# Patient Record
Sex: FEMALE | Race: BLACK OR AFRICAN AMERICAN | Employment: OTHER | URBAN - METROPOLITAN AREA
[De-identification: names, ages, dates, MRNs, and addresses within clinical notes are randomized per-mention and may not be internally consistent; named-entity substitution may affect disease eponyms.]

---

## 2020-09-08 ENCOUNTER — OFFICE VISIT (OUTPATIENT)
Dept: FAMILY MEDICINE CLINIC | Facility: CLINIC | Age: 65
End: 2020-09-08
Payer: MEDICARE

## 2020-09-08 VITALS
HEIGHT: 63.5 IN | RESPIRATION RATE: 17 BRPM | DIASTOLIC BLOOD PRESSURE: 78 MMHG | BODY MASS INDEX: 28.53 KG/M2 | WEIGHT: 163 LBS | HEART RATE: 69 BPM | TEMPERATURE: 97 F | SYSTOLIC BLOOD PRESSURE: 150 MMHG

## 2020-09-08 DIAGNOSIS — Z13.220 LIPID SCREENING: ICD-10-CM

## 2020-09-08 DIAGNOSIS — R92.1 BREAST CALCIFICATION, LEFT: Primary | ICD-10-CM

## 2020-09-08 DIAGNOSIS — G89.29 CHRONIC RIGHT-SIDED LOW BACK PAIN WITH RIGHT-SIDED SCIATICA: ICD-10-CM

## 2020-09-08 DIAGNOSIS — M54.41 CHRONIC RIGHT-SIDED LOW BACK PAIN WITH RIGHT-SIDED SCIATICA: ICD-10-CM

## 2020-09-08 DIAGNOSIS — Z12.11 COLON CANCER SCREENING: ICD-10-CM

## 2020-09-08 DIAGNOSIS — M20.41 HAMMER TOES OF BOTH FEET: ICD-10-CM

## 2020-09-08 DIAGNOSIS — M20.42 HAMMER TOES OF BOTH FEET: ICD-10-CM

## 2020-09-08 DIAGNOSIS — Z13.29 THYROID DISORDER SCREEN: ICD-10-CM

## 2020-09-08 DIAGNOSIS — E55.9 VITAMIN D INSUFFICIENCY: ICD-10-CM

## 2020-09-08 DIAGNOSIS — Z76.89 ESTABLISHING CARE WITH NEW DOCTOR, ENCOUNTER FOR: ICD-10-CM

## 2020-09-08 DIAGNOSIS — I10 ESSENTIAL HYPERTENSION: ICD-10-CM

## 2020-09-08 LAB
ALBUMIN SERPL-MCNC: 3.8 G/DL (ref 3.4–5)
ALBUMIN/GLOB SERPL: 1 {RATIO} (ref 1–2)
ALP LIVER SERPL-CCNC: 93 U/L (ref 55–142)
ALT SERPL-CCNC: 27 U/L (ref 13–56)
ANION GAP SERPL CALC-SCNC: 4 MMOL/L (ref 0–18)
AST SERPL-CCNC: 26 U/L (ref 15–37)
BACTERIA UR QL AUTO: NEGATIVE /HPF
BASOPHILS # BLD AUTO: 0.03 X10(3) UL (ref 0–0.2)
BASOPHILS NFR BLD AUTO: 0.5 %
BILIRUB SERPL-MCNC: 0.2 MG/DL (ref 0.1–2)
BILIRUB UR QL: NEGATIVE
BUN BLD-MCNC: 7 MG/DL (ref 7–18)
BUN/CREAT SERPL: 7.6 (ref 10–20)
CALCIUM BLD-MCNC: 9 MG/DL (ref 8.5–10.1)
CHLORIDE SERPL-SCNC: 105 MMOL/L (ref 98–112)
CHOLEST SMN-MCNC: 204 MG/DL (ref ?–200)
CLARITY UR: CLEAR
CO2 SERPL-SCNC: 31 MMOL/L (ref 21–32)
COLOR UR: YELLOW
CREAT BLD-MCNC: 0.92 MG/DL (ref 0.55–1.02)
DEPRECATED RDW RBC AUTO: 49.9 FL (ref 35.1–46.3)
EOSINOPHIL # BLD AUTO: 0.04 X10(3) UL (ref 0–0.7)
EOSINOPHIL NFR BLD AUTO: 0.7 %
ERYTHROCYTE [DISTWIDTH] IN BLOOD BY AUTOMATED COUNT: 15.1 % (ref 11–15)
GLOBULIN PLAS-MCNC: 3.9 G/DL (ref 2.8–4.4)
GLUCOSE BLD-MCNC: 70 MG/DL (ref 70–99)
GLUCOSE UR-MCNC: NEGATIVE MG/DL
HCT VFR BLD AUTO: 41.3 % (ref 35–48)
HDLC SERPL-MCNC: 75 MG/DL (ref 40–59)
HGB BLD-MCNC: 13.1 G/DL (ref 12–16)
HGB UR QL STRIP.AUTO: NEGATIVE
IMM GRANULOCYTES # BLD AUTO: 0.01 X10(3) UL (ref 0–1)
IMM GRANULOCYTES NFR BLD: 0.2 %
KETONES UR-MCNC: NEGATIVE MG/DL
LDLC SERPL CALC-MCNC: 109 MG/DL (ref ?–100)
LEUKOCYTE ESTERASE UR QL STRIP.AUTO: NEGATIVE
LYMPHOCYTES # BLD AUTO: 1.57 X10(3) UL (ref 1–4)
LYMPHOCYTES NFR BLD AUTO: 25.9 %
M PROTEIN MFR SERPL ELPH: 7.7 G/DL (ref 6.4–8.2)
MCH RBC QN AUTO: 28.6 PG (ref 26–34)
MCHC RBC AUTO-ENTMCNC: 31.7 G/DL (ref 31–37)
MCV RBC AUTO: 90.2 FL (ref 80–100)
MONOCYTES # BLD AUTO: 0.48 X10(3) UL (ref 0.1–1)
MONOCYTES NFR BLD AUTO: 7.9 %
NEUTROPHILS # BLD AUTO: 3.93 X10 (3) UL (ref 1.5–7.7)
NEUTROPHILS # BLD AUTO: 3.93 X10(3) UL (ref 1.5–7.7)
NEUTROPHILS NFR BLD AUTO: 64.8 %
NITRITE UR QL STRIP.AUTO: NEGATIVE
NONHDLC SERPL-MCNC: 129 MG/DL (ref ?–130)
OSMOLALITY SERPL CALC.SUM OF ELEC: 286 MOSM/KG (ref 275–295)
PATIENT FASTING Y/N/NP: NO
PATIENT FASTING Y/N/NP: NO
PH UR: 7 [PH] (ref 5–8)
PLATELET # BLD AUTO: 156 10(3)UL (ref 150–450)
POTASSIUM SERPL-SCNC: 3.6 MMOL/L (ref 3.5–5.1)
PROT UR-MCNC: NEGATIVE MG/DL
RBC # BLD AUTO: 4.58 X10(6)UL (ref 3.8–5.3)
RBC #/AREA URNS AUTO: <1 /HPF
SODIUM SERPL-SCNC: 140 MMOL/L (ref 136–145)
SP GR UR STRIP: 1.01 (ref 1–1.03)
TRIGL SERPL-MCNC: 100 MG/DL (ref 30–149)
TSI SER-ACNC: 3.2 MIU/ML (ref 0.36–3.74)
UROBILINOGEN UR STRIP-ACNC: <2
VLDLC SERPL CALC-MCNC: 20 MG/DL (ref 0–30)
WBC # BLD AUTO: 6.1 X10(3) UL (ref 4–11)
WBC #/AREA URNS AUTO: <1 /HPF

## 2020-09-08 PROCEDURE — 99204 OFFICE O/P NEW MOD 45 MIN: CPT | Performed by: FAMILY MEDICINE

## 2020-09-08 NOTE — PATIENT INSTRUCTIONS
Patient referred to GI for updating her colonoscopy. Patient also referred to mammography for diagnostic evaluation with a history of left breast calcifications. Patient referred to podiatry for hammertoes bilaterally.   Monitor blood pressures and record

## 2020-09-08 NOTE — PROGRESS NOTES
HPI:    Patient ID: López Luna is a 77year old female.     Patient is a 38-year-old -American female here for establishing care physical and for status update on any confirmed chronic medical illnesses and follow up on any previous labs or proced OR) Take 1 tablet by mouth daily. • Coenzyme Q10 (CO Q-10 OR) Take 1 tablet by mouth daily. • Lactobacillus (PROBIATA OR) Take by mouth.        Allergies:  Lisinopril              ANAPHYLAXIS  Penicillins             ANAPHYLAXIS     09/08/20  2234 insufficiency  Status update.  - VITAMIN D, 25-HYDROXY; Future  - VITAMIN D, 25-HYDROXY    8. Lipid screening  Screened. - LIPID PANEL; Future  - LIPID PANEL    9. Thyroid disorder screen  Screened.   - TSH W REFLEX TO FREE T4; Future  - TSH W REFLEX TO FR

## 2020-09-09 LAB — 25(OH)D3 SERPL-MCNC: 34.6 NG/ML (ref 30–100)

## 2020-09-10 ENCOUNTER — OFFICE VISIT (OUTPATIENT)
Dept: GASTROENTEROLOGY | Facility: CLINIC | Age: 65
End: 2020-09-10
Payer: MEDICARE

## 2020-09-10 ENCOUNTER — TELEPHONE (OUTPATIENT)
Dept: GASTROENTEROLOGY | Facility: CLINIC | Age: 65
End: 2020-09-10

## 2020-09-10 VITALS
HEIGHT: 63.5 IN | HEART RATE: 67 BPM | WEIGHT: 163 LBS | BODY MASS INDEX: 28.53 KG/M2 | SYSTOLIC BLOOD PRESSURE: 125 MMHG | DIASTOLIC BLOOD PRESSURE: 75 MMHG

## 2020-09-10 DIAGNOSIS — Z12.11 SCREEN FOR COLON CANCER: Primary | ICD-10-CM

## 2020-09-10 DIAGNOSIS — Z01.818 ENCOUNTER FOR PREOPERATIVE GASTROINTESTINAL EXAMINATION: Primary | ICD-10-CM

## 2020-09-10 DIAGNOSIS — T88.52XD: ICD-10-CM

## 2020-09-10 PROCEDURE — 99203 OFFICE O/P NEW LOW 30 MIN: CPT | Performed by: INTERNAL MEDICINE

## 2020-09-10 RX ORDER — POLYETHYLENE GLYCOL 3350, SODIUM CHLORIDE, SODIUM BICARBONATE, POTASSIUM CHLORIDE 420; 11.2; 5.72; 1.48 G/4L; G/4L; G/4L; G/4L
POWDER, FOR SOLUTION ORAL
Qty: 1 BOTTLE | Refills: 0 | Status: ON HOLD | OUTPATIENT
Start: 2020-09-10 | End: 2020-11-11

## 2020-09-10 NOTE — H&P
[address] [facility] [address] Gastroenterology                                                                                                  Clinic History and Physical     Pa guaiFENesin (HERBAL EXPEC OR) Take by mouth. • Misc Natural Products (TUMERSAID OR) Take 1 tablet by mouth daily. • Coenzyme Q10 (CO Q-10 OR) Take 1 tablet by mouth daily. • Lactobacillus (PROBIATA OR) Take by mouth.          Allergies:    Lisin will use MAC    # Average Risk screening: patient is considered average risk for colon cancer (NO family hx of colon cancer) and it is appropriate to proceed with screening colonoscopy.  Patient is currently asymptomatic and denies diarrhea, hematochezia, t

## 2020-09-10 NOTE — PATIENT INSTRUCTIONS
1. Schedule colonoscopy with MAC [Diagnosis: screening]    2.  bowel prep from pharmacy ([employer])    3. Continue all medications for procedure    4. Read all bowel prep instructions carefully    5.  AVOID seeds, nuts, popcorn, raw fruits and ve

## 2020-09-10 NOTE — TELEPHONE ENCOUNTER
Scheduled for:  Colonoscopy 90846  Provider Name:  Evan Recio  Date:  11/11/20  Location:  [facility]  Sedation:  MAC  Time:  9am pt told to arrive at 8am  Prep:  Split dose trilyte  Meds/Allergies Reconciled?:  Physician reviewed    Diagnosis with codes:  Screen Z

## 2020-09-11 ENCOUNTER — HOSPITAL ENCOUNTER (OUTPATIENT)
Dept: GENERAL RADIOLOGY | Facility: HOSPITAL | Age: 65
Discharge: HOME | End: 2020-09-11
Attending: FAMILY MEDICINE
Payer: MEDICARE

## 2020-09-11 DIAGNOSIS — G89.29 CHRONIC RIGHT-SIDED LOW BACK PAIN WITH RIGHT-SIDED SCIATICA: ICD-10-CM

## 2020-09-11 DIAGNOSIS — M54.41 CHRONIC RIGHT-SIDED LOW BACK PAIN WITH RIGHT-SIDED SCIATICA: ICD-10-CM

## 2020-09-11 PROCEDURE — 72110 X-RAY EXAM L-2 SPINE 4/>VWS: CPT | Performed by: FAMILY MEDICINE

## 2020-09-16 ENCOUNTER — TELEPHONE (OUTPATIENT)
Dept: FAMILY MEDICINE CLINIC | Facility: CLINIC | Age: 65
End: 2020-09-16

## 2020-09-16 DIAGNOSIS — M47.896 OTHER OSTEOARTHRITIS OF SPINE, LUMBAR REGION: ICD-10-CM

## 2020-09-16 DIAGNOSIS — M51.36 DEGENERATIVE DISC DISEASE, LUMBAR: Primary | ICD-10-CM

## 2020-09-16 DIAGNOSIS — N20.0 LEFT RENAL STONE: Primary | ICD-10-CM

## 2020-09-16 NOTE — TELEPHONE ENCOUNTER
Dr. Isidoro Gottron, patient is requesting a referral to Physiatry. Referral has been pended, please advise.

## 2020-09-16 NOTE — TELEPHONE ENCOUNTER
It states if she has symptoms on the left. Her symptoms are on the right side. If she wants to proceed with confirmation no matter what side she has pain on, then I have placed the order for CT of the abdomen on the chart.

## 2020-09-16 NOTE — TELEPHONE ENCOUNTER
Patient states that she saw the recommendation for CT scan from her xray of lumbar spine due to kidney stones. Patient is requesting an order to move forward with the CT scan. Please advise.       =====  CONCLUSION:   1. Mild anterolisthesis of L4 in relation L5. No fracture. Degenerative narrowing the posterior facet joints at L4-L5 and L5-S1. Minimal anterior wedging of T11 and T12 more likely chronic. 2. Ovoid 5.6 x 2.9 mm calcification just to the left of the L4-L5 intervertebral disc space which may be within the mid left ureter. Are there are any symptoms of left renal colic? If so, then follow-up CT scanning is advised. Correlate clinically.

## 2020-09-24 ENCOUNTER — HOSPITAL ENCOUNTER (OUTPATIENT)
Dept: MAMMOGRAPHY | Facility: HOSPITAL | Age: 65
Discharge: HOME | End: 2020-09-24
Attending: FAMILY MEDICINE
Payer: MEDICARE

## 2020-09-24 ENCOUNTER — HOSPITAL ENCOUNTER (OUTPATIENT)
Dept: CT IMAGING | Facility: HOSPITAL | Age: 65
Discharge: HOME | End: 2020-09-24
Attending: FAMILY MEDICINE
Payer: MEDICARE

## 2020-09-24 DIAGNOSIS — R92.1 BREAST CALCIFICATION, LEFT: ICD-10-CM

## 2020-09-24 DIAGNOSIS — N20.0 LEFT RENAL STONE: ICD-10-CM

## 2020-09-24 PROCEDURE — 77062 BREAST TOMOSYNTHESIS BI: CPT | Performed by: FAMILY MEDICINE

## 2020-09-24 PROCEDURE — 77066 DX MAMMO INCL CAD BI: CPT | Performed by: FAMILY MEDICINE

## 2020-09-24 PROCEDURE — 74177 CT ABD & PELVIS W/CONTRAST: CPT | Performed by: FAMILY MEDICINE

## 2020-09-29 ENCOUNTER — OFFICE VISIT (OUTPATIENT)
Dept: NEUROLOGY | Facility: CLINIC | Age: 65
End: 2020-09-29
Payer: MEDICARE

## 2020-09-29 VITALS — HEIGHT: 63.5 IN | BODY MASS INDEX: 28.87 KG/M2 | WEIGHT: 165 LBS

## 2020-09-29 DIAGNOSIS — M47.816 LUMBAR FACET ARTHROPATHY: Primary | ICD-10-CM

## 2020-09-29 PROCEDURE — 99204 OFFICE O/P NEW MOD 45 MIN: CPT | Performed by: PHYSICAL MEDICINE & REHABILITATION

## 2020-09-29 NOTE — H&P
[facility]    History and Physical    Moisés Fuelling Patient Status:  No patient class for patient encounter    6/10/1955 MRN [id number]   Location [facility] Attending No att. providers found   Hosp Day # 0 PC ROS  Constitutional  Sleep Disturbance: admits  Chills: denies  Fever: denies  Weight Gain: denies  Weight Loss: denies   Cardiovascular  Chest Pain: denies  Irregular Heartbeat: denies   Respiratory  Painful Breathing: denies  Wheezing: denies   [place] noted.      Results:     Lab Results   Component Value Date    WBC 6.1 09/08/2020    HGB 13.1 09/08/2020    HCT 41.3 09/08/2020    .0 09/08/2020    CREATSERUM 0.92 09/08/2020    BUN 7 09/08/2020     09/08/2020    K 3.6 09/08/2020     09/0

## 2020-09-29 NOTE — PATIENT INSTRUCTIONS
If Doctor has ordered an injection or MRI and if you do not hear from us within 3 business days please call the office or send a message through [phone] and ask if the injection/MRI has been approved

## 2020-09-30 ENCOUNTER — OFFICE VISIT (OUTPATIENT)
Dept: PODIATRY CLINIC | Facility: CLINIC | Age: 65
End: 2020-09-30
Payer: MEDICARE

## 2020-09-30 DIAGNOSIS — M20.42 HAMMER TOES OF BOTH FEET: ICD-10-CM

## 2020-09-30 DIAGNOSIS — L60.0 INGROWN TOENAIL OF BOTH FEET: Primary | ICD-10-CM

## 2020-09-30 DIAGNOSIS — M20.41 HAMMER TOES OF BOTH FEET: ICD-10-CM

## 2020-09-30 PROCEDURE — G0463 HOSPITAL OUTPT CLINIC VISIT: HCPCS | Performed by: PODIATRIST

## 2020-09-30 PROCEDURE — 99203 OFFICE O/P NEW LOW 30 MIN: CPT | Performed by: PODIATRIST

## 2020-09-30 NOTE — PROGRESS NOTES
HPI:    Patient ID: Sophy Jarvis is a 77year old female. Pleasant 47-year-old female presents to me today as a new patient on referral from [address] [place] [address]. Patient is accompanied by her .   Her primary complaint is ingrown toenails of both great toe with hypertrophy of the nail fold and some keratosis. It is a classic ingrown toenail. I described the etiology, progression, and the way to fix this nail. I diagrammed the procedure and I reviewed the postoperative course potential concerns.   Plan here

## 2020-11-04 ENCOUNTER — TELEPHONE (OUTPATIENT)
Dept: GASTROENTEROLOGY | Facility: CLINIC | Age: 65
End: 2020-11-04

## 2020-11-04 NOTE — TELEPHONE ENCOUNTER
Patient called in wondering what medications she needs to stop prior to the upcoming procedure.  Please follow up thank you

## 2020-11-04 NOTE — TELEPHONE ENCOUNTER
Patient was provided with written procedure instructions at her appt on 9/10/20.  Reviewed the surgery scheduling instructions from that date:    Medication Orders:  Pt is aware to NOT take iron pills, herbal meds and diet supplements for 7 days before exam

## 2020-11-05 ENCOUNTER — PATIENT MESSAGE (OUTPATIENT)
Dept: GASTROENTEROLOGY | Facility: CLINIC | Age: 65
End: 2020-11-05

## 2020-11-05 NOTE — TELEPHONE ENCOUNTER
From: Zoran Reid  To: Tiago Noble MD  Sent: 11/5/2020 8:36 AM CST  Subject: Other    I am scheduled to have a colonoscopy Wednesday November 11th, the paperwork shows no herbal supplements for 7 days prior to colonoscopy.  Would someone please explai

## 2020-11-05 NOTE — TELEPHONE ENCOUNTER
Pt contacted and reviewed she may NOT take ant herbal supplements for 7 days prior to the procedure, confirmed she needs a  to and from, confirmed arrival time.  She is aware the HOSPITAL will contact her to instruct her on the mandatory COVID-19 test

## 2020-11-08 ENCOUNTER — APPOINTMENT (OUTPATIENT)
Dept: LAB | Facility: HOSPITAL | Age: 65
End: 2020-11-08
Attending: INTERNAL MEDICINE
Payer: MEDICARE

## 2020-11-08 DIAGNOSIS — Z01.818 PRE-OP TESTING: ICD-10-CM

## 2020-11-11 ENCOUNTER — ANESTHESIA EVENT (OUTPATIENT)
Dept: ENDOSCOPY | Facility: HOSPITAL | Age: 65
End: 2020-11-11
Payer: MEDICARE

## 2020-11-11 ENCOUNTER — ANESTHESIA (OUTPATIENT)
Dept: ENDOSCOPY | Facility: HOSPITAL | Age: 65
End: 2020-11-11
Payer: MEDICARE

## 2020-11-11 ENCOUNTER — HOSPITAL ENCOUNTER (OUTPATIENT)
Facility: HOSPITAL | Age: 65
Setting detail: HOSPITAL OUTPATIENT SURGERY
Discharge: HOME | End: 2020-11-11
Attending: INTERNAL MEDICINE | Admitting: INTERNAL MEDICINE
Payer: MEDICARE

## 2020-11-11 VITALS
WEIGHT: 165 LBS | TEMPERATURE: 97 F | DIASTOLIC BLOOD PRESSURE: 71 MMHG | HEIGHT: 63 IN | HEART RATE: 69 BPM | SYSTOLIC BLOOD PRESSURE: 120 MMHG | OXYGEN SATURATION: 97 % | RESPIRATION RATE: 15 BRPM | BODY MASS INDEX: 29.23 KG/M2

## 2020-11-11 DIAGNOSIS — Z01.818 PRE-OP TESTING: Primary | ICD-10-CM

## 2020-11-11 DIAGNOSIS — Z12.11 SCREEN FOR COLON CANCER: ICD-10-CM

## 2020-11-11 PROCEDURE — 45385 COLONOSCOPY W/LESION REMOVAL: CPT | Performed by: INTERNAL MEDICINE

## 2020-11-11 PROCEDURE — 0DBK8ZX EXCISION OF ASCENDING COLON, VIA NATURAL OR ARTIFICIAL OPENING ENDOSCOPIC, DIAGNOSTIC: ICD-10-PCS | Performed by: INTERNAL MEDICINE

## 2020-11-11 RX ORDER — SODIUM CHLORIDE, SODIUM LACTATE, POTASSIUM CHLORIDE, CALCIUM CHLORIDE 600; 310; 30; 20 MG/100ML; MG/100ML; MG/100ML; MG/100ML
INJECTION, SOLUTION INTRAVENOUS CONTINUOUS
Status: DISCONTINUED | OUTPATIENT
Start: 2020-11-11 | End: 2020-11-11

## 2020-11-11 RX ORDER — LIDOCAINE HYDROCHLORIDE 10 MG/ML
INJECTION, SOLUTION EPIDURAL; INFILTRATION; INTRACAUDAL; PERINEURAL AS NEEDED
Status: DISCONTINUED | OUTPATIENT
Start: 2020-11-11 | End: 2020-11-11 | Stop reason: SURG

## 2020-11-11 RX ORDER — NALOXONE HYDROCHLORIDE 0.4 MG/ML
80 INJECTION, SOLUTION INTRAMUSCULAR; INTRAVENOUS; SUBCUTANEOUS AS NEEDED
Status: DISCONTINUED | OUTPATIENT
Start: 2020-11-11 | End: 2020-11-11

## 2020-11-11 RX ADMIN — LIDOCAINE HYDROCHLORIDE 50 MG: 10 INJECTION, SOLUTION EPIDURAL; INFILTRATION; INTRACAUDAL; PERINEURAL at 09:00:00

## 2020-11-11 RX ADMIN — SODIUM CHLORIDE, SODIUM LACTATE, POTASSIUM CHLORIDE, CALCIUM CHLORIDE: 600; 310; 30; 20 INJECTION, SOLUTION INTRAVENOUS at 08:57:00

## 2020-11-11 RX ADMIN — SODIUM CHLORIDE, SODIUM LACTATE, POTASSIUM CHLORIDE, CALCIUM CHLORIDE: 600; 310; 30; 20 INJECTION, SOLUTION INTRAVENOUS at 09:16:00

## 2020-11-11 NOTE — OPERATIVE REPORT
COLONOSCOPY REPORT    Zoran Reid     6/10/1955 Age 77year old   PCP Yolis Law DO Endoscopist Brennen Mott MD     Date of procedure: 20    Procedure: Colonoscopy w/cold snare polypectomy    Pre-operative diagnosis: Screening rectum revealed small internal hemorrhoids. 5. The colonic mucosa throughout the colon showed normal vascular pattern, without evidence of angioectasias or inflammation. 6. ALIREZA: normal rectal tone, no masses palpated.      Impression:   · One small c

## 2020-11-11 NOTE — H&P
History & Physical Examination    Patient Name: Javier Caballero  MRN: [id number]  [facility]: [id number]  YOB: 1955    Diagnosis: screening for colon cancer      •  AMLODIPINE BESYLATE OR, Take 5 mg by mouth daily. , Disp: , Rfl: , 11/10/2020    •  Asc alternatives of the procedure with the patient/family. They understand and agree to proceed with plan of care. I have reviewed the History and Physical done within the last 30 days. Any changes noted above.     MD Kamini Galan [id number]

## 2020-11-11 NOTE — ANESTHESIA PREPROCEDURE EVALUATION
Anesthesia PreOp Note    HPI:     Anabel Harris is a 77year old female who presents for preoperative consultation requested by: Joanna Galarza MD    Date of Surgery: 11/11/2020    Procedure(s):  COLONOSCOPY  Indication: Screen for colon cancer    Releva Social Needs      Financial resource strain: Not on file      Food insecurity        Worry: Not on file        Inability: Not on file      Transportation needs        Medical: Not on file        Non-medical: Not on file    Tobacco Use      Smoking status: 09/08/2020    .0 09/08/2020     Lab Results   Component Value Date     09/08/2020    K 3.6 09/08/2020     09/08/2020    CO2 31.0 09/08/2020    BUN 7 09/08/2020    CREATSERUM 0.92 09/08/2020    GLU 70 09/08/2020    CA 9.0 09/08/2020

## 2020-11-11 NOTE — ANESTHESIA POSTPROCEDURE EVALUATION
Patient: Pérez Clinton    Procedure Summary     Date: 11/11/20 Room / Location: [address] [place] [address] ENDOSCOPY 01 / [address] [place] [address] ENDOSCOPY    Anesthesia Start: 6932 Anesthesia Stop: 3036    Procedure: COLONOSCOPY (N/A ) Diagnosis:       Screen for colon cancer      (Polyp, hemorrho

## 2020-11-17 ENCOUNTER — OFFICE VISIT (OUTPATIENT)
Dept: FAMILY MEDICINE CLINIC | Facility: CLINIC | Age: 65
End: 2020-11-17
Payer: MEDICARE

## 2020-11-17 VITALS
TEMPERATURE: 98 F | HEART RATE: 61 BPM | DIASTOLIC BLOOD PRESSURE: 78 MMHG | SYSTOLIC BLOOD PRESSURE: 140 MMHG | WEIGHT: 163 LBS | RESPIRATION RATE: 17 BRPM | BODY MASS INDEX: 28.88 KG/M2 | HEIGHT: 63 IN

## 2020-11-17 DIAGNOSIS — M20.41 HAMMER TOES OF BOTH FEET: ICD-10-CM

## 2020-11-17 DIAGNOSIS — M20.42 HAMMER TOES OF BOTH FEET: ICD-10-CM

## 2020-11-17 DIAGNOSIS — Z00.00 MEDICARE ANNUAL WELLNESS VISIT, INITIAL: Primary | ICD-10-CM

## 2020-11-17 DIAGNOSIS — I10 ESSENTIAL HYPERTENSION: ICD-10-CM

## 2020-11-17 PROCEDURE — G0438 PPPS, INITIAL VISIT: HCPCS | Performed by: FAMILY MEDICINE

## 2020-11-17 NOTE — PATIENT INSTRUCTIONS
All adult screening ordered and done appropriate for patient's age and gender and risk factors and complaints. Medication reviewed and renewed where needed and appropriate. Comply with medications.   Encouraged physical fitness and daily physical activity

## 2020-11-17 NOTE — PROGRESS NOTES
HPI:    Patient ID: Javier Caballero is a 77year old female.     This patient is a  77year old AA female here for medicare annual visit and for status update on any confirmed chronic medical illnesses and follow up on any previous labs or procedures that wer Diabetes Screening      HbgA1C   Annually No results found for: A1C No flowsheet data found. Fasting Blood Sugar (FSB)Annually Glucose (mg/dL)   Date Value   09/08/2020 70    No flowsheet data found.    Cardiovascular Disease Screening     LDL Annually Procedure External Lab or Procedure   Annual Monitoring of Persistent     Medications (ACE/ARB, digoxin, diuretics)    Potassium  Annually Potassium (mmol/L)   Date Value   09/08/2020 3.6    No flowsheet data found.     Creatinine  Annually Creatinine (mg/d Functional Status     Hearing Problems?: (P) No    Vision Problems? : (P) No    Difficulty walking?: (P) No    Difficulty dressing or bathing?: (P) No    Problems with daily activities? : (P) No    Memory Problems?: (P) No      Fall/Risk Assessment and make inappropriate responses: No I avoid social activities because I cannot hear well and fear I will reply improperly: No   Family members and friends have told me they think I may have hearing loss: No           Visual Acuity     Right Eye Visual Acu daily. Recommend weight loss via daily exercising and consistent healthy dietary changes. Return in about 4 months (around 3/17/2021), or if symptoms worsen or fail to improve.          AZ#[id number]

## 2020-11-18 ENCOUNTER — TELEPHONE (OUTPATIENT)
Dept: FAMILY MEDICINE CLINIC | Facility: CLINIC | Age: 65
End: 2020-11-18

## 2020-11-18 ENCOUNTER — TELEPHONE (OUTPATIENT)
Dept: GASTROENTEROLOGY | Facility: CLINIC | Age: 65
End: 2020-11-18

## 2020-11-18 DIAGNOSIS — R39.15 URINARY URGENCY: Primary | ICD-10-CM

## 2020-11-18 DIAGNOSIS — R35.0 URINARY FREQUENCY: ICD-10-CM

## 2020-11-18 NOTE — TELEPHONE ENCOUNTER
Action Requested: Summary for Provider     []  Critical Lab, Recommendations Needed  [] Need Additional Advice  []   FYI    []   Need Orders  [] Need Medications Sent to Pharmacy  []  Other     SUMMARY: pt requesting testing for urinary symptoms    Pt repo

## 2020-11-18 NOTE — TELEPHONE ENCOUNTER
Spoke with patient ( verified) and relayed message below as well as scheduling # for lab appt--patient verbalizes understanding and agreement. No further questions/concerns at this time.

## 2020-11-18 NOTE — TELEPHONE ENCOUNTER
I mailed out colonoscopy results letter to pt  Updated health maintenance  Entered into 5 yr CLN recall  Recall colon in 5 years per.  Colon done 11/11/2020    GI staff: please place recall for colonoscopy in 5 years

## 2020-11-19 ENCOUNTER — APPOINTMENT (OUTPATIENT)
Dept: LAB | Facility: HOSPITAL | Age: 65
End: 2020-11-19
Attending: FAMILY MEDICINE
Payer: MEDICARE

## 2020-11-19 PROCEDURE — 81003 URINALYSIS AUTO W/O SCOPE: CPT | Performed by: FAMILY MEDICINE

## 2020-11-19 PROCEDURE — 87086 URINE CULTURE/COLONY COUNT: CPT | Performed by: FAMILY MEDICINE

## 2021-02-18 ENCOUNTER — OFFICE VISIT (OUTPATIENT)
Dept: FAMILY MEDICINE CLINIC | Facility: CLINIC | Age: 66
End: 2021-02-18
Payer: MEDICARE

## 2021-02-18 VITALS
DIASTOLIC BLOOD PRESSURE: 80 MMHG | SYSTOLIC BLOOD PRESSURE: 150 MMHG | TEMPERATURE: 97 F | HEIGHT: 63 IN | RESPIRATION RATE: 17 BRPM | HEART RATE: 64 BPM | BODY MASS INDEX: 29 KG/M2

## 2021-02-18 DIAGNOSIS — N20.0 RENAL LITHIASIS: Primary | ICD-10-CM

## 2021-02-18 DIAGNOSIS — M54.2 NECK PAIN: ICD-10-CM

## 2021-02-18 DIAGNOSIS — M43.10 ANTEROLISTHESIS: ICD-10-CM

## 2021-02-18 DIAGNOSIS — I83.93 ASYMPTOMATIC VARICOSE VEINS OF BOTH LOWER EXTREMITIES: ICD-10-CM

## 2021-02-18 DIAGNOSIS — M99.01 CERVICOTHORACIC SOMATIC DYSFUNCTION: ICD-10-CM

## 2021-02-18 PROCEDURE — 99214 OFFICE O/P EST MOD 30 MIN: CPT | Performed by: FAMILY MEDICINE

## 2021-02-18 PROCEDURE — 98925 OSTEOPATH MANJ 1-2 REGIONS: CPT | Performed by: FAMILY MEDICINE

## 2021-02-18 PROCEDURE — 3079F DIAST BP 80-89 MM HG: CPT | Performed by: FAMILY MEDICINE

## 2021-02-18 PROCEDURE — 3077F SYST BP >= 140 MM HG: CPT | Performed by: FAMILY MEDICINE

## 2021-02-18 NOTE — PATIENT INSTRUCTIONS
CT of abdomen/pelvis ordered. OMT done. Recommend weight loss via daily exercising and consistent healthy dietary changes. Encouraged physical fitness and daily physical activity daily (PLAY). Increase clear fluid hydration.   Observation regarding vari

## 2021-02-21 NOTE — PROGRESS NOTES
HPI:    Patient ID: Barbara Phillips is a 77year old female. This patient is a 64-year-old -American female established in our clinic with several concerns.   She is here to follow-up regarding her low back issues in which she has confirmed anteroli OR) Take 1 tablet by mouth daily. • Lactobacillus (PROBIATA OR) Take by mouth.        Allergies:  Lisinopril              ANAPHYLAXIS  Penicillins             ANAPHYLAXIS     02/18/21  1231   BP: 150/80   Pulse:    Resp:    Temp:        PHYSICAL EXAM: changes. Encouraged physical fitness and daily physical activity daily (PLAY). Increase clear fluid hydration. Observation regarding varicose veins (Superficial Thrombophlebitis). Patient has been encouraged to continue utilizing back brace.   Patient b

## 2021-03-04 ENCOUNTER — TELEPHONE (OUTPATIENT)
Dept: FAMILY MEDICINE CLINIC | Facility: CLINIC | Age: 66
End: 2021-03-04

## 2021-03-04 DIAGNOSIS — M99.01 CERVICOTHORACIC SOMATIC DYSFUNCTION: ICD-10-CM

## 2021-03-04 DIAGNOSIS — M54.2 NECK PAIN: Primary | ICD-10-CM

## 2021-03-09 DIAGNOSIS — Z23 NEED FOR VACCINATION: ICD-10-CM

## 2021-04-12 ENCOUNTER — TELEPHONE (OUTPATIENT)
Dept: FAMILY MEDICINE CLINIC | Facility: CLINIC | Age: 66
End: 2021-04-12

## 2021-04-20 ENCOUNTER — HOSPITAL ENCOUNTER (OUTPATIENT)
Dept: CT IMAGING | Facility: HOSPITAL | Age: 66
Discharge: HOME | End: 2021-04-20
Attending: FAMILY MEDICINE
Payer: MEDICARE

## 2021-04-20 DIAGNOSIS — N20.0 RENAL LITHIASIS: ICD-10-CM

## 2021-04-20 PROCEDURE — 82565 ASSAY OF CREATININE: CPT

## 2021-04-20 PROCEDURE — 74178 CT ABD&PLV WO CNTR FLWD CNTR: CPT | Performed by: FAMILY MEDICINE

## 2021-04-29 ENCOUNTER — HOSPITAL ENCOUNTER (OUTPATIENT)
Dept: MRI IMAGING | Facility: HOSPITAL | Age: 66
Discharge: HOME | End: 2021-04-29
Attending: FAMILY MEDICINE
Payer: MEDICARE

## 2021-04-29 DIAGNOSIS — D49.0 LIVER NEOPLASM: Primary | ICD-10-CM

## 2021-04-29 DIAGNOSIS — D49.0 LIVER NEOPLASM: ICD-10-CM

## 2021-04-29 PROCEDURE — 72197 MRI PELVIS W/O & W/DYE: CPT | Performed by: FAMILY MEDICINE

## 2021-04-29 PROCEDURE — 74183 MRI ABD W/O CNTR FLWD CNTR: CPT | Performed by: FAMILY MEDICINE

## 2021-04-29 PROCEDURE — A9581 GADOXETATE DISODIUM INJ: HCPCS | Performed by: FAMILY MEDICINE

## 2021-06-03 ENCOUNTER — OFFICE VISIT (OUTPATIENT)
Dept: FAMILY MEDICINE CLINIC | Facility: CLINIC | Age: 66
End: 2021-06-03
Payer: MEDICARE

## 2021-06-03 ENCOUNTER — LAB ENCOUNTER (OUTPATIENT)
Dept: LAB | Age: 66
End: 2021-06-03
Attending: FAMILY MEDICINE
Payer: MEDICARE

## 2021-06-03 VITALS
DIASTOLIC BLOOD PRESSURE: 78 MMHG | SYSTOLIC BLOOD PRESSURE: 138 MMHG | HEIGHT: 63 IN | BODY MASS INDEX: 29 KG/M2 | RESPIRATION RATE: 17 BRPM | TEMPERATURE: 97 F | HEART RATE: 69 BPM

## 2021-06-03 DIAGNOSIS — Z13.21 ENCOUNTER FOR VITAMIN DEFICIENCY SCREENING: Primary | ICD-10-CM

## 2021-06-03 DIAGNOSIS — E55.9 VITAMIN D INSUFFICIENCY: ICD-10-CM

## 2021-06-03 DIAGNOSIS — D64.9 ANEMIA, UNSPECIFIED TYPE: ICD-10-CM

## 2021-06-03 DIAGNOSIS — D49.0 LIVER NEOPLASM: ICD-10-CM

## 2021-06-03 PROCEDURE — 84446 ASSAY OF VITAMIN E: CPT

## 2021-06-03 PROCEDURE — 83540 ASSAY OF IRON: CPT | Performed by: FAMILY MEDICINE

## 2021-06-03 PROCEDURE — 82306 VITAMIN D 25 HYDROXY: CPT | Performed by: FAMILY MEDICINE

## 2021-06-03 PROCEDURE — 3078F DIAST BP <80 MM HG: CPT | Performed by: FAMILY MEDICINE

## 2021-06-03 PROCEDURE — 36415 COLL VENOUS BLD VENIPUNCTURE: CPT | Performed by: FAMILY MEDICINE

## 2021-06-03 PROCEDURE — 80053 COMPREHEN METABOLIC PANEL: CPT | Performed by: FAMILY MEDICINE

## 2021-06-03 PROCEDURE — 85025 COMPLETE CBC W/AUTO DIFF WBC: CPT | Performed by: FAMILY MEDICINE

## 2021-06-03 PROCEDURE — 84466 ASSAY OF TRANSFERRIN: CPT | Performed by: FAMILY MEDICINE

## 2021-06-03 PROCEDURE — 99214 OFFICE O/P EST MOD 30 MIN: CPT | Performed by: FAMILY MEDICINE

## 2021-06-03 PROCEDURE — 3075F SYST BP GE 130 - 139MM HG: CPT | Performed by: FAMILY MEDICINE

## 2021-06-03 NOTE — PROGRESS NOTES
HPI/Subjective:   Patient ID: Adrienne Erickson is a 77year old female.     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 Encounter for vitamin deficiency screening  Ordered. - VITAMIN E, SERUM    3. Anemia, unspecified type  Status update.  - CBC WITH DIFFERENTIAL WITH PLATELET  - IRON AND TIBC    4. Vitamin D insufficiency  Screened.   - VITAMIN D, 25-HYDROXY

## 2021-06-03 NOTE — PATIENT INSTRUCTIONS
Keep liver specialist appointment. Order for TIBC and iron, vitamin D, vitamin D, CMP and CBC placed on the chart to be drawn on today. Medication reviewed and renewed where needed and appropriate. Comply with medications.   Monitor blood pressures and r

## 2021-06-17 ENCOUNTER — LAB ENCOUNTER (OUTPATIENT)
Dept: LAB | Facility: HOSPITAL | Age: 66
End: 2021-06-17
Attending: FAMILY MEDICINE
Payer: MEDICARE

## 2021-06-17 ENCOUNTER — NURSE TRIAGE (OUTPATIENT)
Dept: FAMILY MEDICINE CLINIC | Facility: CLINIC | Age: 66
End: 2021-06-17

## 2021-06-17 DIAGNOSIS — R35.0 URINARY FREQUENCY: Primary | ICD-10-CM

## 2021-06-17 DIAGNOSIS — R39.9 UTI SYMPTOMS: ICD-10-CM

## 2021-06-17 PROCEDURE — 87086 URINE CULTURE/COLONY COUNT: CPT

## 2021-06-17 PROCEDURE — 81003 URINALYSIS AUTO W/O SCOPE: CPT | Performed by: FAMILY MEDICINE

## 2021-06-17 NOTE — TELEPHONE ENCOUNTER
----- Message from Lazaro Londono sent at 6/17/2021  4:00 PM CDT -----  Regarding: Non-Urgent Medical Question  Contact: [phone]  I am having frequent urination and achy feeling at bottom of stomach, maybe bladder infection, can I come in for urine te

## 2021-06-17 NOTE — TELEPHONE ENCOUNTER
Action Requested: Summary for Provider     []  Critical Lab, Recommendations Needed  [x] Need Additional Advice  []   FYI    [x]   Need Orders  [] Need Medications Sent to Pharmacy  []  Other     SUMMARY: c/o symptoms began on Monday and notice she was [place]

## 2021-06-17 NOTE — TELEPHONE ENCOUNTER
Spoke with patient, (  verified ) informed of 's   instructions below    Provided location /  hours to [place] [address] lab , patient will go tonight

## 2021-06-28 ENCOUNTER — OFFICE VISIT (OUTPATIENT)
Dept: SURGERY | Facility: CLINIC | Age: 66
End: 2021-06-28
Payer: MEDICARE

## 2021-06-28 VITALS
BODY MASS INDEX: 29.05 KG/M2 | RESPIRATION RATE: 16 BRPM | HEIGHT: 63.5 IN | OXYGEN SATURATION: 99 % | SYSTOLIC BLOOD PRESSURE: 152 MMHG | HEART RATE: 68 BPM | DIASTOLIC BLOOD PRESSURE: 78 MMHG | WEIGHT: 166 LBS

## 2021-06-28 DIAGNOSIS — K76.9 LIVER LESION: Primary | ICD-10-CM

## 2021-06-28 NOTE — PROGRESS NOTES
[facility]  [address], [address]  [address]  [phone] ([phone]) old with incidental liver lesion    - No underlying liver disease, normal liver test  - Discussed with IR and they do not clearly see lesion on prior Sept CT scan so it appears this may be new lesion  - Since this appears to be new lesion will plan on ultr

## 2021-07-05 ENCOUNTER — NURSE ONLY (OUTPATIENT)
Dept: LAB | Facility: HOSPITAL | Age: 66
End: 2021-07-05
Attending: RADIOLOGY
Payer: MEDICARE

## 2021-07-05 ENCOUNTER — LAB ENCOUNTER (OUTPATIENT)
Dept: LAB | Facility: HOSPITAL | Age: 66
End: 2021-07-05
Attending: INTERNAL MEDICINE
Payer: MEDICARE

## 2021-07-05 DIAGNOSIS — Z01.818 PREOP TESTING: ICD-10-CM

## 2021-07-05 DIAGNOSIS — K76.9 LIVER LESION: ICD-10-CM

## 2021-07-05 LAB
INR BLD: 0.99 (ref 0.9–1.2)
PROTHROMBIN TIME: 12.9 SECONDS (ref 11.8–14.5)
SARS-COV-2 RNA RESP QL NAA+PROBE: NOT DETECTED

## 2021-07-05 PROCEDURE — 85610 PROTHROMBIN TIME: CPT

## 2021-07-05 PROCEDURE — 36415 COLL VENOUS BLD VENIPUNCTURE: CPT

## 2021-07-07 ENCOUNTER — HOSPITAL ENCOUNTER (OUTPATIENT)
Dept: INTERVENTIONAL RADIOLOGY/VASCULAR | Facility: HOSPITAL | Age: 66
Discharge: HOME | End: 2021-07-07
Attending: RADIOLOGY | Admitting: RADIOLOGY
Payer: MEDICARE

## 2021-07-07 VITALS
TEMPERATURE: 99 F | WEIGHT: 167 LBS | DIASTOLIC BLOOD PRESSURE: 77 MMHG | OXYGEN SATURATION: 98 % | HEART RATE: 66 BPM | HEIGHT: 63 IN | RESPIRATION RATE: 12 BRPM | BODY MASS INDEX: 29.59 KG/M2 | SYSTOLIC BLOOD PRESSURE: 147 MMHG

## 2021-07-07 DIAGNOSIS — K76.9 LIVER LESION: ICD-10-CM

## 2021-07-07 DIAGNOSIS — Z01.818 PREOP TESTING: Primary | ICD-10-CM

## 2021-07-07 PROCEDURE — 36415 COLL VENOUS BLD VENIPUNCTURE: CPT

## 2021-07-07 PROCEDURE — 76942 ECHO GUIDE FOR BIOPSY: CPT

## 2021-07-07 PROCEDURE — BF45ZZZ ULTRASONOGRAPHY OF LIVER: ICD-10-PCS | Performed by: RADIOLOGY

## 2021-07-07 PROCEDURE — 47000 NEEDLE BIOPSY OF LIVER PERQ: CPT

## 2021-07-07 RX ORDER — MIDAZOLAM HYDROCHLORIDE 1 MG/ML
INJECTION INTRAMUSCULAR; INTRAVENOUS
Status: DISCONTINUED
Start: 2021-07-07 | End: 2021-07-07 | Stop reason: WASHOUT

## 2021-07-07 RX ORDER — SODIUM CHLORIDE 9 MG/ML
INJECTION, SOLUTION INTRAVENOUS CONTINUOUS
Status: DISCONTINUED | OUTPATIENT
Start: 2021-07-07 | End: 2021-07-07

## 2021-07-07 NOTE — PROCEDURES
[facility]  Procedure Note    Andrés Parker Patient Status:  Outpatient in a Bed    6/10/1955 MRN [id number]   Location [facility] Attending Viky Ribeiro MD   Hosp Day # 0 PCP Jonathan Pierce

## 2021-07-07 NOTE — INTERVAL H&P NOTE
The above referenced H&P was reviewed by Sofiya Espinal MD on 7/7/2021, the patient was examined and no significant changes have occurred in the patient's condition since the H&P was performed.   Risks, benefits, alternative treatments and consequences

## 2021-07-16 ENCOUNTER — TELEPHONE (OUTPATIENT)
Dept: SURGERY | Facility: CLINIC | Age: 66
End: 2021-07-16

## 2021-07-16 DIAGNOSIS — K76.9 LIVER LESION: Primary | ICD-10-CM

## 2021-07-19 ENCOUNTER — PATIENT MESSAGE (OUTPATIENT)
Dept: FAMILY MEDICINE CLINIC | Facility: CLINIC | Age: 66
End: 2021-07-19

## 2021-07-19 DIAGNOSIS — Z12.31 ENCOUNTER FOR SCREENING MAMMOGRAM FOR MALIGNANT NEOPLASM OF BREAST: Primary | ICD-10-CM

## 2021-07-20 NOTE — TELEPHONE ENCOUNTER
From: Estefani Roy  To: Juan Ramon García DO  Sent: 7/19/2021 8:23 PM CDT  Subject: Referral Request    please send a referral request for my diagnostic mammogram, I need to make an appt.  for Sept. 2021

## 2021-09-27 ENCOUNTER — PATIENT MESSAGE (OUTPATIENT)
Dept: FAMILY MEDICINE CLINIC | Facility: CLINIC | Age: 66
End: 2021-09-27

## 2021-09-27 ENCOUNTER — TELEPHONE (OUTPATIENT)
Dept: FAMILY MEDICINE CLINIC | Facility: CLINIC | Age: 66
End: 2021-09-27

## 2021-09-27 DIAGNOSIS — E55.9 VITAMIN D INSUFFICIENCY: Primary | ICD-10-CM

## 2021-09-27 NOTE — TELEPHONE ENCOUNTER
[place] from [facility] is requesting a referral for patient. Once referral is approved he would like it faxed to # [phone].  Please advise
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
## 2021-09-28 NOTE — TELEPHONE ENCOUNTER
From: Alfreda Beal  To: Lorne Rajan DO  Sent: 9/27/2021 12:54 PM CDT  Subject: I have been vaccinated     Please update my records.  I received the [place] vaccination on September 10 2021  attached is a copy of the card

## 2021-10-04 ENCOUNTER — TELEPHONE (OUTPATIENT)
Dept: CASE MANAGEMENT | Age: 66
End: 2021-10-04

## 2021-10-04 NOTE — TELEPHONE ENCOUNTER
Referral # [id number] is on OPEN status. Message routed to [facility].

## 2021-10-15 ENCOUNTER — HOSPITAL ENCOUNTER (OUTPATIENT)
Dept: MAMMOGRAPHY | Facility: HOSPITAL | Age: 66
Discharge: HOME | End: 2021-10-15
Attending: FAMILY MEDICINE
Payer: MEDICARE

## 2021-10-15 DIAGNOSIS — Z12.31 ENCOUNTER FOR SCREENING MAMMOGRAM FOR MALIGNANT NEOPLASM OF BREAST: ICD-10-CM

## 2021-10-15 PROCEDURE — 77067 SCR MAMMO BI INCL CAD: CPT | Performed by: FAMILY MEDICINE

## 2021-10-15 PROCEDURE — 77063 BREAST TOMOSYNTHESIS BI: CPT | Performed by: FAMILY MEDICINE

## 2021-10-19 ENCOUNTER — HOSPITAL ENCOUNTER (OUTPATIENT)
Dept: MRI IMAGING | Facility: HOSPITAL | Age: 66
Discharge: HOME | End: 2021-10-19
Attending: INTERNAL MEDICINE
Payer: MEDICARE

## 2021-10-19 DIAGNOSIS — K76.9 LIVER LESION: ICD-10-CM

## 2021-10-19 PROCEDURE — A9575 INJ GADOTERATE MEGLUMI 0.1ML: HCPCS | Performed by: INTERNAL MEDICINE

## 2021-10-19 PROCEDURE — 74183 MRI ABD W/O CNTR FLWD CNTR: CPT | Performed by: INTERNAL MEDICINE

## 2021-11-01 ENCOUNTER — VIRTUAL PHONE E/M (OUTPATIENT)
Dept: SURGERY | Facility: CLINIC | Age: 66
End: 2021-11-01
Payer: MEDICARE

## 2021-11-05 ENCOUNTER — OFFICE VISIT (OUTPATIENT)
Dept: FAMILY MEDICINE CLINIC | Facility: CLINIC | Age: 66
End: 2021-11-05
Payer: MEDICARE

## 2021-11-05 ENCOUNTER — LAB ENCOUNTER (OUTPATIENT)
Dept: LAB | Age: 66
End: 2021-11-05
Attending: FAMILY MEDICINE
Payer: MEDICARE

## 2021-11-05 VITALS
RESPIRATION RATE: 18 BRPM | HEART RATE: 74 BPM | WEIGHT: 163 LBS | HEIGHT: 63 IN | SYSTOLIC BLOOD PRESSURE: 120 MMHG | BODY MASS INDEX: 28.88 KG/M2 | DIASTOLIC BLOOD PRESSURE: 70 MMHG

## 2021-11-05 DIAGNOSIS — K57.90 DIVERTICULOSIS: ICD-10-CM

## 2021-11-05 DIAGNOSIS — D18.03 LIVER HEMANGIOMA: ICD-10-CM

## 2021-11-05 DIAGNOSIS — K44.9 HIATAL HERNIA: ICD-10-CM

## 2021-11-05 DIAGNOSIS — I10 ESSENTIAL HYPERTENSION: ICD-10-CM

## 2021-11-05 DIAGNOSIS — Z13.820 ENCOUNTER FOR OSTEOPOROSIS SCREENING IN ASYMPTOMATIC POSTMENOPAUSAL PATIENT: ICD-10-CM

## 2021-11-05 DIAGNOSIS — Z00.00 MEDICARE ANNUAL WELLNESS VISIT, INITIAL: Primary | ICD-10-CM

## 2021-11-05 DIAGNOSIS — E55.9 VITAMIN D INSUFFICIENCY: ICD-10-CM

## 2021-11-05 DIAGNOSIS — Z00.00 MEDICARE ANNUAL WELLNESS VISIT, INITIAL: ICD-10-CM

## 2021-11-05 DIAGNOSIS — Z78.0 ENCOUNTER FOR OSTEOPOROSIS SCREENING IN ASYMPTOMATIC POSTMENOPAUSAL PATIENT: ICD-10-CM

## 2021-11-05 PROCEDURE — 80053 COMPREHEN METABOLIC PANEL: CPT

## 2021-11-05 PROCEDURE — 84443 ASSAY THYROID STIM HORMONE: CPT

## 2021-11-05 PROCEDURE — 3008F BODY MASS INDEX DOCD: CPT | Performed by: FAMILY MEDICINE

## 2021-11-05 PROCEDURE — 36415 COLL VENOUS BLD VENIPUNCTURE: CPT

## 2021-11-05 PROCEDURE — G0439 PPPS, SUBSEQ VISIT: HCPCS | Performed by: FAMILY MEDICINE

## 2021-11-05 PROCEDURE — 80061 LIPID PANEL: CPT

## 2021-11-05 PROCEDURE — 99397 PER PM REEVAL EST PAT 65+ YR: CPT | Performed by: FAMILY MEDICINE

## 2021-11-05 PROCEDURE — 3074F SYST BP LT 130 MM HG: CPT | Performed by: FAMILY MEDICINE

## 2021-11-05 PROCEDURE — 85025 COMPLETE CBC W/AUTO DIFF WBC: CPT

## 2021-11-05 PROCEDURE — 81001 URINALYSIS AUTO W/SCOPE: CPT

## 2021-11-05 PROCEDURE — 3078F DIAST BP <80 MM HG: CPT | Performed by: FAMILY MEDICINE

## 2021-11-05 PROCEDURE — 96160 PT-FOCUSED HLTH RISK ASSMT: CPT | Performed by: FAMILY MEDICINE

## 2021-11-05 NOTE — PATIENT INSTRUCTIONS
All adult screening ordered and done appropriate for patient's age and gender and risk factors and complaints. Encouraged physical fitness and daily physical activity daily. Monitor blood pressures and record at home. Limit salt intake.   Medication revie

## 2021-11-05 NOTE — PROGRESS NOTES
Subjective:   Patient ID: Kem Michael is a 74year old female.     74year old AA female here for medicare annual visit and for status update on any confirmed chronic medical illnesses and follow up on any previous labs or procedures that were s help    Toileting: Able without help    Dressing: Able without help    Eating: Able without help    Driving: Able without help    Preparing your meals: Able without help    Managing money/bills: Able without help    Taking medications as prescribed: Able w years Colonoscopy due on 11/11/2025 Update [facility] if applicable    Flex Sigmoidoscopy Screen every 5 years No results found for this or any previous visit. No flowsheet data found.      Fecal Occult Blood Annually No results found for: FOB, OCCU results found for: A1C No flowsheet data found. Creat/alb ratio  Annually      LDL  Annually LDL Cholesterol (mg/dL)   Date Value   09/08/2020 109 (H)    No flowsheet data found. Dilated Eye exam  Annually No flowsheet data found.   No flowsheet data Encounter for osteoporosis screening in asymptomatic postmenopausal patient  Recommended and the patient has given consent. Ordered. - XR DEXA BONE DENSITOMETRY (CPT=77080); Future    No orders of the defined types were placed in this encounter.       Med

## 2022-01-17 ENCOUNTER — HOSPITAL ENCOUNTER (OUTPATIENT)
Dept: BONE DENSITY | Facility: HOSPITAL | Age: 67
Discharge: HOME | End: 2022-01-17
Attending: FAMILY MEDICINE
Payer: MEDICARE

## 2022-01-17 DIAGNOSIS — Z78.0 ENCOUNTER FOR OSTEOPOROSIS SCREENING IN ASYMPTOMATIC POSTMENOPAUSAL PATIENT: ICD-10-CM

## 2022-01-17 DIAGNOSIS — Z13.820 ENCOUNTER FOR OSTEOPOROSIS SCREENING IN ASYMPTOMATIC POSTMENOPAUSAL PATIENT: ICD-10-CM

## 2022-01-17 PROCEDURE — 77080 DXA BONE DENSITY AXIAL: CPT | Performed by: FAMILY MEDICINE

## 2022-04-21 ENCOUNTER — PATIENT MESSAGE (OUTPATIENT)
Dept: FAMILY MEDICINE CLINIC | Facility: CLINIC | Age: 67
End: 2022-04-21

## 2022-04-22 NOTE — TELEPHONE ENCOUNTER
Followed up with patient, reports ears have been bothering, some discomfort and muffled hearing, reports had in the past due to earwax buildup. Patient reports she bought Debrox drops for ear but has not been using as prescribed. Advised to use drops as directed on box to get better relief, patient has allergy symptoms, she has agreed to begin otc antihistamine. Advised will ask for ENT referral for follow up if symptoms continue, patient agreed with plan. Please advise.

## 2022-04-22 NOTE — TELEPHONE ENCOUNTER
The referral for ENT has been signed on the chart. Please call the patient and let her know. You can also place this patient in any so-called reserved slot on my schedule. If all she needs is wax removal, then we can do that.

## 2022-04-22 NOTE — TELEPHONE ENCOUNTER
From: Leobardo Lopez  To: Justyn Paul DO  Sent: 4/21/2022 4:18 PM CDT  Subject: Problem with ears    I have an annual physical scheduled for May 26. I am trying to wait until then, but my ears have been really bothering me. In the past, they would get earwax impacted, reduce my hearing and itch. I have put the ear drops as well as peroxide in them. But the ear drops just get plugged reduce my hearing, now my hears hurt and ring. Is there anything I can do to help my ears, I am trying to wait another month till my annual physical. A couple of times, in the past years, they used warm water to irrigate my ears but I would end up getting dizzy and passing out. So they sent me to a ear specialist and he removed the impacted wax.  please advise

## 2022-04-22 NOTE — TELEPHONE ENCOUNTER
Called patient, confirmed name and . She prefers to see the ENT first unless it will take a long time to get an appointment. Instructed her to schedule with ENT and then call us back if needed. Please see Dr. Brown Nito permission below that the patient can be scheduled in res24.

## 2022-04-25 ENCOUNTER — OFFICE VISIT (OUTPATIENT)
Dept: OTOLARYNGOLOGY | Facility: CLINIC | Age: 67
End: 2022-04-25
Payer: MEDICARE

## 2022-04-25 VITALS — BODY MASS INDEX: 28.7 KG/M2 | TEMPERATURE: 97 F | WEIGHT: 162 LBS | HEIGHT: 63 IN

## 2022-04-25 DIAGNOSIS — H61.23 BILATERAL IMPACTED CERUMEN: Primary | ICD-10-CM

## 2022-04-25 PROCEDURE — 99203 OFFICE O/P NEW LOW 30 MIN: CPT | Performed by: OTOLARYNGOLOGY

## 2022-04-25 PROCEDURE — 3008F BODY MASS INDEX DOCD: CPT | Performed by: OTOLARYNGOLOGY

## 2022-05-26 ENCOUNTER — LAB ENCOUNTER (OUTPATIENT)
Dept: LAB | Age: 67
End: 2022-05-26
Attending: FAMILY MEDICINE
Payer: MEDICARE

## 2022-05-26 ENCOUNTER — OFFICE VISIT (OUTPATIENT)
Dept: FAMILY MEDICINE CLINIC | Facility: CLINIC | Age: 67
End: 2022-05-26
Payer: MEDICARE

## 2022-05-26 VITALS
HEART RATE: 66 BPM | TEMPERATURE: 98 F | WEIGHT: 165.13 LBS | SYSTOLIC BLOOD PRESSURE: 130 MMHG | DIASTOLIC BLOOD PRESSURE: 70 MMHG | HEIGHT: 63 IN | BODY MASS INDEX: 29.26 KG/M2

## 2022-05-26 DIAGNOSIS — Z00.00 MEDICARE ANNUAL WELLNESS VISIT, INITIAL: Primary | ICD-10-CM

## 2022-05-26 DIAGNOSIS — I10 ESSENTIAL HYPERTENSION: ICD-10-CM

## 2022-05-26 DIAGNOSIS — M25.551 CHRONIC RIGHT HIP PAIN: ICD-10-CM

## 2022-05-26 DIAGNOSIS — M54.16 LUMBAR RADICULOPATHY: ICD-10-CM

## 2022-05-26 DIAGNOSIS — G89.29 CHRONIC RIGHT HIP PAIN: ICD-10-CM

## 2022-05-26 PROCEDURE — G0439 PPPS, SUBSEQ VISIT: HCPCS | Performed by: FAMILY MEDICINE

## 2022-05-26 PROCEDURE — 3078F DIAST BP <80 MM HG: CPT | Performed by: FAMILY MEDICINE

## 2022-05-26 PROCEDURE — 96160 PT-FOCUSED HLTH RISK ASSMT: CPT | Performed by: FAMILY MEDICINE

## 2022-05-26 PROCEDURE — 98925 OSTEOPATH MANJ 1-2 REGIONS: CPT | Performed by: FAMILY MEDICINE

## 2022-05-26 PROCEDURE — 99397 PER PM REEVAL EST PAT 65+ YR: CPT | Performed by: FAMILY MEDICINE

## 2022-05-26 PROCEDURE — 3008F BODY MASS INDEX DOCD: CPT | Performed by: FAMILY MEDICINE

## 2022-05-26 PROCEDURE — 3075F SYST BP GE 130 - 139MM HG: CPT | Performed by: FAMILY MEDICINE

## 2022-05-26 RX ORDER — AMLODIPINE BESYLATE 5 MG/1
5 TABLET ORAL DAILY
Qty: 90 TABLET | Refills: 1 | Status: SHIPPED | OUTPATIENT
Start: 2022-05-26

## 2022-05-26 NOTE — PROCEDURES
Multiple vertebral segments in the thoracic region misaligned. Manual osteopathic manipulative therapy performed and minimal to moderate relief obtained. Some spontaneous improved.

## 2022-05-27 LAB
ABSOLUTE BASOPHILS: 38 CELLS/UL (ref 0–200)
ABSOLUTE EOSINOPHILS: 49 CELLS/UL (ref 15–500)
ABSOLUTE LYMPHOCYTES: 1210 CELLS/UL (ref 850–3900)
ABSOLUTE MONOCYTES: 481 CELLS/UL (ref 200–950)
ABSOLUTE NEUTROPHILS: 3623 CELLS/UL (ref 1500–7800)
ALBUMIN/GLOBULIN RATIO: 1.4 (CALC) (ref 1–2.5)
ALBUMIN: 4.1 G/DL (ref 3.6–5.1)
ALKALINE PHOSPHATASE: 80 U/L (ref 37–153)
ALT: 12 U/L (ref 6–29)
APPEARANCE: CLEAR
AST: 18 U/L (ref 10–35)
BASOPHILS: 0.7 %
BILIRUBIN, TOTAL: 0.4 MG/DL (ref 0.2–1.2)
BILIRUBIN: NEGATIVE
BUN: 11 MG/DL (ref 7–25)
CALCIUM: 9.2 MG/DL (ref 8.6–10.4)
CARBON DIOXIDE: 30 MMOL/L (ref 20–32)
CHLORIDE: 103 MMOL/L (ref 98–110)
CHOL/HDLC RATIO: 2.7 (CALC)
CHOLESTEROL, TOTAL: 180 MG/DL
COLOR: YELLOW
CREATININE: 0.87 MG/DL (ref 0.5–0.99)
EGFR IF AFRICN AM: 79 ML/MIN/1.73M2
EGFR IF NONAFRICN AM: 68 ML/MIN/1.73M2
EOSINOPHILS: 0.9 %
GLOBULIN: 2.9 G/DL (CALC) (ref 1.9–3.7)
GLUCOSE: 65 MG/DL (ref 65–99)
GLUCOSE: NEGATIVE
HDL CHOLESTEROL: 66 MG/DL
HEMATOCRIT: 38.7 % (ref 35–45)
HEMOGLOBIN: 12.6 G/DL (ref 11.7–15.5)
KETONES: NEGATIVE
LDL-CHOLESTEROL: 100 MG/DL (CALC)
LYMPHOCYTES: 22.4 %
MCH: 28.3 PG (ref 27–33)
MCHC: 32.6 G/DL (ref 32–36)
MCV: 87 FL (ref 80–100)
MONOCYTES: 8.9 %
MPV: 12.1 FL (ref 7.5–12.5)
NEUTROPHILS: 67.1 %
NITRITE: NEGATIVE
NON-HDL CHOLESTEROL: 114 MG/DL (CALC)
OCCULT BLOOD: NEGATIVE
PH: 6.5 (ref 5–8)
PLATELET COUNT: 156 THOUSAND/UL (ref 140–400)
POTASSIUM: 3.9 MMOL/L (ref 3.5–5.3)
PROTEIN, TOTAL: 7 G/DL (ref 6.1–8.1)
PROTEIN: NEGATIVE
RDW: 13.8 % (ref 11–15)
RED BLOOD CELL COUNT: 4.45 MILLION/UL (ref 3.8–5.1)
SODIUM: 142 MMOL/L (ref 135–146)
SPECIFIC GRAVITY: 1.01 (ref 1–1.03)
TRIGLYCERIDES: 62 MG/DL
TSH W/REFLEX TO FT4: 2.55 MIU/L (ref 0.4–4.5)
WHITE BLOOD CELL COUNT: 5.4 THOUSAND/UL (ref 3.8–10.8)

## 2022-06-01 ENCOUNTER — HOSPITAL ENCOUNTER (OUTPATIENT)
Dept: GENERAL RADIOLOGY | Facility: HOSPITAL | Age: 67
Discharge: HOME | End: 2022-06-01
Attending: FAMILY MEDICINE
Payer: MEDICARE

## 2022-06-01 DIAGNOSIS — G89.29 CHRONIC RIGHT HIP PAIN: ICD-10-CM

## 2022-06-01 DIAGNOSIS — M25.551 CHRONIC RIGHT HIP PAIN: ICD-10-CM

## 2022-06-01 DIAGNOSIS — M54.16 LUMBAR RADICULOPATHY: ICD-10-CM

## 2022-06-01 PROCEDURE — 72110 X-RAY EXAM L-2 SPINE 4/>VWS: CPT | Performed by: FAMILY MEDICINE

## 2022-06-01 PROCEDURE — 73502 X-RAY EXAM HIP UNI 2-3 VIEWS: CPT | Performed by: FAMILY MEDICINE

## 2022-06-15 ENCOUNTER — PATIENT MESSAGE (OUTPATIENT)
Dept: FAMILY MEDICINE CLINIC | Facility: CLINIC | Age: 67
End: 2022-06-15

## 2022-06-16 ENCOUNTER — NURSE TRIAGE (OUTPATIENT)
Dept: FAMILY MEDICINE CLINIC | Facility: CLINIC | Age: 67
End: 2022-06-16

## 2022-06-16 DIAGNOSIS — R30.0 DYSURIA: Primary | ICD-10-CM

## 2022-06-16 NOTE — TELEPHONE ENCOUNTER
From: Bia Vale  To: Wil Mcdonald DO  Sent: 6/15/2022 1:42 PM CDT  Subject: Lower back/hip pain    I am still having pain in lower back and hip on the right side. I have been having trouble sleeping for the last week because of pain. What is the status of my medical cannabis application. I have not heard anything yet? I would like to be tested for bladder infection, strong smell urine and slight discomfort.

## 2022-06-16 NOTE — TELEPHONE ENCOUNTER
Response sent to patient to call office to discuss urine symptoms with RN team.    Dr. Carter Wilson and clinical staff, to please advise on medical cannabis form status?

## 2022-06-16 NOTE — TELEPHONE ENCOUNTER
Clinical staff - please follow up with patient regarding medical cannabis form status. Creating TE regarding possible UTI.

## 2022-06-17 ENCOUNTER — LAB ENCOUNTER (OUTPATIENT)
Dept: LAB | Facility: HOSPITAL | Age: 67
End: 2022-06-17
Attending: FAMILY MEDICINE
Payer: MEDICARE

## 2022-06-17 DIAGNOSIS — R35.0 URINARY FREQUENCY: Primary | ICD-10-CM

## 2022-06-17 DIAGNOSIS — R39.9 UTI SYMPTOMS: ICD-10-CM

## 2022-06-17 LAB
BILIRUB UR QL: NEGATIVE
CLARITY UR: CLEAR
COLOR UR: YELLOW
GLUCOSE UR-MCNC: NEGATIVE MG/DL
HGB UR QL STRIP.AUTO: NEGATIVE
KETONES UR-MCNC: NEGATIVE MG/DL
NITRITE UR QL STRIP.AUTO: NEGATIVE
PH UR: 7 [PH] (ref 5–8)
PROT UR-MCNC: NEGATIVE MG/DL
SP GR UR STRIP: 1.02 (ref 1–1.03)
UROBILINOGEN UR STRIP-ACNC: 0.2

## 2022-06-17 PROCEDURE — 87086 URINE CULTURE/COLONY COUNT: CPT

## 2022-06-17 PROCEDURE — 81001 URINALYSIS AUTO W/SCOPE: CPT | Performed by: FAMILY MEDICINE

## 2022-06-17 NOTE — TELEPHONE ENCOUNTER
Called patient, confirmed name and . Instructed on below. Patient verbalized understanding and agrees.

## 2022-06-18 NOTE — TELEPHONE ENCOUNTER
This is already been addressed. Urine study orders were already placed on the chart. The urinalysis is questionable, but not definitive and the culture is not available yet. You can do a status check on this patient's symptoms. But the patient also exaggerate her clear water intake.

## 2022-06-27 ENCOUNTER — TELEPHONE (OUTPATIENT)
Dept: FAMILY MEDICINE CLINIC | Facility: CLINIC | Age: 67
End: 2022-06-27

## 2022-06-27 RX ORDER — AMLODIPINE BESYLATE 5 MG/1
5 TABLET ORAL DAILY
Qty: 90 TABLET | Refills: 1 | Status: SHIPPED | OUTPATIENT
Start: 2022-06-27

## 2022-06-27 NOTE — TELEPHONE ENCOUNTER
[facility] [address], [address] AT [address] [facility], [phone], [phone]     Associated Reports   View Encounter   Priority and Order Details      Disp Refills Start End    amLODIPine 5 MG Oral Tab 90 tablet 1 5/26/2022     Sig - Route:  Take 1 tablet (5 mg total) by mouth daily. - Oral    Sent to pharmacy as: amLODIPine Besylate 5 MG Oral Tablet (Norvasc)    E-Prescribing Status: Receipt confirmed by pharmacy (5/26/2022 12:19 PM CDT)      Remaining refills were sent to [facility]

## 2022-07-15 ENCOUNTER — TELEPHONE (OUTPATIENT)
Dept: INTERNAL MEDICINE CLINIC | Facility: CLINIC | Age: 67
End: 2022-07-15

## 2022-07-15 NOTE — TELEPHONE ENCOUNTER
Patient dropped off Parking Placard form for completion, would like to be notified when done so she can  at office.

## 2022-07-18 ENCOUNTER — PATIENT MESSAGE (OUTPATIENT)
Dept: FAMILY MEDICINE CLINIC | Facility: CLINIC | Age: 67
End: 2022-07-18

## 2022-09-20 ENCOUNTER — PATIENT MESSAGE (OUTPATIENT)
Dept: FAMILY MEDICINE CLINIC | Facility: CLINIC | Age: 67
End: 2022-09-20

## 2022-09-21 NOTE — TELEPHONE ENCOUNTER
I spoke with the patient this morning and everything has gone through as needed. Nothing further needed here.

## 2022-09-23 ENCOUNTER — TELEPHONE (OUTPATIENT)
Dept: FAMILY MEDICINE CLINIC | Facility: CLINIC | Age: 67
End: 2022-09-23

## 2022-09-23 DIAGNOSIS — Z12.31 BREAST CANCER SCREENING BY MAMMOGRAM: Primary | ICD-10-CM

## 2022-09-25 ENCOUNTER — PATIENT MESSAGE (OUTPATIENT)
Dept: FAMILY MEDICINE CLINIC | Facility: CLINIC | Age: 67
End: 2022-09-25

## 2022-09-25 ENCOUNTER — HOSPITAL ENCOUNTER (EMERGENCY)
Facility: HOSPITAL | Age: 67
Discharge: HOME | End: 2022-09-25
Attending: EMERGENCY MEDICINE

## 2022-09-25 VITALS
TEMPERATURE: 98 F | HEART RATE: 73 BPM | DIASTOLIC BLOOD PRESSURE: 88 MMHG | SYSTOLIC BLOOD PRESSURE: 170 MMHG | BODY MASS INDEX: 30 KG/M2 | OXYGEN SATURATION: 98 % | RESPIRATION RATE: 16 BRPM | WEIGHT: 168 LBS

## 2022-09-25 DIAGNOSIS — H60.331 ACUTE SWIMMER'S EAR OF RIGHT SIDE: Primary | ICD-10-CM

## 2022-09-25 PROCEDURE — 99283 EMERGENCY DEPT VISIT LOW MDM: CPT

## 2022-09-25 RX ORDER — CIPROFLOXACIN AND DEXAMETHASONE 3; 1 MG/ML; MG/ML
4 SUSPENSION/ DROPS AURICULAR (OTIC) 2 TIMES DAILY
Qty: 7.5 ML | Refills: 0 | Status: SHIPPED | OUTPATIENT
Start: 2022-09-25 | End: 2022-10-02

## 2022-09-25 RX ORDER — NAPROXEN 500 MG/1
500 TABLET ORAL 2 TIMES DAILY PRN
Qty: 20 TABLET | Refills: 0 | Status: SHIPPED | OUTPATIENT
Start: 2022-09-25 | End: 2022-10-02

## 2022-09-25 RX ORDER — CIPROFLOXACIN AND DEXAMETHASONE 3; 1 MG/ML; MG/ML
4 SUSPENSION/ DROPS AURICULAR (OTIC) 2 TIMES DAILY
Qty: 7.5 ML | Refills: 0 | Status: SHIPPED | OUTPATIENT
Start: 2022-09-25 | End: 2022-09-25

## 2022-09-25 RX ORDER — NAPROXEN 500 MG/1
500 TABLET ORAL 2 TIMES DAILY PRN
Qty: 20 TABLET | Refills: 0 | Status: SHIPPED | OUTPATIENT
Start: 2022-09-25 | End: 2022-09-25

## 2022-09-26 ENCOUNTER — TELEPHONE (OUTPATIENT)
Dept: FAMILY MEDICINE CLINIC | Facility: CLINIC | Age: 67
End: 2022-09-26

## 2022-09-26 NOTE — TELEPHONE ENCOUNTER
Patient called back stated went to the ER    Will call back if she needs ER follow up appointment    Explained the difference between UC and ER and she asked should she have call the on-call physicians    Advised her she definitely could have but if the pain was bad  ER appropriate

## 2022-09-26 NOTE — ED INITIAL ASSESSMENT (HPI)
Patient presents to the ED c/o right ear pain since last night. Denies any cough, congestion, or fevers.

## 2022-09-26 NOTE — TELEPHONE ENCOUNTER
From: Tyrone Dubon  To: Mary Rosa DO  Sent: 9/25/2022 6:48 PM CDT  Subject: I need to make an appointment    I have shooting pains that goes from back right side of my head up scalp to middle of the back of my head. it's sporatic comes and goes maybe 10-15 minutes apart. Started yesterday evening and last night, it is easing up a little but still comes and go. Feels like an electric shock running up my head.  When can I make an appointment Juan Marin [phone]

## 2022-09-26 NOTE — TELEPHONE ENCOUNTER
----- Message from Manasa Wolfe RN sent at 9/26/2022 11:09 AM CDT -----  Regarding: FW: I need to make an appointment      ----- Message -----  From: Dimas Coto  Sent: 9/25/2022   6:48 PM CDT  To: Em Rn Triage  Subject: I need to make an appointment                    I have shooting pains that goes from  back right side of my head up scalp to middle of the back of my head. it's sporatic comes and goes maybe 10-15 minutes apart. Started yesterday evening and last night, it is easing up a little but still comes and go. Feels like an electric shock running up my head.   When can I make an appointment   Nomi Cooper [phone]

## 2022-10-12 ENCOUNTER — OFFICE VISIT (OUTPATIENT)
Dept: FAMILY MEDICINE CLINIC | Facility: CLINIC | Age: 67
End: 2022-10-12
Payer: MEDICARE

## 2022-10-12 VITALS
BODY MASS INDEX: 30.48 KG/M2 | HEART RATE: 76 BPM | TEMPERATURE: 98 F | DIASTOLIC BLOOD PRESSURE: 70 MMHG | WEIGHT: 172 LBS | OXYGEN SATURATION: 95 % | HEIGHT: 63 IN | SYSTOLIC BLOOD PRESSURE: 130 MMHG

## 2022-10-12 DIAGNOSIS — H93.8X1 EAR FULLNESS, RIGHT: Primary | ICD-10-CM

## 2022-10-12 DIAGNOSIS — L72.0 EPIDERMOID CYST OF SKIN OF BACK: ICD-10-CM

## 2022-10-12 PROCEDURE — 3008F BODY MASS INDEX DOCD: CPT

## 2022-10-12 PROCEDURE — 3078F DIAST BP <80 MM HG: CPT

## 2022-10-12 PROCEDURE — 99213 OFFICE O/P EST LOW 20 MIN: CPT

## 2022-10-12 PROCEDURE — 1126F AMNT PAIN NOTED NONE PRSNT: CPT

## 2022-10-12 PROCEDURE — 10060 I&D ABSCESS SIMPLE/SINGLE: CPT

## 2022-10-12 PROCEDURE — 3075F SYST BP GE 130 - 139MM HG: CPT

## 2022-10-18 ENCOUNTER — HOSPITAL ENCOUNTER (OUTPATIENT)
Dept: MAMMOGRAPHY | Facility: HOSPITAL | Age: 67
Discharge: HOME | End: 2022-10-18
Attending: FAMILY MEDICINE
Payer: MEDICARE

## 2022-10-18 DIAGNOSIS — Z12.31 BREAST CANCER SCREENING BY MAMMOGRAM: ICD-10-CM

## 2022-10-18 PROCEDURE — 77067 SCR MAMMO BI INCL CAD: CPT | Performed by: FAMILY MEDICINE

## 2022-10-18 PROCEDURE — 77063 BREAST TOMOSYNTHESIS BI: CPT | Performed by: FAMILY MEDICINE

## 2022-11-09 RX ORDER — AMLODIPINE BESYLATE 5 MG/1
5 TABLET ORAL DAILY
Qty: 90 TABLET | Refills: 1 | Status: SHIPPED | OUTPATIENT
Start: 2022-11-09

## 2022-11-09 NOTE — TELEPHONE ENCOUNTER
Refill passed per [facility] protocol. Requested Prescriptions   Pending Prescriptions Disp Refills    AMLODIPINE 5 MG Oral Tab [Pharmacy Med Name: AMLODIPINE BESYLATE 5 MG Tablet] 90 tablet 1     Sig: TAKE 1 TABLET EVERY DAY       Hypertensive Medications Protocol Passed - 11/9/2022  3:02 PM        Passed - In person appointment in the past 12 or next 3 months     Recent Outpatient Visits              4 weeks ago Ear fullness, right    [facility], [facility] [address], [address]    Office Visit    5 months ago Alfred Mathur annual wellness visit, initial    [facility], [facility], [place], Lisa Hernandez, DO    Office Visit    6 months ago Bilateral impacted cerumen    [facility] for Abdiaziz Menjivar MD    Office Visit    1 year ago Alfred Mathur annual wellness visit, initial    [facility], [facility], [place], Lisa Hernandez, DO    Office Visit    1 year ago     [address] [place] [address] Raymond Valles MD    Virtual Phone E/M                      Passed - Last BP reading less than 140/90     BP Readings from Last 1 Encounters:  10/12/22 : 130/70              Passed - CMP or BMP in past 6 months     Recent Results (from the past 4392 hour(s))   COMP METABOLIC PANEL (14)    Collection Time: 05/26/22 12:01 PM   Result Value Ref Range    GLUCOSE 65 65 - 99 mg/dL     Comment:               Fasting reference interval         UREA NITROGEN (BUN) 11 7 - 25 mg/dL    CREATININE 0.87 0.50 - 0.99 mg/dL     Comment: For patients >52years of age, the reference limit  for Creatinine is approximately 13% higher for people  identified as -American. eGFR NON-AFR.  AMERICAN 68 > OR = 60 mL/min/1.73m2    eGFR AFRICAN AMERICAN 79 > OR = 60 mL/min/1.73m2    BUN/CREATININE RATIO NOT APPLICABLE 6 - 22 (calc)    SODIUM 142 135 - 146 mmol/L    POTASSIUM 3.9 3.5 - 5.3 mmol/L    CHLORIDE 103 98 - 110 mmol/L    CARBON DIOXIDE 30 20 - 32 mmol/L CALCIUM 9.2 8.6 - 10.4 mg/dL    PROTEIN, TOTAL 7.0 6.1 - 8.1 g/dL    ALBUMIN 4.1 3.6 - 5.1 g/dL    GLOBULIN 2.9 1.9 - 3.7 g/dL (calc)    ALBUMIN/GLOBULIN RATIO 1.4 1.0 - 2.5 (calc)    BILIRUBIN, TOTAL 0.4 0.2 - 1.2 mg/dL    ALKALINE PHOSPHATASE 80 37 - 153 U/L    AST 18 10 - 35 U/L    ALT 12 6 - 29 U/L     *Note: Due to a large number of results and/or encounters for the requested time period, some results have not been displayed. A complete set of results can be found in Results Review.                Passed - In person appointment or virtual visit in the past 6 months     Recent Outpatient Visits              4 weeks ago Ear fullness, right    [facility] [address]    Office Visit    5 months ago [facility]    [facility], [facility], [address], [facility] Henrry Wharton DO    Office Visit    6 months ago Bilateral impacted cerumen    [facility] for Giandenice Talavera MD    Office Visit    1 year ago [employer] annual wellness visit, initial    [facility], [facility], [address], McintoshHenrry DO    Office Visit    1 year ago     [address] Col[address]res MD    Virtual Phone E/M                      Passed - EGFRCR or GFRAA > 50     GFR Evaluation  GFRAA: 79 , resulted on 5/26/2022             Recent Outpatient Visits              4 weeks ago Ear fullness, right    [facility], [facility], [address], [address], [employer] Visit    5 months ago [employer] annual wellness visit, initial    [facility], [facility], [address], McintoshHenrry DO    Office Visit    6 months ago Bilateral impacted cerumen    [facility], [address] [place] [address],[address], Tejas Cruz MD    Office Visit    1 year ago [employer] annual wellness visit, initial    [facility], [facility], [address], [facility] Henrry Wharton, [place]    Office Visit    1 year ago     [facility] Jakob Montague MD    Virtual Phone E/M

## 2022-12-27 ENCOUNTER — NURSE TRIAGE (OUTPATIENT)
Dept: FAMILY MEDICINE CLINIC | Facility: CLINIC | Age: 67
End: 2022-12-27

## 2022-12-28 ENCOUNTER — OFFICE VISIT (OUTPATIENT)
Dept: FAMILY MEDICINE CLINIC | Facility: CLINIC | Age: 67
End: 2022-12-28
Payer: MEDICARE

## 2022-12-28 VITALS
HEART RATE: 78 BPM | OXYGEN SATURATION: 98 % | BODY MASS INDEX: 30.83 KG/M2 | RESPIRATION RATE: 19 BRPM | HEIGHT: 63 IN | DIASTOLIC BLOOD PRESSURE: 78 MMHG | SYSTOLIC BLOOD PRESSURE: 120 MMHG | WEIGHT: 174 LBS

## 2022-12-28 DIAGNOSIS — I10 ESSENTIAL HYPERTENSION: ICD-10-CM

## 2022-12-28 DIAGNOSIS — K92.1 BLOOD IN STOOL: Primary | ICD-10-CM

## 2022-12-28 DIAGNOSIS — L29.9 ITCHING OF EAR: ICD-10-CM

## 2022-12-28 PROCEDURE — 99214 OFFICE O/P EST MOD 30 MIN: CPT | Performed by: FAMILY MEDICINE

## 2022-12-28 PROCEDURE — 3074F SYST BP LT 130 MM HG: CPT | Performed by: FAMILY MEDICINE

## 2022-12-28 PROCEDURE — 3078F DIAST BP <80 MM HG: CPT | Performed by: FAMILY MEDICINE

## 2022-12-28 PROCEDURE — 3008F BODY MASS INDEX DOCD: CPT | Performed by: FAMILY MEDICINE

## 2023-01-13 ENCOUNTER — TELEPHONE (OUTPATIENT)
Dept: CASE MANAGEMENT | Age: 68
End: 2023-01-13

## 2023-01-13 DIAGNOSIS — M54.30 SCIATICA, UNSPECIFIED LATERALITY: Primary | ICD-10-CM

## 2023-01-13 DIAGNOSIS — M25.511 RIGHT SHOULDER PAIN, UNSPECIFIED CHRONICITY: ICD-10-CM

## 2023-01-13 NOTE — TELEPHONE ENCOUNTER
Dr. Isidoro Gottron,     Patient requesting a referral for acupunture to Dr. Jhonathan Levy,  for sciatica and right shoulder pain. Pended referral please review diagnosis and sign off if you agree. Thank you.   Nimesh Miles

## 2023-01-14 NOTE — TELEPHONE ENCOUNTER
Referral has been signed. Please let all partners know. Hopefully this referral is within the network.

## 2023-01-24 ENCOUNTER — NURSE TRIAGE (OUTPATIENT)
Dept: FAMILY MEDICINE CLINIC | Facility: CLINIC | Age: 68
End: 2023-01-24

## 2023-01-25 ENCOUNTER — OFFICE VISIT (OUTPATIENT)
Dept: FAMILY MEDICINE CLINIC | Facility: CLINIC | Age: 68
End: 2023-01-25

## 2023-01-25 VITALS
HEIGHT: 63 IN | OXYGEN SATURATION: 98 % | WEIGHT: 176 LBS | RESPIRATION RATE: 19 BRPM | SYSTOLIC BLOOD PRESSURE: 142 MMHG | DIASTOLIC BLOOD PRESSURE: 77 MMHG | HEART RATE: 70 BPM | BODY MASS INDEX: 31.18 KG/M2

## 2023-01-25 DIAGNOSIS — M79.652 LEFT THIGH PAIN: Primary | ICD-10-CM

## 2023-01-25 DIAGNOSIS — I10 ESSENTIAL HYPERTENSION: ICD-10-CM

## 2023-01-25 PROCEDURE — 3008F BODY MASS INDEX DOCD: CPT | Performed by: FAMILY MEDICINE

## 2023-01-25 PROCEDURE — 3077F SYST BP >= 140 MM HG: CPT | Performed by: FAMILY MEDICINE

## 2023-01-25 PROCEDURE — 3078F DIAST BP <80 MM HG: CPT | Performed by: FAMILY MEDICINE

## 2023-01-25 PROCEDURE — 1125F AMNT PAIN NOTED PAIN PRSNT: CPT | Performed by: FAMILY MEDICINE

## 2023-01-25 PROCEDURE — 99214 OFFICE O/P EST MOD 30 MIN: CPT | Performed by: FAMILY MEDICINE

## 2023-02-03 ENCOUNTER — TELEPHONE (OUTPATIENT)
Dept: PHYSICAL THERAPY | Facility: HOSPITAL | Age: 68
End: 2023-02-03

## 2023-02-06 ENCOUNTER — PATIENT MESSAGE (OUTPATIENT)
Dept: FAMILY MEDICINE CLINIC | Facility: CLINIC | Age: 68
End: 2023-02-06

## 2023-02-07 ENCOUNTER — OFFICE VISIT (OUTPATIENT)
Dept: PHYSICAL THERAPY | Facility: HOSPITAL | Age: 68
End: 2023-02-07
Attending: FAMILY MEDICINE
Payer: MEDICARE

## 2023-02-07 ENCOUNTER — TELEPHONE (OUTPATIENT)
Dept: CASE MANAGEMENT | Age: 68
End: 2023-02-07

## 2023-02-07 DIAGNOSIS — M54.9 BACK PAIN, UNSPECIFIED BACK LOCATION, UNSPECIFIED BACK PAIN LATERALITY, UNSPECIFIED CHRONICITY: Primary | ICD-10-CM

## 2023-02-07 DIAGNOSIS — M25.519 SHOULDER PAIN, UNSPECIFIED CHRONICITY, UNSPECIFIED LATERALITY: ICD-10-CM

## 2023-02-07 DIAGNOSIS — M79.652 LEFT THIGH PAIN: ICD-10-CM

## 2023-02-07 PROCEDURE — 97110 THERAPEUTIC EXERCISES: CPT

## 2023-02-07 PROCEDURE — 97161 PT EVAL LOW COMPLEX 20 MIN: CPT

## 2023-02-07 NOTE — TELEPHONE ENCOUNTER
Dr. Carter Wilson,     Patient is requesting a referral for a chiropractor for back and shoulder pain. Pended referral please review diagnosis and sign off if you agree. Thank you.   Nimesh Miles

## 2023-02-07 NOTE — TELEPHONE ENCOUNTER
From: Hali Leija  To:  Carmella Roach DO  Sent: 2/6/2023 2:33 PM CST  Subject: referral for Chiropractor    would you please give me a referral for chiropractor for my lower back, neck and shoulder, it keeps getting stuff and painful. thanks

## 2023-02-09 ENCOUNTER — APPOINTMENT (OUTPATIENT)
Dept: PHYSICAL THERAPY | Facility: HOSPITAL | Age: 68
End: 2023-02-09
Payer: MEDICARE

## 2023-02-09 ENCOUNTER — TELEPHONE (OUTPATIENT)
Dept: CASE MANAGEMENT | Age: 68
End: 2023-02-09

## 2023-02-09 DIAGNOSIS — M54.9 BACK PAIN, UNSPECIFIED BACK LOCATION, UNSPECIFIED BACK PAIN LATERALITY, UNSPECIFIED CHRONICITY: Primary | ICD-10-CM

## 2023-02-09 DIAGNOSIS — M25.519 SHOULDER PAIN, UNSPECIFIED CHRONICITY, UNSPECIFIED LATERALITY: ICD-10-CM

## 2023-02-09 NOTE — TELEPHONE ENCOUNTER
Detail Level: Generalized
I also called office and requested OV notes, will await.
Per patient she needs more referral for her Chiropractor Dr Namrata Thakur Tel# [phone] for 6 more visit. Patient next appointment is on 03/09/2021.
Referral # [id number] is on OPEN status.
Referral has been pended please approve if appropriate    Managed care please assist with referral patient has an appointment on 3/9/21
Referral has been signed. Please call the patient and let her know.
Spoke to patient, informed of message.      .  Type Date User Summary Attachment    03/11/2021  8:59 AM Yanci Shin - -   Note    Managed care gets initial visit and Dr. Radha Galvan gets additional visits
Yady Figueroa,    Dr. Esther Samuel office will send the patient's last OV notes requesting additional visits. I have not received anything from Dr. Esther Samuel office yet. I will complete the referral as soon as I receive his notes.     Thank you  Gayle Cheatham
Detail Level: Detailed

## 2023-02-09 NOTE — TELEPHONE ENCOUNTER
Dr. Danuta Dubois,     Patient is requesting a referral to chiropractor, Dr. Brittani Campuzano, the previous referral for a chiropractor was for a chiropractor that is not available to see the patient. Pended referral please review diagnosis and sign off if you agree. Thank you.   Nimesh Miles

## 2023-02-10 NOTE — TELEPHONE ENCOUNTER
The referral has been signed. Please call the patient and let her know. Hopefully, this chiropractor is in her network. Thank you.

## 2023-02-16 ENCOUNTER — OFFICE VISIT (OUTPATIENT)
Dept: PHYSICAL THERAPY | Facility: HOSPITAL | Age: 68
End: 2023-02-16
Attending: FAMILY MEDICINE
Payer: MEDICARE

## 2023-02-16 PROCEDURE — 97140 MANUAL THERAPY 1/> REGIONS: CPT

## 2023-02-16 PROCEDURE — 97110 THERAPEUTIC EXERCISES: CPT

## 2023-02-20 ENCOUNTER — OFFICE VISIT (OUTPATIENT)
Dept: FAMILY MEDICINE CLINIC | Facility: CLINIC | Age: 68
End: 2023-02-20

## 2023-02-20 ENCOUNTER — TELEPHONE (OUTPATIENT)
Dept: PHYSICAL THERAPY | Facility: HOSPITAL | Age: 68
End: 2023-02-20

## 2023-02-20 ENCOUNTER — OFFICE VISIT (OUTPATIENT)
Dept: OTOLARYNGOLOGY | Facility: CLINIC | Age: 68
End: 2023-02-20

## 2023-02-20 VITALS
WEIGHT: 175 LBS | RESPIRATION RATE: 17 BRPM | HEART RATE: 69 BPM | TEMPERATURE: 98 F | DIASTOLIC BLOOD PRESSURE: 81 MMHG | SYSTOLIC BLOOD PRESSURE: 145 MMHG | OXYGEN SATURATION: 98 % | BODY MASS INDEX: 31.01 KG/M2 | HEIGHT: 63 IN

## 2023-02-20 DIAGNOSIS — H92.01 RIGHT EAR PAIN: ICD-10-CM

## 2023-02-20 DIAGNOSIS — H61.23 BILATERAL IMPACTED CERUMEN: ICD-10-CM

## 2023-02-20 DIAGNOSIS — Z28.21 VACCINATION REFUSED BY PATIENT: Primary | ICD-10-CM

## 2023-02-20 DIAGNOSIS — H92.01 RIGHT EAR PAIN: Primary | ICD-10-CM

## 2023-02-20 DIAGNOSIS — L84 CALLUS OF FOOT: ICD-10-CM

## 2023-02-20 DIAGNOSIS — L29.9 EAR ITCHING: ICD-10-CM

## 2023-02-20 PROCEDURE — 69209 REMOVE IMPACTED EAR WAX UNI: CPT | Performed by: FAMILY MEDICINE

## 2023-02-20 PROCEDURE — 99213 OFFICE O/P EST LOW 20 MIN: CPT | Performed by: OTOLARYNGOLOGY

## 2023-02-20 PROCEDURE — 99214 OFFICE O/P EST MOD 30 MIN: CPT | Performed by: FAMILY MEDICINE

## 2023-02-20 PROCEDURE — 3079F DIAST BP 80-89 MM HG: CPT | Performed by: FAMILY MEDICINE

## 2023-02-20 PROCEDURE — 3008F BODY MASS INDEX DOCD: CPT | Performed by: FAMILY MEDICINE

## 2023-02-20 PROCEDURE — 3077F SYST BP >= 140 MM HG: CPT | Performed by: FAMILY MEDICINE

## 2023-02-20 PROCEDURE — 1125F AMNT PAIN NOTED PAIN PRSNT: CPT | Performed by: FAMILY MEDICINE

## 2023-02-20 RX ORDER — CYCLOBENZAPRINE HCL 5 MG
5 TABLET ORAL NIGHTLY
Qty: 30 TABLET | Refills: 1 | Status: SHIPPED | OUTPATIENT
Start: 2023-02-20

## 2023-02-20 RX ORDER — CELECOXIB 200 MG/1
200 CAPSULE ORAL DAILY PRN
Qty: 30 CAPSULE | Refills: 0 | Status: SHIPPED | OUTPATIENT
Start: 2023-02-20 | End: 2023-03-22

## 2023-02-20 RX ORDER — LEVOCETIRIZINE DIHYDROCHLORIDE 5 MG/1
5 TABLET, FILM COATED ORAL EVERY EVENING
Qty: 30 TABLET | Refills: 1 | Status: SHIPPED | OUTPATIENT
Start: 2023-02-20

## 2023-02-20 NOTE — PATIENT INSTRUCTIONS
Xyzal tablets have been prescribed for itching. Bilateral ear irrigation also done on today. Patient might benefit from natural aloe on a Q-tip for dry ear canal skin. Patient given a recommendation to use callus pad on the right foot heel region. Patient encouraged to add carrots to her juice on a daily basis. Patient referred to ENT as there may be enough evidence for the diagnosis of cholesteatoma.

## 2023-02-23 ENCOUNTER — APPOINTMENT (OUTPATIENT)
Dept: PHYSICAL THERAPY | Facility: HOSPITAL | Age: 68
End: 2023-02-23
Attending: FAMILY MEDICINE
Payer: MEDICARE

## 2023-02-28 ENCOUNTER — TELEPHONE (OUTPATIENT)
Dept: FAMILY MEDICINE CLINIC | Facility: CLINIC | Age: 68
End: 2023-02-28

## 2023-02-28 DIAGNOSIS — M79.671 FOOT PAIN, RIGHT: ICD-10-CM

## 2023-02-28 DIAGNOSIS — L84 CALLUS OF FOOT: Primary | ICD-10-CM

## 2023-02-28 NOTE — TELEPHONE ENCOUNTER
Condition update:  Patient called (identified name and ),   Seen in office 2023. Has callus on right foot. Tried to shave, wear the pads suggested by Dr Gertrudis Gomes but she is waking up at night with pain in right foot. Discussed plan for referral to podiatry, she is agreeable. Referral pended for Dr Victorino Bergeron review. Visit note   4. Callus of foot  Would benefit from shaving of callus. Patient also may need podiatric consultation as well.

## 2023-03-02 ENCOUNTER — APPOINTMENT (OUTPATIENT)
Dept: PHYSICAL THERAPY | Facility: HOSPITAL | Age: 68
End: 2023-03-02
Attending: FAMILY MEDICINE
Payer: MEDICARE

## 2023-03-07 ENCOUNTER — APPOINTMENT (OUTPATIENT)
Dept: PHYSICAL THERAPY | Facility: HOSPITAL | Age: 68
End: 2023-03-07
Attending: FAMILY MEDICINE
Payer: MEDICARE

## 2023-03-14 ENCOUNTER — TELEPHONE (OUTPATIENT)
Dept: CASE MANAGEMENT | Age: 68
End: 2023-03-14

## 2023-03-14 ENCOUNTER — APPOINTMENT (OUTPATIENT)
Dept: PHYSICAL THERAPY | Facility: HOSPITAL | Age: 68
End: 2023-03-14
Attending: FAMILY MEDICINE
Payer: MEDICARE

## 2023-03-14 DIAGNOSIS — M54.30 SCIATICA, UNSPECIFIED LATERALITY: Primary | ICD-10-CM

## 2023-03-14 DIAGNOSIS — M25.511 RIGHT SHOULDER PAIN, UNSPECIFIED CHRONICITY: ICD-10-CM

## 2023-03-14 NOTE — TELEPHONE ENCOUNTER
Dr. Rachel Lau,     Patient is requesting a referral for acupunture to Dr. Lakshmi Carr for sciatica and right shoulder pain. Patient said this specialist is listed as acupuncture under her plan and chiropractor. Pended referral please review diagnosis and sign off if you agree. Thank you.   Nimesh Miles

## 2023-03-16 ENCOUNTER — OFFICE VISIT (OUTPATIENT)
Dept: PODIATRY CLINIC | Facility: CLINIC | Age: 68
End: 2023-03-16

## 2023-03-16 DIAGNOSIS — M76.71 PERONEUS BREVIS TENDINITIS, RIGHT: Primary | ICD-10-CM

## 2023-03-16 DIAGNOSIS — M76.61 ACHILLES BURSITIS OF RIGHT LOWER EXTREMITY: ICD-10-CM

## 2023-03-16 PROCEDURE — 99213 OFFICE O/P EST LOW 20 MIN: CPT | Performed by: STUDENT IN AN ORGANIZED HEALTH CARE EDUCATION/TRAINING PROGRAM

## 2023-03-16 PROCEDURE — 1126F AMNT PAIN NOTED NONE PRSNT: CPT | Performed by: STUDENT IN AN ORGANIZED HEALTH CARE EDUCATION/TRAINING PROGRAM

## 2023-03-16 RX ORDER — MELOXICAM 15 MG/1
15 TABLET ORAL
Qty: 30 TABLET | Refills: 1 | Status: SHIPPED | OUTPATIENT
Start: 2023-03-16

## 2023-03-24 RX ORDER — CELECOXIB 200 MG/1
CAPSULE ORAL
Qty: 30 CAPSULE | Refills: 0 | Status: SHIPPED | OUTPATIENT
Start: 2023-03-24

## 2023-03-31 ENCOUNTER — TELEPHONE (OUTPATIENT)
Dept: CASE MANAGEMENT | Age: 68
End: 2023-03-31

## 2023-03-31 DIAGNOSIS — M54.30 SCIATICA, UNSPECIFIED LATERALITY: ICD-10-CM

## 2023-03-31 DIAGNOSIS — M25.511 RIGHT SHOULDER PAIN, UNSPECIFIED CHRONICITY: Primary | ICD-10-CM

## 2023-03-31 NOTE — TELEPHONE ENCOUNTER
Dr. Tawanda Kenny,     Patient is requesting acupuncture from the chiropractor. This referral is for acupunture. Pended referral please review diagnosis and sign off if you agree. Thank you.   Nimesh Miles

## 2023-04-03 ENCOUNTER — PATIENT MESSAGE (OUTPATIENT)
Dept: OTOLARYNGOLOGY | Facility: CLINIC | Age: 68
End: 2023-04-03

## 2023-04-07 RX ORDER — MELOXICAM 15 MG/1
TABLET ORAL
Qty: 90 TABLET | Refills: 0 | Status: SHIPPED | OUTPATIENT
Start: 2023-04-07

## 2023-04-07 NOTE — TELEPHONE ENCOUNTER
Pt seen on 3/16/23  You rx'd mobic #30 w/ 1 refill. Pharmacy requesting 90 day supply. Do you approve?

## 2023-04-27 ENCOUNTER — OFFICE VISIT (OUTPATIENT)
Dept: OTOLARYNGOLOGY | Facility: CLINIC | Age: 68
End: 2023-04-27

## 2023-04-27 VITALS — HEIGHT: 64 IN | WEIGHT: 175 LBS | TEMPERATURE: 97 F | BODY MASS INDEX: 29.88 KG/M2

## 2023-04-27 DIAGNOSIS — H92.01 RIGHT EAR PAIN: Primary | ICD-10-CM

## 2023-04-27 PROCEDURE — 99213 OFFICE O/P EST LOW 20 MIN: CPT | Performed by: OTOLARYNGOLOGY

## 2023-04-27 PROCEDURE — 3008F BODY MASS INDEX DOCD: CPT | Performed by: OTOLARYNGOLOGY

## 2023-05-17 ENCOUNTER — PATIENT MESSAGE (OUTPATIENT)
Dept: FAMILY MEDICINE CLINIC | Facility: CLINIC | Age: 68
End: 2023-05-17

## 2023-05-17 DIAGNOSIS — G89.29 CHRONIC RIGHT SHOULDER PAIN: Primary | ICD-10-CM

## 2023-05-17 DIAGNOSIS — M54.30 SCIATICA, UNSPECIFIED LATERALITY: ICD-10-CM

## 2023-05-17 DIAGNOSIS — M25.511 CHRONIC RIGHT SHOULDER PAIN: Primary | ICD-10-CM

## 2023-05-19 NOTE — TELEPHONE ENCOUNTER
From: Ninfa Castillo  Sent: 5/18/2023 11:10 AM CDT  To: Em Triage Support  Subject: Acupuncture referral follow up     I want to get an acupuncture referral again for Dr. Jose R Solorzano, it was initially approved back in March, I had asked to transfer to Dr. Nathanael Lagos, but he is not able to do acupuncture, so back in April I requested to have acupuncture transferred back to Dr. Jose R Solorzano of [facility].

## 2023-05-30 ENCOUNTER — OFFICE VISIT (OUTPATIENT)
Dept: FAMILY MEDICINE CLINIC | Facility: CLINIC | Age: 68
End: 2023-05-30

## 2023-05-30 ENCOUNTER — LAB ENCOUNTER (OUTPATIENT)
Dept: LAB | Age: 68
End: 2023-05-30
Attending: FAMILY MEDICINE
Payer: MEDICARE

## 2023-05-30 VITALS
DIASTOLIC BLOOD PRESSURE: 74 MMHG | HEIGHT: 64 IN | HEART RATE: 80 BPM | BODY MASS INDEX: 30.39 KG/M2 | WEIGHT: 178 LBS | TEMPERATURE: 98 F | SYSTOLIC BLOOD PRESSURE: 120 MMHG

## 2023-05-30 DIAGNOSIS — M54.16 LUMBAR RADICULOPATHY: ICD-10-CM

## 2023-05-30 DIAGNOSIS — Z00.00 MEDICARE ANNUAL WELLNESS VISIT, INITIAL: Primary | ICD-10-CM

## 2023-05-30 DIAGNOSIS — Z28.21 PNEUMOCOCCAL VACCINATION DECLINED BY PATIENT: ICD-10-CM

## 2023-05-30 DIAGNOSIS — I10 ESSENTIAL HYPERTENSION: ICD-10-CM

## 2023-05-30 NOTE — PATIENT INSTRUCTIONS
All adult screening ordered and done appropriate for patient's age and gender and risk factors and complaints. Encouraged physical fitness and daily physical activity daily. Recommend weight loss via daily exercising and consistent healthy dietary changes. Continue with chiropractic sessions.

## 2023-05-31 LAB
ABSOLUTE BASOPHILS: 41 CELLS/UL (ref 0–200)
ABSOLUTE EOSINOPHILS: 89 CELLS/UL (ref 15–500)
ABSOLUTE LYMPHOCYTES: 1398 CELLS/UL (ref 850–3900)
ABSOLUTE MONOCYTES: 490 CELLS/UL (ref 200–950)
ABSOLUTE NEUTROPHILS: 3882 CELLS/UL (ref 1500–7800)
ALBUMIN/GLOBULIN RATIO: 1.3 (CALC) (ref 1–2.5)
ALBUMIN: 4 G/DL (ref 3.6–5.1)
ALKALINE PHOSPHATASE: 91 U/L (ref 37–153)
ALT: 21 U/L (ref 6–29)
AST: 22 U/L (ref 10–35)
BASOPHILS: 0.7 %
BILIRUBIN, TOTAL: 0.5 MG/DL (ref 0.2–1.2)
BUN: 11 MG/DL (ref 7–25)
CALCIUM: 9.1 MG/DL (ref 8.6–10.4)
CARBON DIOXIDE: 27 MMOL/L (ref 20–32)
CHLORIDE: 104 MMOL/L (ref 98–110)
CHOL/HDLC RATIO: 2.9 (CALC)
CHOLESTEROL, TOTAL: 193 MG/DL
CREATININE: 0.79 MG/DL (ref 0.5–1.05)
EGFR: 81 ML/MIN/1.73M2
EOSINOPHILS: 1.5 %
GLOBULIN: 3 G/DL (CALC) (ref 1.9–3.7)
GLUCOSE: 73 MG/DL (ref 65–99)
HDL CHOLESTEROL: 66 MG/DL
HEMATOCRIT: 40.8 % (ref 35–45)
HEMOGLOBIN: 13.2 G/DL (ref 11.7–15.5)
LDL-CHOLESTEROL: 105 MG/DL (CALC)
LYMPHOCYTES: 23.7 %
MCH: 28.3 PG (ref 27–33)
MCHC: 32.4 G/DL (ref 32–36)
MCV: 87.4 FL (ref 80–100)
MONOCYTES: 8.3 %
MPV: 11.5 FL (ref 7.5–12.5)
NEUTROPHILS: 65.8 %
NON-HDL CHOLESTEROL: 127 MG/DL (CALC)
PLATELET COUNT: 172 THOUSAND/UL (ref 140–400)
POTASSIUM: 3.7 MMOL/L (ref 3.5–5.3)
PROTEIN, TOTAL: 7 G/DL (ref 6.1–8.1)
RDW: 14.4 % (ref 11–15)
RED BLOOD CELL COUNT: 4.67 MILLION/UL (ref 3.8–5.1)
SODIUM: 141 MMOL/L (ref 135–146)
TRIGLYCERIDES: 119 MG/DL
TSH W/REFLEX TO FT4: 2.85 MIU/L (ref 0.4–4.5)
WHITE BLOOD CELL COUNT: 5.9 THOUSAND/UL (ref 3.8–10.8)

## 2023-07-20 ENCOUNTER — OFFICE VISIT (OUTPATIENT)
Dept: PODIATRY CLINIC | Facility: CLINIC | Age: 68
End: 2023-07-20

## 2023-07-20 DIAGNOSIS — M76.71 PERONEUS BREVIS TENDINITIS, RIGHT: Primary | ICD-10-CM

## 2023-07-20 DIAGNOSIS — M76.61 ACHILLES BURSITIS OF RIGHT LOWER EXTREMITY: ICD-10-CM

## 2023-07-20 PROCEDURE — 99213 OFFICE O/P EST LOW 20 MIN: CPT | Performed by: STUDENT IN AN ORGANIZED HEALTH CARE EDUCATION/TRAINING PROGRAM

## 2023-07-20 PROCEDURE — 1125F AMNT PAIN NOTED PAIN PRSNT: CPT | Performed by: STUDENT IN AN ORGANIZED HEALTH CARE EDUCATION/TRAINING PROGRAM

## 2023-07-20 PROCEDURE — 1159F MED LIST DOCD IN RCRD: CPT | Performed by: STUDENT IN AN ORGANIZED HEALTH CARE EDUCATION/TRAINING PROGRAM

## 2023-07-20 NOTE — PROGRESS NOTES
[facility], [facility]  Progress Note            HPI:       Love Love is a 77year old female. Patient presents with: Foot Pain: F/u Right foot and ankle pain 7/10 on and off, no numbness or tingling sensation. Using supportive shoes (SAS). Pt admits that she found relief with the new shoes and uses CBD oil and Coconut butter. Pt did not take meloxicam         Allergies: Lisinopril and Penicillins    Current Outpatient Medications   Medication Sig Dispense Refill    amLODIPine 5 MG Oral Tab Take 1 tablet (5 mg total) by mouth daily. 90 tablet 1    GARLIC OR Take 1 tablet by mouth daily. Misc Natural Products (GLUCOSAMINE CHONDROITIN ADV OR) Take 1 tablet by mouth daily. Coenzyme Q10 (CO Q-10 OR) Take 1 tablet by mouth daily. Lactobacillus (PROBIATA OR) Take by mouth. Past Medical History:   Diagnosis Date    Chronic back pain     Essential hypertension     High blood pressure     Tubular adenoma of colon 2020    repeat CLN in 2025      Past Surgical History:   Procedure Laterality Date    COLONOSCOPY N/A 11/11/2020    Procedure: COLONOSCOPY;  Surgeon: Abhay Hopkins MD;  Location: [address] [place] [address] ENDOSCOPY    HYSTERECTOMY        History reviewed. No pertinent family history. Social History    Socioeconomic History      Marital status:     Tobacco Use      Smoking status: Never      Smokeless tobacco: Never    Vaping Use      Vaping Use: Never used    Substance and Sexual Activity      Alcohol use: Never      Drug use: Never    Other Topics      Concerns:        Caffeine Concern: No        Exercise: Yes          REVIEW OF SYSTEMS:     Denies nause, fever, chills  No calf pain  Denies chest pain or SOB      EXAM:   There were no vitals taken for this visit. GENERAL: well developed, well nourished, in no apparent distress  EXTREMITIES:   1. Integument: Normal skin temperature and turgor  2.  Vascular: Dorsalis pedis two out of four bilateral and posterior tibial pulses two out of four bilateral, capillary refill normal.   3. Musculoskeletal: All muscle groups are graded 5 out of 5 in the foot and ankle. No pain with palpation to RLE   4. Neurological: Normal sharp dull sensation; reflexes normal.             ASSESSMENT AND PLAN:   Diagnoses and all orders for this visit:    Peroneus brevis tendinitis, right    Achilles bursitis of right lower extremity        Plan:     Continue with supportive shoes  Don't walk barefoot  Continue treatment with CBD oil/coconut oil       The patient indicates understanding of these issues and agrees to the plan.         Jazz Louise DPM

## 2023-08-24 ENCOUNTER — OFFICE VISIT (OUTPATIENT)
Dept: FAMILY MEDICINE CLINIC | Facility: CLINIC | Age: 68
End: 2023-08-24

## 2023-08-24 VITALS
DIASTOLIC BLOOD PRESSURE: 76 MMHG | WEIGHT: 177.19 LBS | OXYGEN SATURATION: 96 % | BODY MASS INDEX: 31.39 KG/M2 | TEMPERATURE: 98 F | SYSTOLIC BLOOD PRESSURE: 124 MMHG | HEART RATE: 77 BPM | HEIGHT: 63 IN

## 2023-08-24 DIAGNOSIS — M65.311 TRIGGER FINGER OF RIGHT THUMB: Primary | ICD-10-CM

## 2023-08-24 PROCEDURE — 3078F DIAST BP <80 MM HG: CPT | Performed by: FAMILY MEDICINE

## 2023-08-24 PROCEDURE — 1159F MED LIST DOCD IN RCRD: CPT | Performed by: FAMILY MEDICINE

## 2023-08-24 PROCEDURE — 99213 OFFICE O/P EST LOW 20 MIN: CPT | Performed by: FAMILY MEDICINE

## 2023-08-24 PROCEDURE — 3074F SYST BP LT 130 MM HG: CPT | Performed by: FAMILY MEDICINE

## 2023-08-24 PROCEDURE — 3008F BODY MASS INDEX DOCD: CPT | Performed by: FAMILY MEDICINE

## 2023-08-31 ENCOUNTER — OFFICE VISIT (OUTPATIENT)
Dept: SURGERY | Facility: CLINIC | Age: 68
End: 2023-08-31

## 2023-08-31 VITALS — DIASTOLIC BLOOD PRESSURE: 85 MMHG | RESPIRATION RATE: 16 BRPM | SYSTOLIC BLOOD PRESSURE: 137 MMHG | HEART RATE: 81 BPM

## 2023-08-31 DIAGNOSIS — M65.311 TRIGGER THUMB OF RIGHT HAND: Primary | ICD-10-CM

## 2023-08-31 RX ORDER — BETAMETHASONE SODIUM PHOSPHATE AND BETAMETHASONE ACETATE 3; 3 MG/ML; MG/ML
6 INJECTION, SUSPENSION INTRA-ARTICULAR; INTRALESIONAL; INTRAMUSCULAR; SOFT TISSUE ONCE
Status: COMPLETED | OUTPATIENT
Start: 2023-08-31 | End: 2023-08-31

## 2023-08-31 RX ADMIN — BETAMETHASONE SODIUM PHOSPHATE AND BETAMETHASONE ACETATE 6 MG: 3; 3 INJECTION, SUSPENSION INTRA-ARTICULAR; INTRALESIONAL; INTRAMUSCULAR; SOFT TISSUE at 15:29:00

## 2023-09-19 RX ORDER — AMLODIPINE BESYLATE 5 MG/1
5 TABLET ORAL DAILY
Qty: 90 TABLET | Refills: 2 | Status: SHIPPED | OUTPATIENT
Start: 2023-09-19

## 2023-09-19 NOTE — TELEPHONE ENCOUNTER
Refill passed per [facility] protocol. Requested Prescriptions   Pending Prescriptions Disp Refills    AMLODIPINE 5 MG Oral Tab [Pharmacy Med Name: AMLODIPINE BESYLATE 5 MG Tablet] 90 tablet 1     Sig: TAKE 1 TABLET EVERY DAY       Hypertensive Medications Protocol Passed - 9/19/2023 10:34 AM        Passed - In person appointment in the past 12 or next 3 months     Recent Outpatient Visits              2 weeks ago Trigger thumb of right hand    [facility], [address], [place]    Office Visit    3 weeks ago Trigger finger of right thumb    [facility], [address], [address] [facility], [place], [place]    Office Visit    2 months ago Peroneus brevis tendinitis, right    [facility], [place], [place]    Office Visit    3 months ago Alfred Mathur annual wellness visit, initial    [facility]aniya Wadsworth, [address], [facility], [place]    Office Visit    4 months ago Right ear pain    Taniya Wadsworth, [address] [facility] [address], Zain Deluca MD    Office Visit          Future Appointments         Provider Department Appt Notes    In 1 month [address] [facility]                     Passed - Last BP reading less than 140/90     BP Readings from Last 1 Encounters:  08/31/23 : 137/85              Passed - CMP or BMP in past 6 months     Recent Results (from the past 4392 hour(s))   CMP    Collection Time: 05/30/23 12:22 PM   Result Value Ref Range    GLUCOSE 73 65 - 99 mg/dL     Comment:               Fasting reference interval         UREA NITROGEN (BUN) 11 7 - 25 mg/dL    CREATININE 0.79 0.50 - 1.05 mg/dL    EGFR 81 > OR = 60 mL/min/1.73m2     Comment: The eGFR is based on the CKD-EPI 2021 equation.  To calculate   the new eGFR from a previous Creatinine or Cystatin C  result, go to [email or web address]  [email or web address]      BUN/CREATININE RATIO NOT APPLICABLE 6 - 22 (calc)    SODIUM 141 135 - 146 mmol/L    POTASSIUM 3.7 3.5 - 5.3 mmol/L    CHLORIDE 104 98 - 110 mmol/L    CARBON DIOXIDE 27 20 - 32 mmol/L    CALCIUM 9.1 8.6 - 10.4 mg/dL    PROTEIN, TOTAL 7.0 6.1 - 8.1 g/dL    ALBUMIN 4.0 3.6 - 5.1 g/dL    GLOBULIN 3.0 1.9 - 3.7 g/dL (calc)    ALBUMIN/GLOBULIN RATIO 1.3 1.0 - 2.5 (calc)    BILIRUBIN, TOTAL 0.5 0.2 - 1.2 mg/dL    ALKALINE PHOSPHATASE 91 37 - 153 U/L    AST 22 10 - 35 U/L    ALT 21 6 - 29 U/L     *Note: Due to a large number of results and/or encounters for the requested time period, some results have not been displayed. A complete set of results can be found in Results Review.                Passed - In person appointment or virtual visit in the past 6 months     Recent Outpatient Visits              2 weeks ago Trigger thumb of right hand    [address], [address], Joni Zaldivar MD    Office Visit    3 weeks ago Trigger finger of right thumb    [facility], [address] [place] [address]    Office Visit    2 months ago Peroneus brevis tendinitis, right    [place]    Office Visit    3 months ago Alfred Mathur annual wellness visit, initial    [facility] [place], [place]    Office Visit    4 months ago Right ear pain    [address], [address], Jose Deluca MD    Office Visit          Future Appointments         Provider Department Appt Notes    In 1 month [facility] [address] for [address] or GFRAA > 50     GFR Evaluation  EGFRCR: 81 , resulted on 5/30/2023             Future Appointments         Provider Department Appt Notes    In 1 month [facility] [address] [facility]           Recent Outpatient Visits              2 weeks ago Trigger thumb of right hand    [address] [facility], Andres Anderson MD    Office Visit    3 weeks ago Trigger finger of right thumb    [facility], [address] [facility], [address] [place], [place]    Office Visit    2 months ago Peroneus brevis tendinitis, right    [address] [facility], [place], [place]    Office Visit    3 months ago [place] annual wellness visit, initial    [address] [facility], [place], Lorna Fletcher DO    Office Visit    4 months ago Right ear pain    Katherin Gonzalez, [address] [facility], Joseph Deluca MD    Office Visit

## 2023-10-25 ENCOUNTER — HOSPITAL ENCOUNTER (OUTPATIENT)
Dept: MAMMOGRAPHY | Facility: HOSPITAL | Age: 68
Discharge: HOME | End: 2023-10-25
Attending: FAMILY MEDICINE

## 2023-10-25 DIAGNOSIS — Z12.31 ENCOUNTER FOR SCREENING MAMMOGRAM FOR MALIGNANT NEOPLASM OF BREAST: ICD-10-CM

## 2023-10-25 PROCEDURE — 77063 BREAST TOMOSYNTHESIS BI: CPT | Performed by: FAMILY MEDICINE

## 2023-10-25 PROCEDURE — 77067 SCR MAMMO BI INCL CAD: CPT | Performed by: FAMILY MEDICINE

## 2023-12-15 ENCOUNTER — TELEPHONE (OUTPATIENT)
Dept: FAMILY MEDICINE CLINIC | Facility: CLINIC | Age: 68
End: 2023-12-15

## 2023-12-15 ENCOUNTER — TELEPHONE (OUTPATIENT)
Dept: CASE MANAGEMENT | Age: 68
End: 2023-12-15

## 2023-12-15 DIAGNOSIS — M65.311 TRIGGER FINGER OF RIGHT THUMB: Primary | ICD-10-CM

## 2023-12-15 NOTE — TELEPHONE ENCOUNTER
Dr Abbi Shelton,     Patient needs referral to Dr. Sam Gonzalez for follow up appointments. Pended referral please review diagnosis and sign off if you agree. Thank you.   Nimesh Miles

## 2023-12-15 NOTE — TELEPHONE ENCOUNTER
Patient is requesting referral.     Name of specialist and specialty department : Morgan Barlow (Plastic & Reconstructive Surgery; Hand Surgery)    Reason for visit with the specialist: Continued issues with trigger finger, 2nd injection?    Address of the specialist office: [facility] [address] [address]    Appointment date: N/A         CSS informed patient the turnaround time for referral is 5-7 business days.  Patient was informed to check their MyChart account for referral status.

## 2024-01-15 ENCOUNTER — PATIENT MESSAGE (OUTPATIENT)
Dept: FAMILY MEDICINE CLINIC | Facility: CLINIC | Age: 69
End: 2024-01-15

## 2024-01-15 DIAGNOSIS — M79.671 RIGHT FOOT PAIN: Primary | ICD-10-CM

## 2024-01-16 NOTE — TELEPHONE ENCOUNTER
From: Lela Cannon  To: Evelio Patterson  Sent: 1/15/2024 8:55 PM CST  Subject: Need referral for foot doctor Radha Ball….    I need referral for small lump on right foot causing pain thank you

## 2024-01-18 ENCOUNTER — OFFICE VISIT (OUTPATIENT)
Dept: SURGERY | Facility: CLINIC | Age: 69
End: 2024-01-18
Payer: MEDICARE

## 2024-01-18 DIAGNOSIS — M65.311 TRIGGER THUMB OF RIGHT HAND: Primary | ICD-10-CM

## 2024-01-18 PROCEDURE — 1159F MED LIST DOCD IN RCRD: CPT | Performed by: PLASTIC SURGERY

## 2024-01-18 PROCEDURE — 99214 OFFICE O/P EST MOD 30 MIN: CPT | Performed by: PLASTIC SURGERY

## 2024-01-18 PROCEDURE — 1125F AMNT PAIN NOTED PAIN PRSNT: CPT | Performed by: PLASTIC SURGERY

## 2024-01-18 PROCEDURE — 1160F RVW MEDS BY RX/DR IN RCRD: CPT | Performed by: PLASTIC SURGERY

## 2024-01-18 RX ORDER — OMEGA-3S/DHA/EPA/FISH OIL/D3 1150-1000
LIQUID (ML) ORAL DAILY
COMMUNITY

## 2024-01-18 RX ORDER — CALCIUM CARBONATE/VITAMIN D3 500-10/5ML
LIQUID (ML) ORAL
COMMUNITY

## 2024-01-18 RX ORDER — MULTIVIT-MIN/IRON/FOLIC ACID/K 18-600-40
CAPSULE ORAL
COMMUNITY

## 2024-01-18 RX ORDER — MULTIVITAMIN WITH IRON
50 TABLET ORAL DAILY
COMMUNITY

## 2024-01-18 RX ORDER — HYDROCODONE BITARTRATE AND ACETAMINOPHEN 7.5; 325 MG/1; MG/1
1 TABLET ORAL
Qty: 10 TABLET | Refills: 0 | Status: SHIPPED | OUTPATIENT
Start: 2024-01-18

## 2024-01-18 RX ORDER — MAGNESIUM 200 MG
TABLET ORAL
COMMUNITY

## 2024-01-18 RX ORDER — ASCORBIC ACID 500 MG
500 TABLET ORAL DAILY
COMMUNITY

## 2024-01-18 NOTE — H&P (VIEW-ONLY)
Pacemaker: No  Latex Allergy: no  Coumadin: No  Plavix: No  Other anticoagulants: No  Cardiac stents: No    HAND DOMINANCE:  Right  Profession: Retired    RECONSTRUCTIVE HISTORY    SUN EXPOSURE   Current no   Past no   Sunburns no   Tanning salons current no   Tanning salons past no   Radiation treatments No     SKIN CANCER    Personal history of skin cancer: none    HAND     Previous hand injury (personal)    No    CURRENT:     68-year-old female right-hand-dominant with right thumb triggering    6 months duration    Injection 9/23 with relief for 2 weeks but recurrence    Homemade splint recently which immobilizes the thumb and helps the pain, but she is unable to use the hand    Since that time she has had RMF PIP pain    UPDATED [facility]       MEDICAL  Past Medical History:   Diagnosis Date    Chronic back pain     Essential hypertension     High blood pressure     Tubular adenoma of colon 2020    repeat CLN in 2025        SURGICAL  Past Surgical History:   Procedure Laterality Date    COLONOSCOPY N/A 11/11/2020    Procedure: COLONOSCOPY;  Surgeon: LUZ ELENA Reyes MD;  Location: [facility] ENDOSCOPY    HYSTERECTOMY          ALLERIGIES  Allergies   Allergen Reactions    Lisinopril ANAPHYLAXIS    Penicillins ANAPHYLAXIS        MEDICATIONS  Current Outpatient Medications   Medication Sig Dispense Refill    vitamin B-12 50 MCG Oral Tab Take 1 tablet (50 mcg total) by mouth daily.      Cholecalciferol (VITAMIN D) 50 MCG (2000 UT) Oral Cap Take by mouth.      vitamin E 100 UNITS Oral Cap Take 1 capsule (100 Units total) by mouth daily.      Vitamin C 500 MG Oral Tab Take 1 tablet (500 mg total) by mouth daily.      omega-3 Oral Liquid Take by mouth daily.      Zinc 30 MG Oral Cap Take by mouth.      Magnesium 200 MG Oral Tab Take by mouth.      amLODIPine 5 MG Oral Tab Take 1 tablet (5 mg total) by mouth daily. 90 tablet 2    GARLIC OR Take 1 tablet by mouth daily.      Misc Natural Products (GLUCOSAMINE CHONDROITIN ADV OR)  Take 1 tablet by mouth daily.      Coenzyme Q10 (CO Q-10 OR) Take 1 tablet by mouth daily.      Lactobacillus (PROBIATA OR) Take by mouth.          SOCIAL HISTORY  Social History     Socioeconomic History    Marital status:    Tobacco Use    Smoking status: Never    Smokeless tobacco: Never   Vaping Use    Vaping Use: Never used   Substance and Sexual Activity    Alcohol use: Never    Drug use: Never   Other Topics Concern    Caffeine Concern No    Exercise Yes   Social History Narrative    The patient does not use an assistive device..      The patient does live in a home with stairs.        FAMILY HISTORY  History reviewed. No pertinent family history.       PHYSICAL EXAM:     CONSTITUTIONAL: Overall appearance - Normal  HEENT: Normocephalic  EYES: Conjunctiva - Right: Normal, Left: Normal; EOMI  EARS: Inspection - Right: Normal, Left: Normal  NECK/THYROID: Inspection - Normal, Palpation - Normal, Thyroid gland - Normal, No adenopathy  RESPIRATORY: Inspection - Normal, Effort - Normal  CARDIOVASCULAR: Regular rhythm, No murmurs  ABDOMEN: Inspection - Normal, No abdominal tenderness  NEURO: Memory intact  PSYCH: Oriented to person, place, time, and situation, Appropriate mood and affect    Right thumb MP tenderness with triggering and painful locking    RMF full range of motion both PIP pain  Slight PIP edema  Volar and dorsal PIP tenderness  Collateral ligaments and volar plate intact  FDS, FDP, central slip, terminal slip intact  PIP/DIP   RCL/UCL intact      ASSESSMENT/PLAN:     Encounter Diagnosis   Name Primary?    Trigger thumb of right hand Yes       TRIGGER DIGIT right thumb      We discussed what a TRIGGER DIGIT is, including treatment options.  Questions were answered and the patient wishes to proceed with treatment.     This needs surgery.   I explained the procedure at length, including  post-operative course and risks as indicated on the Surgical Request Form.  Therapy may be necessary  post-operatively.    RISKS:     Bleeding  Infection  Scar  Pain  Stiffness  Weakness  Loss of function  Anesthesia risks             Arthritis  thumb CMC right    We discussed what arthritis is, including treatment options.  Arthritis is not curable.  Treatment is designed to try to improve symptoms and function, but this is not always possible.  Arthritis may require multiple treatments over a long period of time, and symptoms may not go away completely. Surgery is sometimes necessary.  Questions were answered and the patient wishes to proceed with treatment.    Splint - I would recommend a fadia splint.  This may not relieve the symptoms.     If symptoms are not relieved, I would recommend the patient see physiatry.          1/18/2024  Morgan Marx MD      [facility] Data Reviewed.             +++++++++++++++++++++++++++++++++++++++++++++++++    MEDICAL DECISION MAKING    PROBLEMS      MODERATE    (number / complexity)          Undiagnosed new problem with uncertain prognosis    DATA         STRAIGHTFORWARD    (amount / complexity)           MANAGEMENT RISK  HIGH    (complications/ morbidity)       Major surgery with risk factors                  MDM LEVEL    MODERATE

## 2024-01-18 NOTE — PROGRESS NOTES
Pt here for FU, R TH trigger.  LOV 8/31/23, had steroid injection, was helpful for a few weeks. Pt also given Neoprene splint, wearing at night, somewhat helpful.  Pt still has pain at base of R TH, is locking/clicking.   Pt has been wearing a homemade R TH splint for the past 2 weeks that has been helpful.    Pt has new pain RMF PIP that began 2 weeks ago.   RMF PIP pain is constant, aching.   Pt denies RMF locking.

## 2024-01-18 NOTE — PROGRESS NOTES
Patient request for surgery signed by patient and witnessed and signed by RN.  Prescription for Norco 7.5 mg electronically sent to pharmacy per Dr. Marx's order and patient instructed to  prescription before surgery.   Pre-Surgical Instruction Handout, Hand Elevation Handout, and Post-Operative Instruction Handout given to and reviewed w/patient.  All questions and concerns answered; pt verbalized an understanding of all pre-operative teaching.  Patient instructed to call the office with any further questions and/or concerns.  Patient escorted to surgery scheduling to schedule surgery and post-operative appointments.

## 2024-01-18 NOTE — H&P
Pacemaker: No  Latex Allergy: no  Coumadin: No  Plavix: No  Other anticoagulants: No  Cardiac stents: No    HAND DOMINANCE:  Right  Profession: Retired    RECONSTRUCTIVE HISTORY    SUN EXPOSURE   Current no   Past no   Sunburns no   Tanning salons current no   Tanning salons past no   Radiation treatments No     SKIN CANCER    Personal history of skin cancer: none    HAND     Previous hand injury (personal)    No    CURRENT:     68-year-old female right-hand-dominant with right thumb triggering    6 months duration    Injection 9/23 with relief for 2 weeks but recurrence    Homemade splint recently which immobilizes the thumb and helps the pain, but she is unable to use the hand    Since that time she has had RMF PIP pain    UPDATED [facility]       MEDICAL  Past Medical History:   Diagnosis Date    Chronic back pain     Essential hypertension     High blood pressure     Tubular adenoma of colon 2020    repeat CLN in 2025        SURGICAL  Past Surgical History:   Procedure Laterality Date    COLONOSCOPY N/A 11/11/2020    Procedure: COLONOSCOPY;  Surgeon: LUZ ELENA Reyes MD;  Location: [facility] ENDOSCOPY    HYSTERECTOMY          ALLERIGIES  Allergies   Allergen Reactions    Lisinopril ANAPHYLAXIS    Penicillins ANAPHYLAXIS        MEDICATIONS  Current Outpatient Medications   Medication Sig Dispense Refill    vitamin B-12 50 MCG Oral Tab Take 1 tablet (50 mcg total) by mouth daily.      Cholecalciferol (VITAMIN D) 50 MCG (2000 UT) Oral Cap Take by mouth.      vitamin E 100 UNITS Oral Cap Take 1 capsule (100 Units total) by mouth daily.      Vitamin C 500 MG Oral Tab Take 1 tablet (500 mg total) by mouth daily.      omega-3 Oral Liquid Take by mouth daily.      Zinc 30 MG Oral Cap Take by mouth.      Magnesium 200 MG Oral Tab Take by mouth.      amLODIPine 5 MG Oral Tab Take 1 tablet (5 mg total) by mouth daily. 90 tablet 2    GARLIC OR Take 1 tablet by mouth daily.      Misc Natural Products (GLUCOSAMINE CHONDROITIN ADV OR)  Take 1 tablet by mouth daily.      Coenzyme Q10 (CO Q-10 OR) Take 1 tablet by mouth daily.      Lactobacillus (PROBIATA OR) Take by mouth.          SOCIAL HISTORY  Social History     Socioeconomic History    Marital status:    Tobacco Use    Smoking status: Never    Smokeless tobacco: Never   Vaping Use    Vaping Use: Never used   Substance and Sexual Activity    Alcohol use: Never    Drug use: Never   Other Topics Concern    Caffeine Concern No    Exercise Yes   Social History Narrative    The patient does not use an assistive device..      The patient does live in a home with stairs.        FAMILY HISTORY  History reviewed. No pertinent family history.       PHYSICAL EXAM:     CONSTITUTIONAL: Overall appearance - Normal  HEENT: Normocephalic  EYES: Conjunctiva - Right: Normal, Left: Normal; EOMI  EARS: Inspection - Right: Normal, Left: Normal  NECK/THYROID: Inspection - Normal, Palpation - Normal, Thyroid gland - Normal, No adenopathy  RESPIRATORY: Inspection - Normal, Effort - Normal  CARDIOVASCULAR: Regular rhythm, No murmurs  ABDOMEN: Inspection - Normal, No abdominal tenderness  NEURO: Memory intact  PSYCH: Oriented to person, place, time, and situation, Appropriate mood and affect    Right thumb MP tenderness with triggering and painful locking    RMF full range of motion both PIP pain  Slight PIP edema  Volar and dorsal PIP tenderness  Collateral ligaments and volar plate intact  FDS, FDP, central slip, terminal slip intact  PIP/DIP   RCL/UCL intact      ASSESSMENT/PLAN:     Encounter Diagnosis   Name Primary?    Trigger thumb of right hand Yes       TRIGGER DIGIT right thumb      We discussed what a TRIGGER DIGIT is, including treatment options.  Questions were answered and the patient wishes to proceed with treatment.     This needs surgery.   I explained the procedure at length, including  post-operative course and risks as indicated on the Surgical Request Form.  Therapy may be necessary  post-operatively.    RISKS:     Bleeding  Infection  Scar  Pain  Stiffness  Weakness  Loss of function  Anesthesia risks             Arthritis  thumb CMC right    We discussed what arthritis is, including treatment options.  Arthritis is not curable.  Treatment is designed to try to improve symptoms and function, but this is not always possible.  Arthritis may require multiple treatments over a long period of time, and symptoms may not go away completely. Surgery is sometimes necessary.  Questions were answered and the patient wishes to proceed with treatment.    Splint - I would recommend a fadia splint.  This may not relieve the symptoms.     If symptoms are not relieved, I would recommend the patient see physiatry.          1/18/2024  Morgan Marx MD      [facility] Data Reviewed.             +++++++++++++++++++++++++++++++++++++++++++++++++    MEDICAL DECISION MAKING    PROBLEMS      MODERATE    (number / complexity)          Undiagnosed new problem with uncertain prognosis    DATA         STRAIGHTFORWARD    (amount / complexity)           MANAGEMENT RISK  HIGH    (complications/ morbidity)       Major surgery with risk factors                  MDM LEVEL    MODERATE

## 2024-01-24 NOTE — DISCHARGE INSTRUCTIONS
HOME INSTRUCTIONS    Dr Marx Discharge Instructions      Morgan Marx M.D.   [phone]  Plastic and Reconstructive Surgery, Hand Surgery  [address], [address]  [place] [address]     GENERAL INSTRUCTIONS:  Do not remove dressing for any reason.  Keep dressing clean and dry.  Some drainage (blood and fluid) through the dressing is expected.  Some swelling is normal.  Replace paper tapes only if they come off themselves.  You may wash the area today.  Take medications as directed.  Do not drive.    HANDS:  Keep elevated (above heart-level) at all times.  Do not try to move fingers or hand within the dressing.  Check fingertips for circulation.  Gentle fist - 10 repetitions 4 times a day.             YOU HAVE AN APPOINTMENT AT THE OFFICE ON _________________    PLEASE CALL THE OFFICE _____________________________________     AND MAKE AN APPOINTMENT FOR ______________________________            [facility] HOME CARE INSTRUCTIONS: POST-OP ANESTHESIA  The medication that you received for sedation or general anesthesia can last up to 24 hours. Your judgment and reflexes may be altered, even if you feel like your normal self.      We Recommend:   Do not drive any motor vehicle or bicycle   Avoid mowing the lawn, playing sports, or working with power tools/applicances (power saws, electric knives or mixers)   That you have someone stay with you on your first night home   Do not drink alcohol or take sleeping pills or tranquilizers   Do not sign legal documents within 24 hours of your procedure   If you had a nerve block for your surgery, take extra care not to put any pressure on your arm or hand for 24 hours    It is normal:  For you to have a sore throat if you had a breathing tube during surgery (while you were asleep!). The sore throat should get better within 48 hours. You can gargle with warm salt water (1/2 tsp in 4 oz warm water) or use a throat lozenge for comfort  To feel  muscle aches or soreness especially in the abdomen, chest or neck. The achy feeling should go away in the next 24 hours  To feel weak, sleepy or \"wiped out\". Your should start feeling better in the next 24 hours.   To experience mild discomforts such as sore lip or tongue, headache, cramps, gas pains or a bloated feeling in your abdomen.   To experience mild back pain or soreness for a day or two if you had spinal or epidural anesthesia.   If you had laparoscopic surgery, to feel shoulder pain or discomfort on the day of surgery.   For some patients to have nausea after surgery/anesthesia    If you feel nausea or experience vomiting:   Try to move around less.   Eat less than usual or drink only liquids until the next morning   Nausea should resolve in about 24 hours    If you have a problem when you are at home:    Call your surgeons office   Discharge Instructions: After Your Surgery  You’ve just had surgery. During surgery, you were given medicine called anesthesia to keep you relaxed and free of pain. After surgery, you may have some pain or nausea. This is common. Here are some tips for feeling better and getting well after surgery.   Going home  Your healthcare provider will show you how to take care of yourself when you go home. They'll also answer your questions. Have an adult family member or friend drive you home. For the first 24 hours after your surgery:   Don't drive or use heavy equipment.  Don't make important decisions or sign legal papers.  Take medicines as directed.  Don't drink alcohol.  Have someone stay with you, if needed. They can watch for problems and help keep you safe.  Be sure to go to all follow-up visits with your healthcare provider. And rest after your surgery for as long as your provider tells you to.   Coping with pain  If you have pain after surgery, pain medicine will help you feel better. Take it as directed, before pain becomes severe. Also, ask your healthcare provider or  pharmacist about other ways to control pain. This might be with heat, ice, or relaxation. And follow any other instructions your surgeon or nurse gives you.      Stay on schedule with your medicine.     Tips for taking pain medicine  To get the best relief possible, remember these points:   Pain medicines can upset your stomach. Taking them with a little food may help.  Most pain relievers taken by mouth need at least 20 to 30 minutes to start to work.  Don't wait till your pain becomes severe before you take your medicine. Try to time your medicine so that you can take it before starting an activity. This might be before you get dressed, go for a walk, or sit down for dinner.  Constipation is a common side effect of some pain medicines. Call your healthcare provider before taking any medicines such as laxatives or stool softeners to help ease constipation. Also ask if you should skip any foods. Drinking lots of fluids and eating foods such as fruits and vegetables that are high in fiber can also help. Remember, don't take laxatives unless your surgeon has prescribed them.  Drinking alcohol and taking pain medicine can cause dizziness and slow your breathing. It can even be deadly. Don't drink alcohol while taking pain medicine.  Pain medicine can make you react more slowly to things. Don't drive or run machinery while taking pain medicine.  Your healthcare provider may tell you to take acetaminophen to help ease your pain. Ask them how much you're supposed to take each day. Acetaminophen or other pain relievers may interact with your prescription medicines or other over-the-counter (OTC) medicines. Some prescription medicines have acetaminophen and other ingredients in them. Using both prescription and OTC acetaminophen for pain can cause you to accidentally overdose. Read the labels on your OTC medicines with care. This will help you to clearly know the list of ingredients, how much to take, and any warnings. It  may also help you not take too much acetaminophen. If you have questions or don't understand the information, ask your pharmacist or healthcare provider to explain it to you before you take the OTC medicine.   Managing nausea  Some people have an upset stomach (nausea) after surgery. This is often because of anesthesia, pain, or pain medicine, less movement of food in the stomach, or the stress of surgery. These tips will help you handle nausea and eat healthy foods as you get better. If you were on a special food plan before surgery, ask your healthcare provider if you should follow it while you get better. Check with your provider on how your eating should progress. It may depend on the surgery you had. These general tips may help:   Don't push yourself to eat. Your body will tell you when to eat and how much.  Start off with clear liquids and soup. They're easier to digest.  Next try semi-solid foods as you feel ready. These include mashed potatoes, applesauce, and gelatin.  Slowly move to solid foods. Don’t eat fatty, rich, or spicy foods at first.  Don't force yourself to have 3 large meals a day. Instead eat smaller amounts more often.  Take pain medicines with a small amount of solid food, such as crackers or toast. This helps prevent nausea.  When to call your healthcare provider  Call your healthcare provider right away if you have any of these:   You still have too much pain, or the pain gets worse, after taking the medicine. The medicine may not be strong enough. Or there may be a complication from the surgery.  You feel too sleepy, dizzy, or groggy. The medicine may be too strong.  Side effects such as nausea or vomiting. Your healthcare provider may advise taking other medicines to .  Skin changes such as rash, itching, or hives. This may mean you have an allergic reaction. Your provider may advise taking other medicines.  The incision looks different (for instance, part of it opens up).  Bleeding or  fluid leaking from the incision site, and weren't told to expect that.  Fever of 100.4°F (38°C) or higher, or as directed by your provider.  Call 911  Call 911 right away if you have:   Trouble breathing  Facial swelling    If you have obstructive sleep apnea   You were given anesthesia medicine during surgery to keep you comfortable and free of pain. After surgery, you may have more apnea spells because of this medicine and other medicines you were given. The spells may last longer than normal.    At home:  Keep using the continuous positive airway pressure (CPAP) device when you sleep. Unless your healthcare provider tells you not to, use it when you sleep, day or night. CPAP is a common device used to treat obstructive sleep apnea.  Talk with your provider before taking any pain medicine, muscle relaxants, or sedatives. Your provider will tell you about the possible dangers of taking these medicines.  Contact your provider if your sleeping changes a lot even when taking medicines as directed.  Celia last reviewed this educational content on 10/1/2021  © 6085-5707 [employer]. All rights reserved. This information is not intended as a substitute for professional medical care. Always follow your healthcare professional's instructions.

## 2024-01-26 ENCOUNTER — HOSPITAL ENCOUNTER (OUTPATIENT)
Facility: HOSPITAL | Age: 69
Setting detail: HOSPITAL OUTPATIENT SURGERY
Discharge: HOME | End: 2024-01-26
Attending: PLASTIC SURGERY | Admitting: PLASTIC SURGERY
Payer: MEDICARE

## 2024-01-26 ENCOUNTER — ANESTHESIA EVENT (OUTPATIENT)
Dept: SURGERY | Facility: HOSPITAL | Age: 69
End: 2024-01-26
Payer: MEDICARE

## 2024-01-26 ENCOUNTER — ANESTHESIA (OUTPATIENT)
Dept: SURGERY | Facility: HOSPITAL | Age: 69
End: 2024-01-26
Payer: MEDICARE

## 2024-01-26 ENCOUNTER — HOSPITAL DOCUMENTATION (OUTPATIENT)
Dept: SURGERY | Facility: CLINIC | Age: 69
End: 2024-01-26

## 2024-01-26 VITALS
RESPIRATION RATE: 18 BRPM | BODY MASS INDEX: 29.95 KG/M2 | OXYGEN SATURATION: 99 % | HEART RATE: 56 BPM | DIASTOLIC BLOOD PRESSURE: 70 MMHG | SYSTOLIC BLOOD PRESSURE: 136 MMHG | TEMPERATURE: 98 F | WEIGHT: 169 LBS | HEIGHT: 63 IN

## 2024-01-26 DIAGNOSIS — M65.311 TRIGGER THUMB OF RIGHT HAND: Primary | ICD-10-CM

## 2024-01-26 PROBLEM — Z04.9 OBSERVATION FOR SUSPECTED CONDITION: Status: ACTIVE | Noted: 2024-01-26

## 2024-01-26 PROCEDURE — 0LN70ZZ RELEASE RIGHT HAND TENDON, OPEN APPROACH: ICD-10-PCS | Performed by: PLASTIC SURGERY

## 2024-01-26 PROCEDURE — 26055 INCISE FINGER TENDON SHEATH: CPT | Performed by: PLASTIC SURGERY

## 2024-01-26 RX ORDER — ONDANSETRON 2 MG/ML
4 INJECTION INTRAMUSCULAR; INTRAVENOUS EVERY 6 HOURS PRN
OUTPATIENT
Start: 2024-01-26

## 2024-01-26 RX ORDER — HYDROMORPHONE HYDROCHLORIDE 1 MG/ML
0.6 INJECTION, SOLUTION INTRAMUSCULAR; INTRAVENOUS; SUBCUTANEOUS EVERY 5 MIN PRN
OUTPATIENT
Start: 2024-01-26

## 2024-01-26 RX ORDER — NALOXONE HYDROCHLORIDE 0.4 MG/ML
0.08 INJECTION, SOLUTION INTRAMUSCULAR; INTRAVENOUS; SUBCUTANEOUS AS NEEDED
OUTPATIENT
Start: 2024-01-26 | End: 2024-01-26

## 2024-01-26 RX ORDER — MORPHINE SULFATE 4 MG/ML
4 INJECTION, SOLUTION INTRAMUSCULAR; INTRAVENOUS EVERY 10 MIN PRN
OUTPATIENT
Start: 2024-01-26

## 2024-01-26 RX ORDER — HYDROMORPHONE HYDROCHLORIDE 1 MG/ML
0.4 INJECTION, SOLUTION INTRAMUSCULAR; INTRAVENOUS; SUBCUTANEOUS EVERY 5 MIN PRN
OUTPATIENT
Start: 2024-01-26

## 2024-01-26 RX ORDER — DEXAMETHASONE SODIUM PHOSPHATE 4 MG/ML
VIAL (ML) INJECTION AS NEEDED
Status: DISCONTINUED | OUTPATIENT
Start: 2024-01-26 | End: 2024-01-26 | Stop reason: SURG

## 2024-01-26 RX ORDER — MORPHINE SULFATE 10 MG/ML
6 INJECTION, SOLUTION INTRAMUSCULAR; INTRAVENOUS EVERY 10 MIN PRN
OUTPATIENT
Start: 2024-01-26

## 2024-01-26 RX ORDER — LIDOCAINE HYDROCHLORIDE 10 MG/ML
INJECTION, SOLUTION EPIDURAL; INFILTRATION; INTRACAUDAL; PERINEURAL AS NEEDED
Status: DISCONTINUED | OUTPATIENT
Start: 2024-01-26 | End: 2024-01-26 | Stop reason: SURG

## 2024-01-26 RX ORDER — ACETAMINOPHEN 500 MG
1000 TABLET ORAL ONCE
Status: COMPLETED | OUTPATIENT
Start: 2024-01-26 | End: 2024-01-26

## 2024-01-26 RX ORDER — KETOROLAC TROMETHAMINE 30 MG/ML
INJECTION, SOLUTION INTRAMUSCULAR; INTRAVENOUS AS NEEDED
Status: DISCONTINUED | OUTPATIENT
Start: 2024-01-26 | End: 2024-01-26 | Stop reason: SURG

## 2024-01-26 RX ORDER — SODIUM CHLORIDE, SODIUM LACTATE, POTASSIUM CHLORIDE, CALCIUM CHLORIDE 600; 310; 30; 20 MG/100ML; MG/100ML; MG/100ML; MG/100ML
INJECTION, SOLUTION INTRAVENOUS CONTINUOUS
OUTPATIENT
Start: 2024-01-26

## 2024-01-26 RX ORDER — HYDROMORPHONE HYDROCHLORIDE 1 MG/ML
0.2 INJECTION, SOLUTION INTRAMUSCULAR; INTRAVENOUS; SUBCUTANEOUS EVERY 5 MIN PRN
OUTPATIENT
Start: 2024-01-26

## 2024-01-26 RX ORDER — MORPHINE SULFATE 2 MG/ML
2 INJECTION, SOLUTION INTRAMUSCULAR; INTRAVENOUS EVERY 10 MIN PRN
OUTPATIENT
Start: 2024-01-26

## 2024-01-26 RX ORDER — LABETALOL HYDROCHLORIDE 5 MG/ML
5 INJECTION, SOLUTION INTRAVENOUS EVERY 5 MIN PRN
OUTPATIENT
Start: 2024-01-26 | End: 2024-01-26

## 2024-01-26 RX ORDER — HYDROCODONE BITARTRATE AND ACETAMINOPHEN 7.5; 325 MG/1; MG/1
1 TABLET ORAL EVERY 4 HOURS PRN
Status: DISCONTINUED | OUTPATIENT
Start: 2024-01-26 | End: 2024-01-26

## 2024-01-26 RX ORDER — METOCLOPRAMIDE HYDROCHLORIDE 5 MG/ML
10 INJECTION INTRAMUSCULAR; INTRAVENOUS EVERY 8 HOURS PRN
OUTPATIENT
Start: 2024-01-26

## 2024-01-26 RX ORDER — SODIUM CHLORIDE, SODIUM LACTATE, POTASSIUM CHLORIDE, CALCIUM CHLORIDE 600; 310; 30; 20 MG/100ML; MG/100ML; MG/100ML; MG/100ML
INJECTION, SOLUTION INTRAVENOUS CONTINUOUS
Status: DISCONTINUED | OUTPATIENT
Start: 2024-01-26 | End: 2024-01-26

## 2024-01-26 RX ORDER — ONDANSETRON 2 MG/ML
INJECTION INTRAMUSCULAR; INTRAVENOUS AS NEEDED
Status: DISCONTINUED | OUTPATIENT
Start: 2024-01-26 | End: 2024-01-26 | Stop reason: SURG

## 2024-01-26 RX ORDER — LIDOCAINE HYDROCHLORIDE 10 MG/ML
INJECTION, SOLUTION EPIDURAL; INFILTRATION; INTRACAUDAL; PERINEURAL AS NEEDED
Status: DISCONTINUED | OUTPATIENT
Start: 2024-01-26 | End: 2024-01-26 | Stop reason: HOSPADM

## 2024-01-26 RX ADMIN — DEXAMETHASONE SODIUM PHOSPHATE 4 MG: 4 MG/ML VIAL (ML) INJECTION at 08:59:00

## 2024-01-26 RX ADMIN — SODIUM CHLORIDE, SODIUM LACTATE, POTASSIUM CHLORIDE, CALCIUM CHLORIDE: 600; 310; 30; 20 INJECTION, SOLUTION INTRAVENOUS at 08:17:00

## 2024-01-26 RX ADMIN — KETOROLAC TROMETHAMINE 15 MG: 30 INJECTION, SOLUTION INTRAMUSCULAR; INTRAVENOUS at 09:05:00

## 2024-01-26 RX ADMIN — SODIUM CHLORIDE, SODIUM LACTATE, POTASSIUM CHLORIDE, CALCIUM CHLORIDE: 600; 310; 30; 20 INJECTION, SOLUTION INTRAVENOUS at 09:41:00

## 2024-01-26 RX ADMIN — LIDOCAINE HYDROCHLORIDE 30 MG: 10 INJECTION, SOLUTION EPIDURAL; INFILTRATION; INTRACAUDAL; PERINEURAL at 08:52:00

## 2024-01-26 RX ADMIN — ONDANSETRON 4 MG: 2 INJECTION INTRAMUSCULAR; INTRAVENOUS at 09:26:00

## 2024-01-26 NOTE — ANESTHESIA POSTPROCEDURE EVALUATION
Patient: Lela Cannon    Procedure Summary       Date: 01/26/24 Room / Location: [facility] MAIN OR 01 / [facility] MAIN OR    Anesthesia Start: 0847 Anesthesia Stop: 0941    Procedure: Trigger release, right thumb (Right) Diagnosis:       Trigger thumb of right hand      (Trigger thumb of right hand [M65.311])    Surgeons: Morgan Maldonado MD Anesthesiologist: Tony Rodriges MD    Anesthesia Type: MAC ASA Status: 2            Anesthesia Type: MAC    Vitals Value Taken Time   /78 01/26/24 0951   Temp 97.3 01/26/24 0956   Pulse 60 01/26/24 0955   Resp 13 01/26/24 0955   SpO2 96 % 01/26/24 0955   Vitals shown include unfiled device data.    [facility] AN Post Evaluation:   Patient Evaluated in PACU  Patient Participation: complete - patient participated  Level of Consciousness: awake and alert  Pain Score: 0  Pain Management: adequate  Airway Patency:patent  Dental exam unchanged from preop  Yes    Cardiovascular Status: acceptable  Respiratory Status: acceptable  Postoperative Hydration acceptable      Tony Rodriges MD  1/26/2024 9:56 AM

## 2024-01-26 NOTE — BRIEF OP NOTE
Pre-Operative Diagnosis: Trigger thumb of right hand [M65.311]     Post-Operative Diagnosis: Trigger thumb of right hand [M65.311]      Procedure Performed:   Trigger release, right thumb    Surgeon(s) and Role:     * Morgan Maldonado MD - Primary    Assistant(s):        Surgical Findings: Trigger     Specimen:      Estimated Blood Loss: 0 ml    Dictation Number:      Morgan Marx MD  1/26/2024  9:44 AM

## 2024-01-26 NOTE — INTERVAL H&P NOTE
Pre-op Diagnosis: Trigger thumb of right hand [M65.311]    The above referenced H&P was reviewed by Morgan Marx MD on 1/26/2024, the patient was examined and no significant changes have occurred in the patient's condition since the H&P was performed.  I discussed with the patient and/or legal representative the potential benefits, risks and side effects of this procedure; the likelihood of the patient achieving goals; and potential problems that might occur during recuperation.  I discussed reasonable alternatives to the procedure, including risks, benefits and side effects related to the alternatives and risks related to not receiving this procedure.  We will proceed with procedure as planned.

## 2024-01-26 NOTE — OPERATIVE REPORT
[facility]    PATIENT'S NAME: JULIANNE POPE   ATTENDING PHYSICIAN: Morgan Marx MD   OPERATING PHYSICIAN: Morgan Marx MD   PATIENT ACCOUNT#:   [id number]    LOCATION:  [facility] [address] [facility] [address]  MEDICAL RECORD #:   [id number]       YOB: 1955  ADMISSION DATE:       01/26/2024      OPERATION DATE:  01/26/2024    OPERATIVE REPORT      PREOPERATIVE DIAGNOSIS:  Right thumb trigger.  POSTOPERATIVE DIAGNOSIS:  Right thumb trigger.  PROCEDURE:  Right thumb trigger release.    INDICATIONS:  A 68-year-old female, right-hand dominant, with a 6-month history of right thumb triggering.  She was injected in September 2023 with some relief.  She now has painful triggering and inability to flex the interphalangeal joint.  She is admitted to the operating amphitheater for trigger release.   I explained the procedure at length to her, including risks of among others, bleeding, infection, scar, pain, stiffness, weakness, loss of hand function,  need for further surgery.  I answered her questions and she wishes to proceed.      FINDINGS:  The IP is 0 to 20.  The MP is tender.    OPERATIVE TECHNIQUE:  After appropriate sedation and local anesthesia was effected with 1% plain lidocaine, the area was prepped and draped in the usual sterile fashion.  A pneumatic tourniquet was inflated to 250 mmHg.    A zigzag incision was made over the first annular ligament.  We dissected the first annular ligament from surrounding structures, carefully preserving the ulnar and radial neurovascular bundles.  The first annular ligament was incised longitudinally through its length.  We took the flexor pollicis longus through full excursion.  There was no crepitation, triggering, or locking.  The skin edges were reapproximated with 4-0 nylon.  A soft dressing was placed.    The tourniquet time was released.  Total tourniquet time 18 minutes.     The patient tolerated the procedure well and left the  operating suite in satisfactory condition.     Dictated By Morgan Marx MD  d: 01/26/2024 09:46:52  t: 01/26/2024 13:03:47  [facility] [id number]  [facility]/    cc: MD Evelio Gonzalez DO

## 2024-01-26 NOTE — ANESTHESIA PREPROCEDURE EVALUATION
Anesthesia PreOp Note    HPI:     Lela Cannon is a 68 year old female who presents for preoperative consultation requested by: Morgan Maldonado MD    Date of Surgery: 1/26/2024    Procedure(s):  Trigger release, right thumb  Indication: Trigger thumb of right hand [M65.311]    Relevant Problems   No relevant active problems       NPO:  Last Liquid Consumption Date: 01/25/24  Last Liquid Consumption Time: 1900  Last Solid Consumption Date: 01/25/24  Last Solid Consumption Time: 1900  Last Liquid Consumption Date: 01/25/24          History Review:  Patient Active Problem List    Diagnosis Date Noted    Polyp of ascending colon        Past Medical History:   Diagnosis Date    Back problem     Chronic back pain     Essential hypertension     High blood pressure     Tubular adenoma of colon 2020    repeat CLN in 2025    Visual impairment        Past Surgical History:   Procedure Laterality Date    COLONOSCOPY N/A 11/11/2020    Procedure: COLONOSCOPY;  Surgeon: LUZ ELENA Reyes MD;  Location: [facility] ENDOSCOPY    HYSTERECTOMY         Medications Prior to Admission   Medication Sig Dispense Refill Last Dose    vitamin B-12 50 MCG Oral Tab Take 1 tablet (50 mcg total) by mouth daily.   1/22/2024    Cholecalciferol (VITAMIN D) 50 MCG (2000 UT) Oral Cap Take by mouth.   1/22/2024    vitamin E 100 UNITS Oral Cap Take 1 capsule (100 Units total) by mouth daily.   1/22/2024    Vitamin C 500 MG Oral Tab Take 1 tablet (500 mg total) by mouth daily.   1/22/2024    omega-3 Oral Liquid Take by mouth daily.   1/22/2024    Zinc 30 MG Oral Cap Take by mouth.   1/22/2024    Magnesium 200 MG Oral Tab Take by mouth.   1/22/2024    HYDROcodone-acetaminophen 7.5-325 MG Oral Tab Take 1 tablet by mouth every 4 to 6 hours as needed for Pain. 10 tablet 0     amLODIPine 5 MG Oral Tab Take 1 tablet (5 mg total) by mouth daily. 90 tablet 2 1/26/2024 at 0600    GARLIC OR Take 1 tablet by mouth daily.   1/22/2024    Misc Natural  Products (GLUCOSAMINE CHONDROITIN ADV OR) Take 1 tablet by mouth daily.   1/22/2024    Coenzyme Q10 (CO Q-10 OR) Take 1 tablet by mouth daily.   1/22/2024    Lactobacillus (PROBIATA OR) Take by mouth.   1/22/2024     Current Facility-Administered Medications Ordered in Epic   Medication Dose Route Frequency Provider Last Rate Last Admin    lactated ringers infusion   Intravenous Continuous Morgan Maldonado MD 20 mL/hr at 01/26/24 0728 New Bag at 01/26/24 0728     No current [facility]-ordered outpatient medications on file.       Allergies   Allergen Reactions    Lisinopril ANAPHYLAXIS    Penicillins ANAPHYLAXIS       History reviewed. No pertinent family history.  Social History     Socioeconomic History    Marital status:    Tobacco Use    Smoking status: Never    Smokeless tobacco: Never   Vaping Use    Vaping Use: Never used   Substance and Sexual Activity    Alcohol use: Never    Drug use: Never   Other Topics Concern    Caffeine Concern No    Exercise Yes       Available pre-op labs reviewed.             Vital Signs:  Body mass index is 29.94 kg/m².   height is 1.6 m (5' 3\") and weight is 76.7 kg (169 lb).   Vitals:    01/23/24 0921 01/26/24 0651   Weight: 76.2 kg (168 lb) 76.7 kg (169 lb)   Height: 1.6 m (5' 3\")         Anesthesia Evaluation     Patient summary reviewed and Nursing notes reviewed    Airway   Mallampati: II  TM distance: <3 FB  Neck ROM: full  Dental      Pulmonary     breath sounds clear to auscultation  Cardiovascular   (+) hypertension    Rhythm: regular  Rate: normal    Neuro/Psych    (+)  neuromuscular disease (Chronic LBP),        GI/Hepatic/Renal      Endo/Other - negative ROS   Abdominal                  Anesthesia Plan:   ASA:  2  Plan:   MAC  Informed Consent Plan and Risks Discussed With:  Patient and spouse  Discussed plan with:  Attending      I have informed Lela Cannon and/or legal guardian or family member of the nature of the anesthetic plan, benefits, risks  including possible dental damage if relevant, major complications, and any alternative forms of anesthetic management.   All of the patient's questions were answered to the best of my ability. The patient desires the anesthetic management as planned.  Tony Rodriges MD  1/26/2024 7:51 AM  Present on Admission:  **None**

## 2024-01-27 ENCOUNTER — TELEPHONE (OUTPATIENT)
Dept: SURGERY | Facility: CLINIC | Age: 69
End: 2024-01-27

## 2024-01-27 NOTE — TELEPHONE ENCOUNTER
fanny message with MD recommendation sent to pt.     Future Appointments   Date Time Provider Department Center   1/29/2024 12:00 PM Robert Cabrera OTRL [facility]   2/8/2024 12:00 PM Robert Cabrera OTRL [facility]   2/22/2024 11:50 AM [facility]

## 2024-01-27 NOTE — TELEPHONE ENCOUNTER
Left message for post-operative patient to please call the office with any questions and/or concerns. Reminded patient of next OT appt on 1/29/24 at 12:00 PM.  Dr. Marx notified.

## 2024-01-29 ENCOUNTER — OFFICE VISIT (OUTPATIENT)
Dept: SURGERY | Facility: CLINIC | Age: 69
End: 2024-01-29
Payer: MEDICARE

## 2024-01-29 DIAGNOSIS — M62.81 DISTAL MUSCLE WEAKNESS: Primary | ICD-10-CM

## 2024-01-29 DIAGNOSIS — M25.641 JOINT STIFFNESS OF HAND, RIGHT: ICD-10-CM

## 2024-01-29 PROCEDURE — 1159F MED LIST DOCD IN RCRD: CPT | Performed by: OCCUPATIONAL THERAPIST

## 2024-01-29 NOTE — PROGRESS NOTES
Right Thumb Trigger Release Post - Op Note:    Subjective: Everything went well on Friday.      Objective:  Occupational Therapy completed the following educational areas status post elective Right Trigger Thumb Release procedure:    1)  Dressing was removed and handwashing technique was reviewed using anti bacterial soap.    2)  Dressing was changed using x 1 cloth band aids and polysporin.    3)  Home exercise program reviewed and written handout provided for further reference:     A)   Tendon gliding exercises, x 10 reps per set, x 8 sets per day.     B)   AROM:   Full fisting exercises, x 20 reps per set, x 5 sets per day.     C)   Wrist flexion and extension exercises, x 20 reps per set, x 5 sets per day.    Note:  Instructed patient that exercises should not be painful.      Assessment: Patient verbalized and demonstrated independence with prescribed home exercise program and dressing change technique.    Plan: Patient will return to the clinic for suture removal as indicated by protocol.      Robert Cabrera  OTR/L

## 2024-02-08 ENCOUNTER — OFFICE VISIT (OUTPATIENT)
Dept: SURGERY | Facility: CLINIC | Age: 69
End: 2024-02-08
Payer: MEDICARE

## 2024-02-08 DIAGNOSIS — M62.81 DISTAL MUSCLE WEAKNESS: Primary | ICD-10-CM

## 2024-02-08 DIAGNOSIS — M25.641 JOINT STIFFNESS OF HAND, RIGHT: ICD-10-CM

## 2024-02-08 PROCEDURE — 97166 OT EVAL MOD COMPLEX 45 MIN: CPT | Performed by: OCCUPATIONAL THERAPIST

## 2024-02-08 PROCEDURE — 1159F MED LIST DOCD IN RCRD: CPT | Performed by: OCCUPATIONAL THERAPIST

## 2024-02-08 NOTE — PROGRESS NOTES
OCCUPATIONAL THERAPY EVALUATION:   Lela Acostaley   [id number]       SUBJECTIVE:    HX of Injury: Right thumb triggering  Chief Complaint:  Right thumb tightness..    Precautions: None  Premorbid Functional Status: Independent w/ ADL's  Current Level of Function: As noted above  Employment: Retired  Hand Dominance: right  Living Situation: w/ spouse  Barriers to Learning: None  Patient Goals: Full use of the right hand.    Imaging/Tests: N/A        OBJECTIVE DATA:   PAIN:   Rating (1/10): 1/10 at rest, 2/10 with activity  Location:     OBSERVATION:   Guarding movements    ORTHOTICS:    N/A    SCAR/INCISION: Flat, Non-adhered, and Flexible    SENSORY:  Intact      AROM/PROM:  (Degrees)  RIGHT HAND:    Thumb IF MF RF SF   MP 40       PIP 40       DIP        CORONA 80 degrees of CORONA                 STRENGTH: (lbs) Right Average Left Average   : NT NT   2 pt Pinch:     3 pt Pinch:     Lateral Pinch:         ASSESSMENT & PLAN OF CARE:    Treatment Provided: Patient was seen for an initial evaluation, hand washing: Sutures were removed per order: Reviewed cold cream scar massage technique  HEP:  AROM, Tendon glides, x 20 reps per set, x 5 sets daily. Reviewed hand elevation importance. Written handout was provided to reinforce today's treatment and educational session.       Rehabilitation Potential: Good    CLINICAL ASSESSMENT:    Patient/Caregiver Education Provided: Yes    Treatment Plan:  Therapeutic Exercise  Therapeutic Activities  Scar Management  Patient/Family Education    GOALS:  Short term goals to be reached in x 1 session:    1) Independent with HEP..    Long term goals to be reached in x 1 week.    1) Full functional use of the involved extremity for self-care, leisure and work related tasks:.        Patient was seen 1 x /week for 2 weeks or a total of 2 visits.   Pt. was advised regarding the findings of this evaluation and agrees to the plan of care.     FLORENTINO Sarmiento     I have reviewed the  treatment plan and concur.   Morgan Marx MD

## 2024-03-07 ENCOUNTER — OFFICE VISIT (OUTPATIENT)
Dept: PODIATRY CLINIC | Facility: CLINIC | Age: 69
End: 2024-03-07
Payer: MEDICARE

## 2024-03-07 DIAGNOSIS — L85.3 XEROSIS OF SKIN: Primary | ICD-10-CM

## 2024-03-07 PROCEDURE — 1159F MED LIST DOCD IN RCRD: CPT | Performed by: STUDENT IN AN ORGANIZED HEALTH CARE EDUCATION/TRAINING PROGRAM

## 2024-03-07 PROCEDURE — 99213 OFFICE O/P EST LOW 20 MIN: CPT | Performed by: STUDENT IN AN ORGANIZED HEALTH CARE EDUCATION/TRAINING PROGRAM

## 2024-03-07 PROCEDURE — 1125F AMNT PAIN NOTED PAIN PRSNT: CPT | Performed by: STUDENT IN AN ORGANIZED HEALTH CARE EDUCATION/TRAINING PROGRAM

## 2024-03-07 RX ORDER — AMMONIUM LACTATE 12 G/100G
1 LOTION TOPICAL AS NEEDED
Qty: 225 G | Refills: 3 | Status: SHIPPED | OUTPATIENT
Start: 2024-03-07

## 2024-03-07 NOTE — PROGRESS NOTES
[facility] Podiatry  Progress Note      Lela Cannon is a 68 year old female.   Chief Complaint   Patient presents with    Foot Pain     Right f/u - has a little lump on the medial aspect of her foot for the past months - she has some pain rated 1-5/10 on and off              HPI:     Pt is a pleasant 68 year old female presents to clinic today for evaluation the medial aspect right first metatarsal.  She admits that the site sometimes swells and becomes painful depending on the shoe type.  She admits that the tendinitis of the right peroneus brevis has improved.    Allergies: Lisinopril and Penicillins    Current Outpatient Medications   Medication Sig Dispense Refill    vitamin B-12 50 MCG Oral Tab Take 1 tablet (50 mcg total) by mouth daily.      Cholecalciferol (VITAMIN D) 50 MCG (2000 UT) Oral Cap Take by mouth.      vitamin E 100 UNITS Oral Cap Take 1 capsule (100 Units total) by mouth daily.      Vitamin C 500 MG Oral Tab Take 1 tablet (500 mg total) by mouth daily.      omega-3 Oral Liquid Take by mouth daily.      Zinc 30 MG Oral Cap Take by mouth.      Magnesium 200 MG Oral Tab Take by mouth.      HYDROcodone-acetaminophen 7.5-325 MG Oral Tab Take 1 tablet by mouth every 4 to 6 hours as needed for Pain. 10 tablet 0    amLODIPine 5 MG Oral Tab Take 1 tablet (5 mg total) by mouth daily. 90 tablet 2    GARLIC OR Take 1 tablet by mouth daily.      Misc Natural Products (GLUCOSAMINE CHONDROITIN ADV OR) Take 1 tablet by mouth daily.      Coenzyme Q10 (CO Q-10 OR) Take 1 tablet by mouth daily.      Lactobacillus (PROBIATA OR) Take by mouth.        Past Medical History:   Diagnosis Date    Back problem     Chronic back pain     Essential hypertension     High blood pressure     Trigger thumb, right thumb 01/18/2024    Tubular adenoma of colon 2020    repeat CLN in 2025    Visual impairment       Past Surgical History:   Procedure Laterality Date    COLONOSCOPY N/A 11/11/2020    Procedure:  COLONOSCOPY;  Surgeon: LUZ ELENA Reyes MD;  Location: [facility] ENDOSCOPY    HYSTERECTOMY      INCISE FINGER TENDON SHEATH Right 01/26/2024    trigger release right thumb      History reviewed. No pertinent family history.   Social History     Socioeconomic History    Marital status:    Tobacco Use    Smoking status: Never    Smokeless tobacco: Never   Vaping Use    Vaping Use: Never used   Substance and Sexual Activity    Alcohol use: Never    Drug use: Never   Other Topics Concern    Caffeine Concern No    Exercise Yes           REVIEW OF SYSTEMS:     Denies nause, fever, chills  No calf pain  Denies chest pain or SOB      EXAM:   There were no vitals taken for this visit.  GENERAL: well developed, well nourished, in no apparent distress  EXTREMITIES:   1. Integument: Normal skin temperature and turgor.  Hyperkeratotic tissue on the plantar aspect of the right heel  2. Vascular: Dorsalis pedis two out of four bilateral and posterior tibial pulses two out of   four bilateral, capillary refill normal.   3. Musculoskeletal: All muscle groups are graded 5 out of 5 in the foot and ankle.  Mild first metatarsal medial prominence   4. Neurological: Normal sharp dull sensation; reflexes normal.             ASSESSMENT AND PLAN:   There are no diagnoses linked to this encounter.    Plan:     Patient seen and examined and findings discussed with patient.    Discussed etiology of condition along with treatment options  Pared  Hyperkeratotic lesion on right foot with sterile #15 blade to healthy skin  Advised patient to moisturize her feet daily  Informed patient to wear shoes with a wider forefoot to avoid any blisters at the right foot bunion site  Return to clinic as needed    The patient indicates understanding of these issues and agrees to the plan.        Radha Baxter DPM

## 2024-03-09 ENCOUNTER — TELEPHONE (OUTPATIENT)
Dept: FAMILY MEDICINE CLINIC | Facility: CLINIC | Age: 69
End: 2024-03-09

## 2024-04-08 ENCOUNTER — TELEPHONE (OUTPATIENT)
Dept: FAMILY MEDICINE CLINIC | Facility: CLINIC | Age: 69
End: 2024-04-08

## 2024-04-08 RX ORDER — AMLODIPINE BESYLATE 5 MG/1
5 TABLET ORAL DAILY
Qty: 15 TABLET | Refills: 0 | OUTPATIENT
Start: 2024-04-08

## 2024-04-08 NOTE — TELEPHONE ENCOUNTER
Pt would like to know if the doctor can give her a partial refill on her amlodipine medication. Pt states that she will be receiving a shipment from the Mail order Pharmacy, but, it will take 7 days for her to receive it. Pt would like to know if the doctor can give her a refill for 7 days and sent to the local pharmacy. Pharmacy: [place], [place] (listed) pt is out of medication.     Current Outpatient Medications   Medication Sig Dispense Refill    amLODIPine 5 MG Oral Tab Take 1 tablet (5 mg total) by mouth daily. 90 tablet 2

## 2024-04-08 NOTE — TELEPHONE ENCOUNTER
Dr. Patterson - please see note below, Rx pended for temporary supply until she gets mail-order Rx.

## 2024-06-21 ENCOUNTER — OFFICE VISIT (OUTPATIENT)
Dept: FAMILY MEDICINE CLINIC | Facility: CLINIC | Age: 69
End: 2024-06-21

## 2024-06-21 VITALS
OXYGEN SATURATION: 97 % | BODY MASS INDEX: 30 KG/M2 | DIASTOLIC BLOOD PRESSURE: 82 MMHG | TEMPERATURE: 98 F | HEART RATE: 86 BPM | RESPIRATION RATE: 18 BRPM | SYSTOLIC BLOOD PRESSURE: 140 MMHG | WEIGHT: 169 LBS

## 2024-06-21 DIAGNOSIS — I10 ESSENTIAL HYPERTENSION: ICD-10-CM

## 2024-06-21 DIAGNOSIS — L29.9 ITCHING OF EAR: ICD-10-CM

## 2024-06-21 DIAGNOSIS — E55.9 VITAMIN D INSUFFICIENCY: ICD-10-CM

## 2024-06-21 DIAGNOSIS — H61.23 BILATERAL IMPACTED CERUMEN: ICD-10-CM

## 2024-06-21 DIAGNOSIS — Z12.11 COLON CANCER SCREENING: ICD-10-CM

## 2024-06-21 DIAGNOSIS — R09.81 NASAL SINUS CONGESTION: ICD-10-CM

## 2024-06-21 DIAGNOSIS — Z00.00 MEDICARE ANNUAL WELLNESS VISIT, SUBSEQUENT: Primary | ICD-10-CM

## 2024-06-21 DIAGNOSIS — E53.8 VITAMIN B12 DEFICIENCY: ICD-10-CM

## 2024-06-21 LAB
BILIRUB UR QL: NEGATIVE
CLARITY UR: CLEAR
COLOR UR: COLORLESS
GLUCOSE UR-MCNC: NORMAL MG/DL
HGB UR QL STRIP.AUTO: NEGATIVE
KETONES UR-MCNC: NEGATIVE MG/DL
LEUKOCYTE ESTERASE UR QL STRIP.AUTO: NEGATIVE
NITRITE UR QL STRIP.AUTO: NEGATIVE
PH UR: 5.5 [PH] (ref 5–8)
PROT UR-MCNC: NEGATIVE MG/DL
SP GR UR STRIP: <1.005 (ref 1–1.03)
UROBILINOGEN UR STRIP-ACNC: NORMAL
VIT B12 SERPL-MCNC: >2000 PG/ML (ref 211–911)
VIT D+METAB SERPL-MCNC: 83.2 NG/ML (ref 30–100)

## 2024-06-21 PROCEDURE — 84630 ASSAY OF ZINC: CPT | Performed by: FAMILY MEDICINE

## 2024-06-21 PROCEDURE — 81003 URINALYSIS AUTO W/O SCOPE: CPT | Performed by: FAMILY MEDICINE

## 2024-06-21 PROCEDURE — 82607 VITAMIN B-12: CPT | Performed by: FAMILY MEDICINE

## 2024-06-21 PROCEDURE — 82306 VITAMIN D 25 HYDROXY: CPT | Performed by: FAMILY MEDICINE

## 2024-06-21 NOTE — PATIENT INSTRUCTIONS
All adult screening ordered and done appropriate for patient's age and gender and risk factors and complaints.  Recommend weight loss via daily exercising and consistent healthy dietary changes.  Medication reviewed and renewed where needed and appropriate.  Comply with medications.  Monitor blood pressures and record at home. Limit salt intake.  To GI.

## 2024-06-22 NOTE — PROGRESS NOTES
Subjective:     Patient ID: Lela Cannon is a 69 year old female.    This patient is an established 69-year-old hypertensive -American female here for complete preventive care physical and for status update on any confirmed chronic medical illnesses and follow up on any previous labs or procedures that were suggestive or in need of further work up. Colonoscopy is current. Bowel and bladder functions are intact.     Patient denies headaches, chest pain, dizziness, shortness of breath, visual changes, and/or exertional fatigue.    Patient is requesting in addition to preventive care labs to get levels on her vitamin D3 and B12 and zinc and mercury.    Patient wants her ears to be checked as she feels as though they have clogged the.            History/Other:   Review of Systems  Current Outpatient Medications   Medication Sig Dispense Refill    amLODIPine 5 MG Oral Tab Take 1 tablet (5 mg total) by mouth daily. 15 tablet 0    vitamin B-12 50 MCG Oral Tab Take 1 tablet (50 mcg total) by mouth daily.      Cholecalciferol (VITAMIN D) 50 MCG (2000 UT) Oral Cap Take by mouth.      vitamin E 100 UNITS Oral Cap Take 1 capsule (100 Units total) by mouth daily.      Vitamin C 500 MG Oral Tab Take 1 tablet (500 mg total) by mouth daily.      omega-3 Oral Liquid Take by mouth daily.      Zinc 30 MG Oral Cap Take by mouth.      Magnesium 200 MG Oral Tab Take by mouth.      GARLIC OR Take 1 tablet by mouth daily.      Misc Natural Products (GLUCOSAMINE CHONDROITIN ADV OR) Take 1 tablet by mouth daily.      Coenzyme Q10 (CO Q-10 OR) Take 1 tablet by mouth daily.      Lactobacillus (PROBIATA OR) Take by mouth.      ammonium lactate (AMLACTIN DAILY) 12 % External Lotion Apply 1 Application topically as needed for Dry Skin. 225 g 3    HYDROcodone-acetaminophen 7.5-325 MG Oral Tab Take 1 tablet by mouth every 4 to 6 hours as needed for Pain. 10 tablet 0     Allergies:  Allergies   Allergen Reactions    Lisinopril  ANAPHYLAXIS    Penicillins ANAPHYLAXIS       Past Medical History:    Back problem    Chronic back pain    Essential hypertension    High blood pressure    Trigger thumb, right thumb    Tubular adenoma of colon    repeat CLN in 2025    Visual impairment      Past Surgical History:   Procedure Laterality Date    Colonoscopy N/A 11/11/2020    Procedure: COLONOSCOPY;  Surgeon: LUZ ELENA Reyes MD;  Location: [facility] ENDOSCOPY    Hysterectomy      Incise finger tendon sheath Right 01/26/2024    trigger release right thumb      No family history on file.   Social History:   Social History     Socioeconomic History    Marital status:    Tobacco Use    Smoking status: Never    Smokeless tobacco: Never   Vaping Use    Vaping status: Never Used   Substance and Sexual Activity    Alcohol use: Never    Drug use: Never   Other Topics Concern    Caffeine Concern No    Exercise Yes   Social History Narrative    The patient does not use an assistive device..      The patient does live in a home with stairs.     Social Determinants of Health      [facility], [facility]    Social Connections    Received from [facility], [facility]    Housing Stability        Lela Cannon's SCREENING SCHEDULE   Tests on this list are recommended by your physician but may not be covered, or covered at this frequency, by your insurer. Please check with your insurance carrier before scheduling to verify coverage.   PREVENTATIVE SERVICES  INDICATIONS AND SCHEDULE Internal Lab or Procedure External Lab or Procedure   Diabetes Screening      HbgA1C   Annually No results found for: \"A1C\"      No data to display                Fasting Blood Sugar (FSB)Annually GLUCOSE (mg/dL)   Date Value   05/30/2023 73         No data to display               Cardiovascular Disease Screening     LDL Annually LDL-CHOLESTEROL (mg/dL (calc))   Date Value   05/30/2023  105 (H)        EKG One Time      Colorectal Cancer Screening      Colonoscopy Screen every 10 years Health Maintenance   Topic Date Due    Colorectal Cancer Screening  11/11/2025    Update Health Maintenance if applicable    Flex Sigmoidoscopy Screen every 5 years No results found for this or any previous visit.      No data to display                 Fecal Occult Blood Annually No results found for: \"FOB\", \"OCCULTSTOOL\"      No data to display                Glaucoma Screening      Ophthalmology Visit Annually       Bone Density Screening      Dexascan Every two years Last Dexa Scan:    XR DEXA BONE DENSITOMETRY (CPT=77080) 01/18/2022        No data to display               Pap and Pelvic      Pap: Every 3 yrs age 21-65 or Pap+HPV every 5 yrs age 30-65, age 65 and older at high risk   No recommendations at this time Update Health Maintenance if applicable    Chlamydia  Annually if high risk No results found for: \"CHLAMYDIA\"      No data to display                Screening Mammogram      Mammogram  Annually Health Maintenance   Topic Date Due    Mammogram  10/25/2024    Update Health Maintenance if applicable   Immunizations      Influenza No orders found for this or any previous visit. Update Immunization Activity if applicable    Pneumococcal No orders found for this or any previous visit. Update Immunization Activity if applicable    Hepatitis B No orders found for this or any previous visit. Update Immunization Activity if applicable    Tetanus No orders found for this or any previous visit. Update Immunization Activity if applicable    Zoster (Not covered by Medicare Part B) No orders found for this or any previous visit. Update Immunization Activity if applicable     SPECIFIC DISEASE MONITORING Internal Lab or Procedure External Lab or Procedure   Annual Monitoring of Persistent     Medications (ACE/ARB, digoxin, diuretics)    Potassium  Annually POTASSIUM (mmol/L)   Date Value   05/30/2023 3.7         No data  to display                Creatinine  Annually CREATININE (mg/dL)   Date Value   05/30/2023 0.79         No data to display                Digoxin Serum Conc  Annually No results found for: \"DIGOXIN\"      No data to display                Diabetes      HgbA1C  Annually No results found for: \"A1C\"      No data to display                Creat/alb ratio  Annually      LDL  Annually LDL-CHOLESTEROL (mg/dL (calc))   Date Value   05/30/2023 105 (H)         No data to display                 Dilated Eye exam  Annually      No data to display                   No data to display                COPD      Spirometry Testing Annually No results found for this or any previous visit.      No data to display                      General Health     In the past six months, have you lost more than 10 pounds without trying?: 2 - No    Has your appetite been poor?: No    Type of Diet: Vegetarian    How does the patient maintain a good energy level?: Daily Walks    How would you describe your daily physical activity?: Moderate    How would you describe your current health state?: Good    How do you maintain positive mental well-being?: Social Interaction;Games;Visiting Friends;Visiting Family         Have you had any immunizations at another office such as Influenza, Hepatitis B, Tetanus, or Pneumococcal?: No     Functional Ability     Bathing or Showering: Able without help    Toileting: Able without help    Dressing: Able without help    Eating: Able without help    Driving: Able without help    Preparing your meals: Able without help    Managing money/bills: Able without help    Taking medications as prescribed: Able without help    Are you able to afford your medications?: Yes    Hearing Problems?: No     Functional Status     Hearing Problems?: No    Vision Problems? : Yes    Difficulty walking?: No    Difficulty dressing or bathing?: No    Problems with daily activities? : No    Memory Problems?: No      Fall/Risk Assessment                                                               Depression Screening (PHQ-2/PHQ-9): Over the LAST 2 WEEKS                      Advance Directives     Do you have a healthcare power of ?: Yes    Do you have a living will?: Yes     Hearing Assessment (Required for AWV/SWV)      Hearing Screening    Screening Method: Questionnaire  I have a problem hearing over the telephone: No I have trouble following the conversations when two or more people are talking at the same time: No   I have trouble understanding things on the TV: No I have to strain to understand conversations: No   I have to worry about missing the telephone ring or doorbell: No I have trouble hearing conversations in a noisy background such as a crowded room or restaurant: No   I get confused about where sounds come from: No I misunderstand some words in a sentence and need to ask people to repeat themselves: No   I especially have trouble understanding the speech of women and children: No I have trouble understanding the speaker in a large room such as at a meeting or place of Sikh: No   Many people I talk to seem to mumble (or don't speak clearly): No People get annoyed because I misunderstand what they say: No   I misunderstand what others are saying and make inappropriate responses: No I avoid social activities because I cannot hear well and fear I will reply improperly: No   Family members and friends have told me they think I may have hearing loss: No             Visual Acuity     Right Eye Visual Acuity: Corrected Left Eye Visual Acuity: Corrected   Right Eye Chart Acuity: 20/30 Left Eye Chart Acuity: 20/40     Cognitive Assessment                    Recall \"Ball\": Correct    Recall \"Flag\": Correct    Recall \"Tree\": Correct          Objective:   Vitals:    06/21/24 1203   BP: 140/82   Pulse:    Resp:    Temp:        Physical Exam  Constitutional:       Appearance: Normal appearance.   HENT:      Head: Normocephalic and  atraumatic.      Right Ear: Tympanic membrane normal. There is impacted cerumen.      Left Ear: Tympanic membrane normal. There is impacted cerumen.      Nose: Congestion present.      Mouth/Throat:      Mouth: Mucous membranes are moist.   Cardiovascular:      Rate and Rhythm: Normal rate and regular rhythm.      Heart sounds:      No gallop.   Pulmonary:      Effort: Pulmonary effort is normal.      Breath sounds: Normal breath sounds.   Abdominal:      General: Bowel sounds are normal.      Palpations: Abdomen is soft. There is no mass.   Neurological:      General: No focal deficit present.      Mental Status: She is alert and oriented to person, place, and time.   Psychiatric:         Mood and Affect: Mood normal.         Assessment & Plan:   1. Medicare annual wellness visit, subsequent  Survey completed and the following labs have been ordered.  - CBC W Differential W Platelet [E]  - TSH [E]  - Lipid Panel [E]  - Comp Metabolic Panel (14) [E]  - Urinalysis, Routine [E]    2. Essential hypertension  Ordered.  - Zinc, Plasma Or Serum [E]; Future  - Zinc, Plasma Or Serum [E]    3. Colon cancer screening  Referred.  - Gastro Referral - Joni ([place])    4. Nasal sinus congestion  Currently asymptomatic.    5. Itching of ear  Secondary to #6.    6. Bilateral impacted cerumen  Bilateral EAC irrigated to clear.  - Removal Impacted Cerumen Using Irrigation/Lavage, Bilateral    7. Vitamin D insufficiency  Status update.  - Vitamin D [E]; Future  - Vitamin D [E]    8. Vitamin B12 deficiency  Status update.  - Vitamin B12 [E]; Future  - Vitamin B12 [E]      Orders Placed This Encounter   Procedures    CBC W Differential W Platelet [E]    TSH [E]    Lipid Panel [E]    Comp Metabolic Panel (14) [E]    Zinc, Plasma Or Serum [E]    Vitamin D [E]    Vitamin B12 [E]    Vitamin D, 25-Hydroxy    Urinalysis, Routine [E]    Removal Impacted Cerumen Using Irrigation/Lavage, Bilateral       Meds This Visit:  Requested  Prescriptions      No prescriptions requested or ordered in this encounter       Imaging & Referrals:  GASTRO - INTERNAL     Patient Instructions   All adult screening ordered and done appropriate for patient's age and gender and risk factors and complaints.  Recommend weight loss via daily exercising and consistent healthy dietary changes.  Medication reviewed and renewed where needed and appropriate.  Comply with medications.  Monitor blood pressures and record at home. Limit salt intake.  To GI.      Return in about 1 year (around 6/21/2025), or if symptoms worsen or fail to improve.

## 2024-06-25 LAB — ZINC: 61 UG/DL

## 2024-07-01 ENCOUNTER — NURSE TRIAGE (OUTPATIENT)
Dept: FAMILY MEDICINE CLINIC | Facility: CLINIC | Age: 69
End: 2024-07-01

## 2024-07-01 ENCOUNTER — TELEPHONE (OUTPATIENT)
Dept: FAMILY MEDICINE CLINIC | Facility: CLINIC | Age: 69
End: 2024-07-01

## 2024-07-01 NOTE — TELEPHONE ENCOUNTER
Patient was notified of lab results and recommendations.  States not all labs were drawn, will come in to the office for [facility] lab hours provided.  Patient verbalized understanding.

## 2024-07-01 NOTE — TELEPHONE ENCOUNTER
Called patient, no answer.     Left message that this RN is not sure why the rest of the labs ordered on 6/21 were not drawn and processed and apologized for the inconvenience.  Advised that we can draw [facility] labs here. Provided walk in hours.  Advised to call back with any other questions or concerns.

## 2024-07-01 NOTE — TELEPHONE ENCOUNTER
Patient is calling to clarify why the labs were not completed at office visit with PCP on 6/21/24.   Patient was not advised to schedule an appointment with [facility] for remaining labs.  Patient is requesting clarification.   Please advise.

## 2024-07-01 NOTE — TELEPHONE ENCOUNTER
Action Requested: Summary for Provider     []  Critical Lab, Recommendations Needed  [] Need Additional Advice  []   FYI    []   Need Orders  [] Need Medications Sent to Pharmacy  []  Other     SUMMARY: Per Protocol disposition advised to be seen for skin issue on her back. Patient states that she had the skin bump removed last year and it has returned, denies pain, redness or swelling but wanted to get it removed again. Patient c/o itching. Offered several appt options and urgent care but patient states that she will schedule the appt for next week around her husbands appts.    Future Appointments   Date Time Provider Department Center   7/11/2024  4:40 PM Angelique Vee MD [facility]   8/20/2024  1:30 PM Roberta Irizarry MD [facility]   6/24/2025 11:00 AM Evelio Patterson DO [facility]     Reason for call: Skin Problem  Onset: yesterday    Reason for Disposition   Patient wants to be seen    Protocols used: Skin Lump or Localized Swelling-A-OH

## 2024-07-02 ENCOUNTER — LAB ENCOUNTER (OUTPATIENT)
Dept: LAB | Age: 69
End: 2024-07-02
Attending: FAMILY MEDICINE
Payer: MEDICARE

## 2024-07-02 ENCOUNTER — TELEPHONE (OUTPATIENT)
Dept: FAMILY MEDICINE CLINIC | Facility: CLINIC | Age: 69
End: 2024-07-02

## 2024-07-02 LAB
ALBUMIN SERPL-MCNC: 4.3 G/DL (ref 3.2–4.8)
ALBUMIN/GLOB SERPL: 1.5 {RATIO} (ref 1–2)
ALP LIVER SERPL-CCNC: 93 U/L
ALT SERPL-CCNC: 19 U/L
ANION GAP SERPL CALC-SCNC: 7 MMOL/L (ref 0–18)
AST SERPL-CCNC: 29 U/L (ref ?–34)
BASOPHILS # BLD AUTO: 0.04 X10(3) UL (ref 0–0.2)
BASOPHILS NFR BLD AUTO: 0.8 %
BILIRUB SERPL-MCNC: 0.6 MG/DL (ref 0.2–1.1)
BUN BLD-MCNC: 8 MG/DL (ref 9–23)
BUN/CREAT SERPL: 10.1 (ref 10–20)
CALCIUM BLD-MCNC: 9.2 MG/DL (ref 8.7–10.4)
CHLORIDE SERPL-SCNC: 108 MMOL/L (ref 98–112)
CHOLEST SERPL-MCNC: 213 MG/DL (ref ?–200)
CO2 SERPL-SCNC: 30 MMOL/L (ref 21–32)
CREAT BLD-MCNC: 0.79 MG/DL
DEPRECATED RDW RBC AUTO: 46.3 FL (ref 35.1–46.3)
EGFRCR SERPLBLD CKD-EPI 2021: 80 ML/MIN/1.73M2 (ref 60–?)
EOSINOPHIL # BLD AUTO: 0.07 X10(3) UL (ref 0–0.7)
EOSINOPHIL NFR BLD AUTO: 1.5 %
ERYTHROCYTE [DISTWIDTH] IN BLOOD BY AUTOMATED COUNT: 14.6 % (ref 11–15)
FASTING PATIENT LIPID ANSWER: YES
FASTING STATUS PATIENT QL REPORTED: YES
GLOBULIN PLAS-MCNC: 2.9 G/DL (ref 2–3.5)
GLUCOSE BLD-MCNC: 79 MG/DL (ref 70–99)
HCT VFR BLD AUTO: 39.6 %
HDLC SERPL-MCNC: 66 MG/DL (ref 40–59)
HGB BLD-MCNC: 13.6 G/DL
IMM GRANULOCYTES # BLD AUTO: 0.01 X10(3) UL (ref 0–1)
IMM GRANULOCYTES NFR BLD: 0.2 %
LDLC SERPL CALC-MCNC: 138 MG/DL (ref ?–100)
LYMPHOCYTES # BLD AUTO: 1.15 X10(3) UL (ref 1–4)
LYMPHOCYTES NFR BLD AUTO: 24 %
MCH RBC QN AUTO: 29.8 PG (ref 26–34)
MCHC RBC AUTO-ENTMCNC: 34.3 G/DL (ref 31–37)
MCV RBC AUTO: 86.7 FL
MONOCYTES # BLD AUTO: 0.39 X10(3) UL (ref 0.1–1)
MONOCYTES NFR BLD AUTO: 8.1 %
NEUTROPHILS # BLD AUTO: 3.13 X10 (3) UL (ref 1.5–7.7)
NEUTROPHILS # BLD AUTO: 3.13 X10(3) UL (ref 1.5–7.7)
NEUTROPHILS NFR BLD AUTO: 65.4 %
NONHDLC SERPL-MCNC: 147 MG/DL (ref ?–130)
OSMOLALITY SERPL CALC.SUM OF ELEC: 297 MOSM/KG (ref 275–295)
PLATELET # BLD AUTO: 156 10(3)UL (ref 150–450)
POTASSIUM SERPL-SCNC: 3.7 MMOL/L (ref 3.5–5.1)
PROT SERPL-MCNC: 7.2 G/DL (ref 5.7–8.2)
RBC # BLD AUTO: 4.57 X10(6)UL
SODIUM SERPL-SCNC: 145 MMOL/L (ref 136–145)
TRIGL SERPL-MCNC: 52 MG/DL (ref 30–149)
TSI SER-ACNC: 3.19 MIU/ML (ref 0.55–4.78)
VLDLC SERPL CALC-MCNC: 9 MG/DL (ref 0–30)
WBC # BLD AUTO: 4.8 X10(3) UL (ref 4–11)

## 2024-07-02 PROCEDURE — 36415 COLL VENOUS BLD VENIPUNCTURE: CPT | Performed by: FAMILY MEDICINE

## 2024-07-02 PROCEDURE — 84443 ASSAY THYROID STIM HORMONE: CPT | Performed by: FAMILY MEDICINE

## 2024-07-02 PROCEDURE — 80061 LIPID PANEL: CPT | Performed by: FAMILY MEDICINE

## 2024-07-02 PROCEDURE — 85025 COMPLETE CBC W/AUTO DIFF WBC: CPT | Performed by: FAMILY MEDICINE

## 2024-07-02 PROCEDURE — 80053 COMPREHEN METABOLIC PANEL: CPT | Performed by: FAMILY MEDICINE

## 2024-07-11 ENCOUNTER — OFFICE VISIT (OUTPATIENT)
Dept: FAMILY MEDICINE CLINIC | Facility: CLINIC | Age: 69
End: 2024-07-11
Payer: MEDICARE

## 2024-07-11 VITALS
BODY MASS INDEX: 30 KG/M2 | HEART RATE: 80 BPM | DIASTOLIC BLOOD PRESSURE: 77 MMHG | SYSTOLIC BLOOD PRESSURE: 139 MMHG | WEIGHT: 167 LBS

## 2024-07-11 DIAGNOSIS — L72.3 SEBACEOUS CYST: Primary | ICD-10-CM

## 2024-07-11 PROCEDURE — 3078F DIAST BP <80 MM HG: CPT | Performed by: FAMILY MEDICINE

## 2024-07-11 PROCEDURE — 1159F MED LIST DOCD IN RCRD: CPT | Performed by: FAMILY MEDICINE

## 2024-07-11 PROCEDURE — 3075F SYST BP GE 130 - 139MM HG: CPT | Performed by: FAMILY MEDICINE

## 2024-07-11 PROCEDURE — 99213 OFFICE O/P EST LOW 20 MIN: CPT | Performed by: FAMILY MEDICINE

## 2024-07-11 PROCEDURE — 1160F RVW MEDS BY RX/DR IN RCRD: CPT | Performed by: FAMILY MEDICINE

## 2024-07-11 NOTE — PROGRESS NOTES
Lela Cannon is a 69 year old female.  Chief Complaint   Patient presents with    Bump     Bump on back       HPI:   Patient is a 69-year-old female presents today with a sebaceous cyst on her back and 1 on her chest.    Current Outpatient Medications   Medication Sig Dispense Refill    amLODIPine 5 MG Oral Tab Take 1 tablet (5 mg total) by mouth daily. 15 tablet 0    ammonium lactate (AMLACTIN DAILY) 12 % External Lotion Apply 1 Application topically as needed for Dry Skin. 225 g 3    vitamin B-12 50 MCG Oral Tab Take 1 tablet (50 mcg total) by mouth daily.      Cholecalciferol (VITAMIN D) 50 MCG (2000 UT) Oral Cap Take by mouth.      vitamin E 100 UNITS Oral Cap Take 1 capsule (100 Units total) by mouth daily.      Vitamin C 500 MG Oral Tab Take 1 tablet (500 mg total) by mouth daily.      omega-3 Oral Liquid Take by mouth daily.      Zinc 30 MG Oral Cap Take by mouth.      Magnesium 200 MG Oral Tab Take by mouth.      HYDROcodone-acetaminophen 7.5-325 MG Oral Tab Take 1 tablet by mouth every 4 to 6 hours as needed for Pain. 10 tablet 0    GARLIC OR Take 1 tablet by mouth daily.      Misc Natural Products (GLUCOSAMINE CHONDROITIN ADV OR) Take 1 tablet by mouth daily.      Coenzyme Q10 (CO Q-10 OR) Take 1 tablet by mouth daily.      Lactobacillus (PROBIATA OR) Take by mouth.        Past Medical History:    Back problem    Chronic back pain    Essential hypertension    High blood pressure    Trigger thumb, right thumb    Tubular adenoma of colon    repeat CLN in 2025    Visual impairment      Past Surgical History:   Procedure Laterality Date    Colonoscopy N/A 11/11/2020    Procedure: COLONOSCOPY;  Surgeon: LUZ ELENA Reyes MD;  Location: [facility] ENDOSCOPY    Hysterectomy      Incise finger tendon sheath Right 01/26/2024    trigger release right thumb      Social History:  Social History     Socioeconomic History    Marital status:    Tobacco Use    Smoking status: Never    Smokeless tobacco: Never    Vaping Use    Vaping status: Never Used   Substance and Sexual Activity    Alcohol use: Never    Drug use: Never   Other Topics Concern    Caffeine Concern No    Exercise Yes   Social History Narrative    The patient does not use an assistive device..      The patient does live in a home with stairs.     Social Determinants of Health      [facility], [facility]    Social Connections    Received from [facility], [facility]    Housing Stability        REVIEW OF SYSTEMS:   GENERAL HEALTH: No fevers, chills, sweats, fatigue  VISION: No recent vision problems, blurry vision or double vision  HEENT: No decreased hearing ear pain nasal congestion or sore throat  SKIN: denies any unusual skin lesions or rashes  RESPIRATORY: denies shortness of breath, cough, wheezing  CARDIOVASCULAR: denies chest pain on exertion, palpitations, swelling in feet  GI: denies abdominal pain and denies heartburn, nausea or vomiting  : No Pain on urination, change in the color of urine, discharge, urinating frequently  MUS: No back pain, joint pain, muscle pain  NEURO: denies headaches , anxiety, depression    EXAM:   /77 (BP Location: Left arm, Patient Position: Sitting, Cuff Size: adult)   Pulse 80   Wt 167 lb (75.8 kg)   BMI 29.58 kg/m²   GENERAL: well developed, well nourished,in no apparent distress    Skin: Small sebaceous cyst mid chest small sebaceous cyst left shoulder    ASSESSMENT AND PLAN:   1. Sebaceous cyst  Patient is a 69-year-old female with a sebaceous cyst on her chest to monitor back not infected gave her options she will think about having them removed by general surgery  - SURGERY - INTERNAL       The patient indicates understanding of these issues and agrees to the plan.  No follow-ups on file.

## 2024-07-19 ENCOUNTER — TELEPHONE (OUTPATIENT)
Dept: SURGERY | Facility: CLINIC | Age: 69
End: 2024-07-19

## 2024-07-19 NOTE — TELEPHONE ENCOUNTER
7-.   5:00 on Friday 7-.  Patient had death in the family and elects to cancel.  She is going out of town and will call when she is ready to reschedule.  Patient has back cyst and cyst between the breasts.    Patient will call when she is ready to reschedule.    Renee

## 2024-07-23 ENCOUNTER — TELEPHONE (OUTPATIENT)
Dept: SURGERY | Facility: CLINIC | Age: 69
End: 2024-07-23

## 2024-07-25 NOTE — TELEPHONE ENCOUNTER
Phone call to the patient, 7-.  Contact made.  She would like to reschedule office visit possible office procedure.  I passed message to Yue to MACK bui at the patients convenience.  Patient expressed understanding.    Renee

## 2024-08-01 ENCOUNTER — NURSE TRIAGE (OUTPATIENT)
Dept: FAMILY MEDICINE CLINIC | Facility: CLINIC | Age: 69
End: 2024-08-01

## 2024-08-01 ENCOUNTER — PATIENT MESSAGE (OUTPATIENT)
Dept: FAMILY MEDICINE CLINIC | Facility: CLINIC | Age: 69
End: 2024-08-01

## 2024-08-01 DIAGNOSIS — H92.01 ACUTE EAR PAIN, RIGHT: Primary | ICD-10-CM

## 2024-08-01 DIAGNOSIS — M79.671 RIGHT FOOT PAIN: Primary | ICD-10-CM

## 2024-08-01 NOTE — TELEPHONE ENCOUNTER
From: Lela Cannon  To: Evelio Patterson  Sent: 8/1/2024 12:26 PM CDT  Subject: Need Referral for foot doctor     Having pain in right foot from callous on heel and plantar fasciitis on right foot, please send referral for me to visit foot doctor  Radha Baxter  On September 19, 2024.

## 2024-08-01 NOTE — TELEPHONE ENCOUNTER
Action Requested: Summary for Provider     []  Critical Lab, Recommendations Needed  [] Need Additional Advice  []   FYI    []   Need Orders  [] Need Medications Sent to Pharmacy  []  Other     SUMMARY: Patient has ENT appointment for 8/6/24 and will keep that appointment, patient needed a referral for Dr. Walden. Referral placed and is authorized.     RN recommended monitoring for fever, proceed to immediate care or emergency if fever develops to have ear examined right away.     Patient verbalized understanding and agreement with this plan.     Reason for call: Referral (Acute right ear pain)  Onset: Jul 29, 2024    Reason for Disposition   Mild earache and ear congestion (fullness) occurring during air travel    Answer Assessment - Initial Assessment Questions  1. LOCATION: \"Which ear is involved?\"        Right ear   2. SENSATION: \"Describe how the ear feels.\" (e.g. stuffy, full, plugged).\"       When patient blows her nose she feels a sensation like there is fluid   3. ONSET:  \"When did the ear symptoms start?\"        Monday 7/29 when patient got off a plane  4. PAIN: \"Do you also have an earache?\" If Yes, ask: \"How bad is it?\" (Scale 1-10; or mild, moderate, severe)      Moderate to severe, today \"8/10\" has had some times where it was \"10/10\"  5. CAUSE: \"What do you think is causing the ear congestion?\"      This happens to patient's right ear every time she flies on an airplane  6. URI: \"Do you have a runny nose or cough?\"       No  7. NASAL ALLERGIES: \"Are there symptoms of hay fever, such as sneezing or a clear nasal discharge?\"      No  8. PREGNANCY: \"Is there any chance you are pregnant?\" \"When was your last menstrual period?\"      N/A patient age is 69 years    Answer Assessment - Initial Assessment Questions  1. LOCATION: \"Which ear is involved?\"      Right   2. ONSET: \"When did the ear start hurting\"       Monday 7/29/24  3. SEVERITY: \"How bad is the pain?\"  (Scale 1-10; mild, moderate or severe)    -  MILD (1-3): doesn't interfere with normal activities     - MODERATE (4-7): interferes with normal activities or awakens from sleep     - SEVERE (8-10): excruciating pain, unable to do any normal activities       Severe today pain is \"8/10\" has had some times when it was \"10/10\"   4. URI SYMPTOMS: \"Do you have a runny nose or cough?\"      No   5. FEVER: \"Do you have a fever?\" If Yes, ask: \"What is your temperature, how was it measured, and when did it start?\"      Patient denies   6. CAUSE: \"Have you been swimming recently?\", \"How often do you use Q-TIPS?\", \"Have you had any recent air travel or scuba diving?\"      Air travel, returned Monday - this always happens to the patient's right ear when she flies   7. OTHER SYMPTOMS: \"Do you have any other symptoms?\" (e.g., headache, stiff neck, dizziness, vomiting, runny nose, decreased hearing)      Patient denies   8. PREGNANCY: \"Is there any chance you are pregnant?\" \"When was your last menstrual period?\"      N/A    Protocols used: Ear - Congestion-A-OH, Earache-A-OH    Patient stated she does not mind waiting for the ENT appointment on 8/6/24, she gets these symptoms every time she flies, always the right ear.     Patient's pain travels from right ear down the right side of her neck, she has been treating at home with pain medication, warm compresses, nasal spray.     RN educated the patient about risk for inner ear infection to develop, she will monitor temperature and if fever, condition worsens, will proceed to immediate care or emergency room.

## 2024-08-06 ENCOUNTER — TELEPHONE (OUTPATIENT)
Facility: CLINIC | Age: 69
End: 2024-08-06

## 2024-08-06 ENCOUNTER — TELEPHONE (OUTPATIENT)
Dept: FAMILY MEDICINE CLINIC | Facility: CLINIC | Age: 69
End: 2024-08-06

## 2024-08-06 ENCOUNTER — OFFICE VISIT (OUTPATIENT)
Dept: GASTROENTEROLOGY | Facility: CLINIC | Age: 69
End: 2024-08-06
Payer: MEDICARE

## 2024-08-06 ENCOUNTER — OFFICE VISIT (OUTPATIENT)
Dept: OTOLARYNGOLOGY | Facility: CLINIC | Age: 69
End: 2024-08-06
Payer: MEDICARE

## 2024-08-06 VITALS — WEIGHT: 165 LBS | BODY MASS INDEX: 28.87 KG/M2 | HEIGHT: 63.5 IN

## 2024-08-06 VITALS — BODY MASS INDEX: 28.35 KG/M2 | WEIGHT: 162 LBS | HEIGHT: 63.5 IN

## 2024-08-06 DIAGNOSIS — Z86.010 HISTORY OF ADENOMATOUS POLYP OF COLON: ICD-10-CM

## 2024-08-06 DIAGNOSIS — H92.01 RIGHT EAR PAIN: ICD-10-CM

## 2024-08-06 DIAGNOSIS — K62.5 RECTAL BLEEDING: ICD-10-CM

## 2024-08-06 DIAGNOSIS — Z86.010 HISTORY OF ADENOMATOUS POLYP OF COLON: Primary | ICD-10-CM

## 2024-08-06 DIAGNOSIS — H92.01 EAR PAIN, RIGHT: Primary | ICD-10-CM

## 2024-08-06 DIAGNOSIS — Z12.11 COLON CANCER SCREENING: Primary | ICD-10-CM

## 2024-08-06 DIAGNOSIS — Z12.11 COLON CANCER SCREENING: ICD-10-CM

## 2024-08-06 DIAGNOSIS — K59.00 CONSTIPATION, UNSPECIFIED CONSTIPATION TYPE: ICD-10-CM

## 2024-08-06 DIAGNOSIS — K64.9 HEMORRHOIDS, UNSPECIFIED HEMORRHOID TYPE: ICD-10-CM

## 2024-08-06 DIAGNOSIS — Z86.0101 HISTORY OF ADENOMATOUS POLYP OF COLON: ICD-10-CM

## 2024-08-06 PROCEDURE — 1125F AMNT PAIN NOTED PAIN PRSNT: CPT | Performed by: INTERNAL MEDICINE

## 2024-08-06 PROCEDURE — 1159F MED LIST DOCD IN RCRD: CPT | Performed by: INTERNAL MEDICINE

## 2024-08-06 PROCEDURE — 3008F BODY MASS INDEX DOCD: CPT | Performed by: OTOLARYNGOLOGY

## 2024-08-06 PROCEDURE — 99204 OFFICE O/P NEW MOD 45 MIN: CPT | Performed by: INTERNAL MEDICINE

## 2024-08-06 PROCEDURE — 1160F RVW MEDS BY RX/DR IN RCRD: CPT | Performed by: OTOLARYNGOLOGY

## 2024-08-06 PROCEDURE — 1159F MED LIST DOCD IN RCRD: CPT | Performed by: OTOLARYNGOLOGY

## 2024-08-06 PROCEDURE — 3008F BODY MASS INDEX DOCD: CPT | Performed by: INTERNAL MEDICINE

## 2024-08-06 PROCEDURE — 99213 OFFICE O/P EST LOW 20 MIN: CPT | Performed by: OTOLARYNGOLOGY

## 2024-08-06 NOTE — TELEPHONE ENCOUNTER
Patient is scheduled for a procedure on 8/15/24 with Dr. Red. Pended referral. Please review diagnosis and sign off if you agree.    Thank you,  [facility] [phone]

## 2024-08-06 NOTE — PROGRESS NOTES
Lela Cannon is a 69 year old female.    Chief Complaint   Patient presents with    Ear Problem     Patient Presents with: Right ear pain started during airplane, feels water inside ear per pt right sinus feels pressure, pain is radiating down my neck        HISTORY OF PRESENT ILLNESS    Presents with 2-day history of very sharp pain in her ear.  Had it once before several months ago and this resolved after being on eardrops given to her through an ED for presumed otitis externa.  Was seen by her primary care physician today had her ears irrigated and noted no infection was sent to me for further evaluation and treatment.  She does grind her teeth but has never worn a bite guard.  Chews only on the left side of her mouth when she chews hard foods having some neck pain behind her ear on the right as well.  No fevers no other signs or symptoms suggestive of infection.  Ear canals are very itchy all the time.     4/27/23 last visit started on cyclobenzaprine and meloxicam with resolution of her ear pain.  Using Cortaid for itchiness and states that she has to use that once a day to help with itchiness which is worse in the mornings.  Seen by her dentist and found to have TMJ issues and grinding behavior.  Now using OTC guard until she gets a more formal guard which will occur after she has some dental work and a partial denture created.     8/6/24 recently went on a flight and noted that she had extreme pain during Ascent and descent.  She was chewing a lot of gum during the flight and and also wore earplugs and earmuffs without any improvement or symptoms continue to have ear pain which is terrible as well as pain along the front of her face in front of the ear down to the jaw and into the neck bilaterally.  Does wear a bite guard every night but still awakens with pain.  No other signs, symptoms or complaints.  I have seen her in the past and suspected her to have TMJ issues.    Social History      Socioeconomic History    Marital status:    Tobacco Use    Smoking status: Never    Smokeless tobacco: Never   Vaping Use    Vaping status: Never Used   Substance and Sexual Activity    Alcohol use: Never    Drug use: Never   Other Topics Concern    Caffeine Concern No    Exercise Yes       History reviewed. No pertinent family history.    Past Medical History:    Back problem    Chronic back pain    Essential hypertension    High blood pressure    Trigger thumb, right thumb    Tubular adenoma of colon    repeat CLN in 2025    Visual impairment       Past Surgical History:   Procedure Laterality Date    Colonoscopy N/A 11/11/2020    Procedure: COLONOSCOPY;  Surgeon: LUZ ELENA Reyes MD;  Location: [facility] ENDOSCOPY    Hysterectomy      Incise finger tendon sheath Right 01/26/2024    trigger release right thumb         REVIEW OF SYSTEMS    System Neg/Pos Details   Constitutional Negative Fatigue, fever and weight loss.   ENMT Negative Drooling.   Eyes Negative Blurred vision and vision changes.   Respiratory Negative Dyspnea and wheezing.   Cardio Negative Chest pain, irregular heartbeat/palpitations and syncope.   GI Negative Abdominal pain and diarrhea.   Endocrine Negative Cold intolerance and heat intolerance.   Neuro Negative Tremors.   Psych Negative Anxiety and depression.   Integumentary Negative Frequent skin infections, pigment change and rash.   Hema/Lymph Negative Easy bleeding and easy bruising.           PHYSICAL EXAM    Ht 5' 3.5\" (1.613 m)   Wt 162 lb (73.5 kg)   BMI 28.25 kg/m²        Constitutional Normal Overall appearance - Normal.   Psychiatric Normal Orientation - Oriented to time, place, person & situation. Appropriate mood and affect.   Neck Exam Normal Inspection - Normal. Palpation - Normal. Parotid gland - Normal. Thyroid gland - Normal.   Eyes Normal Conjunctiva - Right: Normal, Left: Normal. Pupil - Right: Normal, Left: Normal. Fundus - Right: Normal, Left: Normal.   Neurological  Normal Memory - Normal. Cranial nerves - Cranial nerves II through XII grossly intact.   Head/Face Normal Facial features - Normal. Eyebrows - Normal. Skull - Normal.        Nasopharynx Normal External nose - Normal. Lips/teeth/gums - Normal. Tonsils - Normal. Oropharynx - Normal.   Ears Normal Inspection - Right: Normal, Left: Normal. Canal - Right: Normal, Left: Normal. TM - Right: Normal, Left: Normal.   Skin Normal Inspection - Normal.        Lymph Detail Normal Submental. Submandibular. Anterior cervical. Posterior cervical. Supraclavicular.   TMJ  Very tender to palpation bilaterally   Nose/Mouth/Throat Normal External nose - Normal. Lips/teeth/gums - Normal. Tonsils - Normal. Oropharynx - Normal.   Nose/Mouth/Throat Normal Nares - Right: Normal Left: Normal. Septum -Normal  Turbinates - Right: Normal, Left: Normal.       Current Outpatient Medications:     amLODIPine 5 MG Oral Tab, Take 1 tablet (5 mg total) by mouth daily., Disp: 15 tablet, Rfl: 0    vitamin B-12 50 MCG Oral Tab, Take 1 tablet (50 mcg total) by mouth daily., Disp: , Rfl:     Cholecalciferol (VITAMIN D) 50 MCG (2000 UT) Oral Cap, Take by mouth., Disp: , Rfl:     vitamin E 100 UNITS Oral Cap, Take 1 capsule (100 Units total) by mouth daily., Disp: , Rfl:     Vitamin C 500 MG Oral Tab, Take 1 tablet (500 mg total) by mouth daily., Disp: , Rfl:     omega-3 Oral Liquid, Take by mouth daily., Disp: , Rfl:     Zinc 30 MG Oral Cap, Take by mouth., Disp: , Rfl:     Magnesium 200 MG Oral Tab, Take by mouth., Disp: , Rfl:     GARLIC OR, Take 1 tablet by mouth daily., Disp: , Rfl:     Misc Natural Products (GLUCOSAMINE CHONDROITIN ADV OR), Take 1 tablet by mouth daily., Disp: , Rfl:     Coenzyme Q10 (CO Q-10 OR), Take 1 tablet by mouth daily., Disp: , Rfl:     Lactobacillus (PROBIATA OR), Take by mouth., Disp: , Rfl:     ammonium lactate (AMLACTIN DAILY) 12 % External Lotion, Apply 1 Application topically as needed for Dry Skin., Disp: 225 g, Rfl: 3     HYDROcodone-acetaminophen 7.5-325 MG Oral Tab, Take 1 tablet by mouth every 4 to 6 hours as needed for Pain., Disp: 10 tablet, Rfl: 0  ASSESSMENT AND PLAN    1. Ear pain, right  - Specialty Other Referral - External    2. Right ear pain  She described pain on ascent during her recent plane flight.  Has pain in front behind her ear and into the occipital region as well.  Also has pain in the maxillary areas bilaterally.  All sounds TMJ related.  Grinds her teeth and wears a bite guard wakes up with ear pain bilaterally in the mornings.  I did recommend that she trial some medication which she politely declined.  I did offer physical therapy as well which she is not interested in at the moment.  I did refer her to Dr. Montgomery for evaluation and management of what appears to be a chronic TMJ issue.  Return to see me as needed        This note was prepared using [employer] voice recognition dictation software. As a result errors may occur. When identified these errors have been corrected. While every attempt is made to correct errors during dictation discrepancies may still exist    Tony Walden MD    8/6/2024    11:29 AM

## 2024-08-06 NOTE — TELEPHONE ENCOUNTER
Scheduled for:  Colonoscopy 92823  Provider Name: Dr Red    Date:  Thurs 8/15/2024  Location: [facility]   Sedation: [facility]   Time:  7:45 am, (pt is aware that [place] will call the day before to confirm arrival time)  Prep:  split golytely  Meds/Allergies Reconciled?: reviewed by provider   Diagnosis with codes: colon screening Z12.11, Hx colon polyps Z86.010, rectal bleeding K62.5   Was patient informed to call insurance with codes (Y/N): Yes  Referral sent?:  Yes, [employer] [facility] or [facility] notified?: Electronic case request was sent to [facility] via [employer].    Medication Orders: Patient is aware to NOT take iron pills, herbal meds and diet supplements for 7 days before exam. Also to NOT take any form of alcohol, recreational drugs and any forms of ED meds 24-72 hours before exam.      Misc Orders:       Further instructions given by staff:  Instructions given in office and pt verbalized understanding        Instructions for the procedure  1. Schedule colonoscopy with [facility] [Diagnosis: history of colon adenoma, CRC screening]     2.  bowel prep from pharmacy (split dose Golytely)     3. Medication adjustment:        A. Hold all vitamins and supplements for 1 week     4. Read all bowel prep instructions carefully

## 2024-08-06 NOTE — H&P
[facility] - Gastroenterology                                                                                                               Reason for consult: CRC screening     Requesting physician or provider: Evelio Patterson DO    Chief Complaint   Patient presents with    Colonoscopy Screening       HPI:   Lela Cannon is a 69 year old year-old female with history of HTN, h/o surgical repair of an anal fissure and hemorrhoids 40 years ago here for the following:    Pt developed rectal bleeding last year. It has been intermittent. When it occurs, it is bright red.  She is taking several ointments to try to shrink the stools.  She has had intermittent constipation for over a year and takes 1 scoop of metamucil daily, which helps but she still sometimes notes that she needs to strain to have a BM.  She tried to take 2 scoops of the metamucil in the past, but it cause pain when trying to have a BM.      40 years ago, pt had hemorrhoids and an anal fissure that needed to eventually be addressed surgically.      Pt had a CLN in 2020 and one small adenoma was removed. 5 year recall was recommended.     Patient currently denies any GI symptoms of nausea, vomiting, dyspepsia, dysphagia, hematemesis, abdominal pain, change in bowel habits, thin stools, hematochezia, or melena.  Additionally there is no weight loss and no reported history of chest pain or shortness of breath.    Denies family h/o CRC.     Prior endoscopies:  CLN 11/2020  Impression:   One small colon polyp removed.  Small internal hemorrhoids and mild pan-colonic diverticulosis.    Final Diagnosis:      Ascending colon polyp:  Tubular adenoma.          Soc:  -denies smoking  -denies heavy Etoh  -no illicit drug use    Wt Readings from Last 6 Encounters:   08/06/24 165 lb (74.8 kg)   08/06/24 162 lb (73.5 kg)   07/11/24 167 lb (75.8 kg)   06/21/24  169 lb (76.7 kg)   01/26/24 169 lb (76.7 kg)   08/24/23 177 lb 3.2 oz (80.4 kg)        History, Medications, Allergies, ROS:      Past Medical History:    Back problem    Chronic back pain    Essential hypertension    High blood pressure    Trigger thumb, right thumb    Tubular adenoma of colon    repeat CLN in 2025    Visual impairment      Past Surgical History:   Procedure Laterality Date    Colonoscopy N/A 11/11/2020    Procedure: COLONOSCOPY;  Surgeon: LUZ ELENA Reyes MD;  Location: [facility] ENDOSCOPY    Hysterectomy      Incise finger tendon sheath Right 01/26/2024    trigger release right thumb      Family Hx: History reviewed. No pertinent family history.   Social History:   Social History     Socioeconomic History    Marital status:    Tobacco Use    Smoking status: Never    Smokeless tobacco: Never   Vaping Use    Vaping status: Never Used   Substance and Sexual Activity    Alcohol use: Never    Drug use: Never   Other Topics Concern    Caffeine Concern No    Exercise Yes   Social History Narrative    The patient does not use an assistive device..      The patient does live in a home with stairs.     Social Determinants of Health      [facility]    Social Connections    [facility], [facility]    Housing Stability        Medications (Active prior to today's visit):  Current Outpatient Medications   Medication Sig Dispense Refill    polyethylene glycol, PEG 3350-KCl-NaBcb-NaCl-NaSulf, 236 g Oral Recon Soln Take 4,000 mL by mouth once for 1 dose. As directed by GI clinic instructions. 4000 mL 0    amLODIPine 5 MG Oral Tab Take 1 tablet (5 mg total) by mouth daily. 15 tablet 0    vitamin B-12 50 MCG Oral Tab Take 1 tablet (50 mcg total) by mouth daily.      Cholecalciferol (VITAMIN D) 50 MCG (2000 UT) Oral Cap Take by mouth.      vitamin E 100 UNITS Oral Cap Take 1 capsule (100 Units  total) by mouth daily.      Vitamin C 500 MG Oral Tab Take 1 tablet (500 mg total) by mouth daily.      omega-3 Oral Liquid Take by mouth daily.      Zinc 30 MG Oral Cap Take by mouth.      Magnesium 200 MG Oral Tab Take by mouth.      GARLIC OR Take 1 tablet by mouth daily.      Misc Natural Products (GLUCOSAMINE CHONDROITIN ADV OR) Take 1 tablet by mouth daily.      Coenzyme Q10 (CO Q-10 OR) Take 1 tablet by mouth daily.      Lactobacillus (PROBIATA OR) Take by mouth.      ammonium lactate (AMLACTIN DAILY) 12 % External Lotion Apply 1 Application topically as needed for Dry Skin. 225 g 3    HYDROcodone-acetaminophen 7.5-325 MG Oral Tab Take 1 tablet by mouth every 4 to 6 hours as needed for Pain. 10 tablet 0       Allergies:  Allergies   Allergen Reactions    Lisinopril ANAPHYLAXIS    Penicillins ANAPHYLAXIS       ROS:   CONSTITUTIONAL:  negative for fevers, rigors  EYES:  negative for diplopia   RESPIRATORY:  negative for severe shortness of breath  CARDIOVASCULAR:  negative for crushing sub-sternal chest pain  GASTROINTESTINAL:  see HPI  GENITOURINARY:  negative for dysuria or gross hematuria  INTEGUMENT/BREAST:  SKIN:  negative for jaundice   ALLERGIC/IMMUNOLOGIC:  negative for hay fever  ENDOCRINE:  negative for cold intolerance and heat intolerance  MUSCULOSKELETAL:  negative for joint effusion/severe erythema  BEHAVIOR/PSYCH:  negative for psychotic behavior      PHYSICAL EXAM:   Height 5' 3.5\" (1.613 m), weight 165 lb (74.8 kg).    GEN - Patient appears comfortable and in no acute discomfort  ENT - MMM  EYES - the sclera appears anicteric  CV - no edema  RESP -  No increased work of breathing  ABDOMEN - soft, non-tender exam in all quadrants without rigidity or guarding, non-distended, no abnormal bowel sounds noted, no masses are palpated  RECTAL - external tags noted, ALRIEZA unremarkable besides mild burning noted by the patient. No fissure noted.    SKIN - No jaundice  NEURO - Alert and appropriate, and  gross movements of extremities normal  PSYCH - normal affect, non-agitated    Labs/Imaging:     Patient's pertinent labs and imaging were reviewed and discussed with patient today.      .  ASSESSMENT/PLAN:     69 F with h/o HTN and h/o surgical repair of an anal fissure and hemorrhoids 40 years ago here for the following:    Rectal bleeding - likely hemorrhoidal.    Chronic constipation  H/o surgical repair of an anal fissure and hemorrhoids 40 years ago  Ongoing intermittently for the past year. Constipation has improved with metamucil but still occurs occasionally.  She tried to take 2 scoops of the metamucil in the past, but it cause pain when trying to have a BM.  ALIREZA today with mild burning on exam but no fissure or obviously prolapsing hemorrhoids.  Recommend increasing metamucil slightly, avoiding straining, trying a Squatty Potty, seeing general surgery to discuss options for definitive management of her presumed hemorrhoids, and CLN to r/o alternate etiologies of the rectal bleeding.      H/o colon adenoma, CRC surveillance - Pt had a CLN in 2020 and one small adenoma was removed. 5 year recall was recommended. The pt is asymptomatic. Recommend colonoscopy for surveillance purposes.     Recommend    - CLN with MAC    - CLD the day prior, NPO after midnight, split dose golytely    - Avoid straining    - Avoid spending too much time on the toilet    - Try a Squatty Potty    - Continue metamucil and increase it by 1/2 spoon to keep the stools soft and formed    - See general surgery when able for the hemorrhoids      Colonoscopy consent: I have discussed the risks, benefits, and alternatives to colonoscopy with the patient/primary decision maker [who demonstrated understanding], including but not limited to the risks of bleeding, infection, pain, death, as well as the risks of anesthesia and perforation all leading to prolonged hospitalization, surgical intervention, or even death. I also specifically  mentioned the miss rate of colonoscopy of 5-10% in the best of all circumstances. The patient has agreed to sign an informed consent and elected to proceed with colonoscopy with possible intervention [i.e. polypectomy, stent placement, etc.] as indicated.          Orders This Visit:  No orders of the defined types were placed in this encounter.      Meds This Visit:  Requested Prescriptions     Signed Prescriptions Disp Refills    polyethylene glycol, PEG 3350-KCl-NaBcb-NaCl-NaSulf, 236 g Oral Recon Soln 4000 mL 0     Sig: Take 4,000 mL by mouth once for 1 dose. As directed by GI clinic instructions.       Imaging & Referrals:  SURGERY - INTERNAL         Roberta Irizarry MD          This note was partially prepared using Dragon Medical voice recognition dictation software. As a result, errors may occur. When identified, these errors have been corrected. While every attempt is made to correct errors during dictation, discrepancies may still exist.

## 2024-08-06 NOTE — PATIENT INSTRUCTIONS
PLAN    - Avoid straining    - Avoid spending too much time on the toilet    - Try a Squatty Potty    - Continue metamucil and increase it by 1/2 spoon to keep the stools soft and formed    - See general surgery when able for your hemorrhoids        Instructions for the procedure  1. Schedule colonoscopy with MAC [Diagnosis: history of colon adenoma, CRC screening]    2.  bowel prep from pharmacy (split dose Golytely)    3. Medication adjustment:       A. Hold all vitamins and supplements for 1 week    4. Read all bowel prep instructions carefully    5. AVOID seeds, nuts, popcorn, raw fruits and vegetables (cooked is okay) for 2-3 days before procedure    6. If you start any NEW medication after your visit today, please notify us. Certain medications will need to be held before the procedure, or the procedure cannot be performed.

## 2024-08-13 ENCOUNTER — OFFICE VISIT (OUTPATIENT)
Dept: SURGERY | Facility: CLINIC | Age: 69
End: 2024-08-13
Payer: MEDICARE

## 2024-08-13 VITALS — BODY MASS INDEX: 28.87 KG/M2 | HEIGHT: 63.5 IN | WEIGHT: 165 LBS

## 2024-08-13 DIAGNOSIS — N60.89 SEBACEOUS CYST OF SKIN OF BREAST, UNSPECIFIED LATERALITY: Primary | ICD-10-CM

## 2024-08-13 PROCEDURE — 88304 TISSUE EXAM BY PATHOLOGIST: CPT | Performed by: SURGERY

## 2024-08-13 PROCEDURE — 88305 TISSUE EXAM BY PATHOLOGIST: CPT | Performed by: SURGERY

## 2024-08-13 NOTE — PROCEDURES
General surgery note    Timeout performed and consent signed.  The chest prepped and draped in the usual sterile fashion with Betadine.  1% lidocaine with epinephrine was infiltrated.  1 cm vertical incision is made and the cyst is completely excised.  All hemostasis maintained incision closed using interrupted 4-0 nylon sterile Band-Aid applied.  Next patient placed in the prone position the upper back prepped and draped with Betadine.  1% lidocaine with epinephrine was infiltrated 1 cm incision is made and the cyst is completely excised wound irrigated closed using interrupted 4-0 Prolene.  Sterile Band-Aid applied.  She tolerated this as well.

## 2024-08-13 NOTE — H&P
History and Physical      HPI     Chief Complaint   Patient presents with    Procedure     Excision cyst, back       HPI  Lela Cannon is a 69 year old female who presents with a sebaceous cyst of the intermammary cleft and the upper back.  Each measure 1 cm she desires excision.    Past Medical History:    Back problem    Chronic back pain    Essential hypertension    High blood pressure    Trigger thumb, right thumb    Tubular adenoma of colon    repeat CLN in 2025    Visual impairment     Past Surgical History:   Procedure Laterality Date    Colonoscopy N/A 11/11/2020    Procedure: COLONOSCOPY;  Surgeon: LUZ ELENA Reyes MD;  Location: [facility] ENDOSCOPY    Hysterectomy      Incise finger tendon sheath Right 01/26/2024    trigger release right thumb     Current Outpatient Medications   Medication Sig Dispense Refill    amLODIPine 5 MG Oral Tab Take 1 tablet (5 mg total) by mouth daily. 15 tablet 0    ammonium lactate (AMLACTIN DAILY) 12 % External Lotion Apply 1 Application topically as needed for Dry Skin. 225 g 3    vitamin B-12 50 MCG Oral Tab Take 1 tablet (50 mcg total) by mouth daily.      Cholecalciferol (VITAMIN D) 50 MCG (2000 UT) Oral Cap Take by mouth.      vitamin E 100 UNITS Oral Cap Take 1 capsule (100 Units total) by mouth daily.      Vitamin C 500 MG Oral Tab Take 1 tablet (500 mg total) by mouth daily.      omega-3 Oral Liquid Take by mouth daily.      Zinc 30 MG Oral Cap Take by mouth.      Magnesium 200 MG Oral Tab Take by mouth.      HYDROcodone-acetaminophen 7.5-325 MG Oral Tab Take 1 tablet by mouth every 4 to 6 hours as needed for Pain. 10 tablet 0    GARLIC OR Take 1 tablet by mouth daily.      Misc Natural Products (GLUCOSAMINE CHONDROITIN ADV OR) Take 1 tablet by mouth daily.      Coenzyme Q10 (CO Q-10 OR) Take 1 tablet by mouth daily.      Lactobacillus (PROBIATA OR) Take by mouth.       ALLERGIES  Allergies   Allergen Reactions    Lisinopril ANAPHYLAXIS    Penicillins ANAPHYLAXIS        Social History     Socioeconomic History    Marital status:    Tobacco Use    Smoking status: Never    Smokeless tobacco: Never   Vaping Use    Vaping status: Never Used   Substance and Sexual Activity    Alcohol use: Never    Drug use: Never     History reviewed. No pertinent family history.    Review of Systems   A comprehensive 10 point review of systems was completed.  Pertinent positives and negatives noted in the the HPI.    PHYSICAL EXAM   Ht 5' 3.5\" (1.613 m)   Wt 165 lb (74.8 kg)   BMI 28.77 kg/m²  No LMP recorded. Patient has had a hysterectomy.   Constitutional: appears well hydrated alert and responsive no acute distress noted  Head/Face: normocephalic  Nose/Mouth/Throat: nose and throat are clear palate is intact mucous membranes are moist no oral lesions are noted  Neck/Thyroid: neck is supple without adenopathy  Respiratory: normal to inspection lungs are clear to auscultation bilaterally normal respiratory effort  1 Centimeter sebaceous cyst mid upper back    Cardiovascular: regular rate and rhythm no murmurs, gallups, or rubs  Abdomen: soft non-tender non-distended no organomegaly noted no masses  Extremities: no edema, cyanosis, or clubbing  Neurological: exam appropriate for age reflexes and motor skills appropriate for age    Breast-1 cm intramammary sebaceous cyst  ASSESSMENT/PLAN   Assessment   Encounter Diagnosis   Name Primary?    Sebaceous cyst of skin of breast, unspecified laterality Yes       69 year old female with sebaceous cyst x 2  We have discussed the surgical risks, benefits, alternatives, and expected recovery. We will plan office excision. All of the patient's questions have been answered to her satisfaction.       8/13/2024  Rob Vee MD

## 2024-08-13 NOTE — PATIENT INSTRUCTIONS
Ice pack if needed.  Tylenol or ibuprofen if any discomfort.  Follow-up 1 week for suture removal    Okay to shower tomorrow.  Keep covered with a Band-Aid for 24 hours.

## 2024-08-15 PROBLEM — Z12.11 COLON CANCER SCREENING: Status: ACTIVE | Noted: 2024-08-15

## 2024-08-15 PROCEDURE — 88305 TISSUE EXAM BY PATHOLOGIST: CPT | Performed by: STUDENT IN AN ORGANIZED HEALTH CARE EDUCATION/TRAINING PROGRAM

## 2024-08-21 ENCOUNTER — OFFICE VISIT (OUTPATIENT)
Dept: SURGERY | Facility: CLINIC | Age: 69
End: 2024-08-21

## 2024-08-21 VITALS — WEIGHT: 165 LBS | BODY MASS INDEX: 29 KG/M2

## 2024-08-21 DIAGNOSIS — Z98.890 POST-OPERATIVE STATE: Primary | ICD-10-CM

## 2024-08-21 PROCEDURE — 99024 POSTOP FOLLOW-UP VISIT: CPT | Performed by: SURGERY

## 2024-08-21 PROCEDURE — 1159F MED LIST DOCD IN RCRD: CPT | Performed by: SURGERY

## 2024-08-21 PROCEDURE — 1160F RVW MEDS BY RX/DR IN RCRD: CPT | Performed by: SURGERY

## 2024-08-21 NOTE — PROGRESS NOTES
Postoperative Patient Follow-up      8/21/2024    HPI  Chief Complaint   Patient presents with    Post-Op     Pt here for post op s/p exc. Of lipoma chest and back.  Pt states here for suture removal.  Pt denies all complaints at this time.        Lela Cannon is a 69 year old female postop lipoma excision.      Exam  Wounds are clean dry and intact sutures removed      Assessment/Plan  Assessment   1. Post-operative state        Will follow-up for problems         Rob Vee MD

## 2024-08-22 ENCOUNTER — TELEPHONE (OUTPATIENT)
Facility: CLINIC | Age: 69
End: 2024-08-22

## 2024-08-23 NOTE — TELEPHONE ENCOUNTER
Recall colon in 5 years per DR PECK . Colon done 08/16/2024    Health maintenance updated and message sent to pt outreach to repeat colonoscopy in 5 years

## 2024-08-23 NOTE — TELEPHONE ENCOUNTER
----- Message from Les Red sent at 8/22/2024 12:42 PM CDT -----  1 adenoma removed, 1 hyperplastic polyp removed.     Consider repeat colonoscopy in 5 years.    Should have clinic visit for this.    Mychart sent.    GI Staff:    Can you please place recall for this patient to have a colonoscopy in 5 years.    Thank you.    Les Red MD

## 2024-08-30 ENCOUNTER — TELEPHONE (OUTPATIENT)
Dept: CASE MANAGEMENT | Age: 69
End: 2024-08-30

## 2024-08-30 ENCOUNTER — PATIENT MESSAGE (OUTPATIENT)
Dept: OTOLARYNGOLOGY | Facility: CLINIC | Age: 69
End: 2024-08-30

## 2024-08-30 ENCOUNTER — PATIENT MESSAGE (OUTPATIENT)
Dept: FAMILY MEDICINE CLINIC | Facility: CLINIC | Age: 69
End: 2024-08-30

## 2024-08-30 DIAGNOSIS — R29.898 JAW CLICKING: ICD-10-CM

## 2024-08-30 DIAGNOSIS — H92.01 RIGHT EAR PAIN: Primary | ICD-10-CM

## 2024-08-30 DIAGNOSIS — R68.84 JAW PAIN: Primary | ICD-10-CM

## 2024-08-30 NOTE — TELEPHONE ENCOUNTER
From: Lela Londono [facility]  To: Tony Walden  Sent: 8/30/2024 11:09 AM CDT  Subject: need referral for oral/facial pain    I went to Dr. Montgomery yesterday but he was out of network and the initial exam cost me $450, I paid with credit card. after exam, He advised pain is from Jaw problem and want to schedule me for MRI and his treatment would cost me $7,800 (which I do not have). I called [facility] and they said since Dr. Montgomery is out of network, they will not pay and [facility] gave me a doctor for Oral/facial in network. I need a referral for: Dr. Kimberli Wills at [place]. phone# [phone]. please fax or email referral to fax# [phone], email: [email or web address]. thank you. Lela

## 2024-08-30 NOTE — TELEPHONE ENCOUNTER
Consulted with Dr. Win tatum to send referral although usually should be sent by PCP.  Contacted patient, informed of above and she states that she needs a referral from her PCP and her ENT MD.  Confirmed name of MD for oral/facial pain is Dr. Matt Tobar (unable to find a Dr. Kimberli Wills), oral and maxillofacial surgety specialist at [facility], referral generated and faxed to [phone] as provided by patient.

## 2024-08-30 NOTE — TELEPHONE ENCOUNTER
From: Lela Londono [facility]  To: Evelio Patterson  Sent: 8/30/2024 10:49 AM CDT  Subject: need referral for oral/facial pain doctor    my ear doctor Dr. Walden stated that the pain I have near ear, on fac/jaw going down neck is from my Jaw (clicking) from years of grinding teeth.   Dr. Walden referred me to Dr. Montgomery who I went to yesterday, but he was out of network and the initial exam cost me $450, I paid with credit card. after exam, He advised pain is from Jaw problem and want to schedule me for MRI and his treatment would cost me $7,800 (which I do not have). I called [facility] and they said since Dr. Montgomery is out of network, they will not pay and [facility] gave me a doctor for Oral/facial in network. I need a referral for: Dr. Kimberli Wills at [place]. phone# [phone]. please fax or email referral to fax# [phone], email: [email or web address]. thank you. Lela

## 2024-08-30 NOTE — TELEPHONE ENCOUNTER
Dr. Patterson,     Patient was recommended by Dr. Walden to see a specialist for grinding teeth and has jaw pain and clicking and needs referral to Dr. Jorge Bowen.    Pended referral please review diagnosis and sign off if you agree.    Thank you.  Misti Bey  [facility]

## 2024-08-31 ENCOUNTER — PATIENT MESSAGE (OUTPATIENT)
Dept: FAMILY MEDICINE CLINIC | Facility: CLINIC | Age: 69
End: 2024-08-31

## 2024-08-31 DIAGNOSIS — M25.511 ACUTE PAIN OF RIGHT SHOULDER: ICD-10-CM

## 2024-08-31 DIAGNOSIS — M54.50 RIGHT-SIDED LOW BACK PAIN WITHOUT SCIATICA, UNSPECIFIED CHRONICITY: Primary | ICD-10-CM

## 2024-09-01 NOTE — TELEPHONE ENCOUNTER
Dr. Patterson, please advise on pended referral.     From: Lela Cannon  To: Evelio Patterson  Sent: 8/31/2024  9:49 AM CDT  Subject: Need referral for chiropractor     Please send referral for chiropractor, I have pain in lower right side of back and right shoulder.    For [facility], in [place], [place]   I went to him last year for same problem

## 2024-09-02 NOTE — TELEPHONE ENCOUNTER
Referral for chiropractor has been reviewed, signed, and placed on the chart.  Please call the patient and let her know.  Thank you.

## 2024-09-03 ENCOUNTER — PATIENT MESSAGE (OUTPATIENT)
Dept: OTOLARYNGOLOGY | Facility: CLINIC | Age: 69
End: 2024-09-03

## 2024-09-03 RX ORDER — AMLODIPINE BESYLATE 5 MG/1
5 TABLET ORAL DAILY
Qty: 90 TABLET | Refills: 3 | Status: SHIPPED | OUTPATIENT
Start: 2024-09-03

## 2024-09-03 NOTE — TELEPHONE ENCOUNTER
Refill Per Protocol     Requested Prescriptions   Pending Prescriptions Disp Refills    AMLODIPINE 5 MG Oral Tab [Pharmacy Med Name: AMLODIPINE BESYLATE 5 MG Tablet] 90 tablet 3     Sig: TAKE 1 TABLET EVERY DAY       Hypertension Medications Protocol Passed - 9/1/2024  1:38 AM        Passed - CMP or BMP in past 12 months        Passed - Last BP reading less than 140/90     BP Readings from Last 1 Encounters:   08/15/24 128/81               Passed - In person appointment or virtual visit in the past 12 mos or appointment in next 3 mos     Recent Outpatient Visits              1 week ago Post-operative state    [facility] Rob Vee MD    Office Visit    3 weeks ago Sebaceous cyst of skin of breast, unspecified laterality    [facility] Rob Vee MD    Office Visit    4 weeks ago [facility]    [facility]    4 weeks ago History of adenomatous polyp of colon    [facility] Roberta Irizarry MD    Office Visit    1 month ago Sebaceous cyst    [facility] Angelique Vee MD    Office Visit          Future Appointments         Provider Department Appt Notes    In 1 week Anselmo Martin DPM [facility] [place] Pain in foot from callous and plantar fasciitis    In 1 week Rob Vee MD [facility] [place] Hemorrhoids    In 1 month [address] [facility]     In 9 months Evelio Patterson,  [facility] annual Medicare wellness exam                    Passed - EGFRCR or GFRAA > 50     GFR Evaluation  EGFRCR: 80 , resulted on 7/2/2024                 Future Appointments         Provider Department Appt Notes    In 1 week  Anselmo Martin, SIVAM [facility] Pain in foot from callous and plantar fasciitis    In 1 week Rob Vee MD [facility] Hemorrhoids    In 1 month [address] [facility]     In 9 months Evelio Patterson,  [facility] annual Medicare wellness exam          Recent Outpatient Visits              1 week ago Post-operative state    [facility] Rob Vee MD    Office Visit    3 weeks ago Sebaceous cyst of skin of breast, unspecified laterality    [facility] Rob Vee MD    Office Visit    4 weeks ago Ear pain, right    [facility] Tony Walden MD    Office Visit    4 weeks ago History of adenomatous polyp of colon    [facility] Robetra Irizarry MD    Office Visit    1 month ago Sebaceous cyst    [facility] Angelique Vee MD    Office Visit

## 2024-09-04 NOTE — TELEPHONE ENCOUNTER
From: Lela Londono [facility]  To: Tony Walden  Sent: 9/3/2024 2:55 PM CDT  Subject: need referral for oral/facial pain    I requested a referral 8/30/24, for Matt Bowen. please advise why it is showing (cancelled) Dr. Bowen office requires 2 referrals from primary and 2nd doctor.

## 2024-09-13 ENCOUNTER — HOSPITAL ENCOUNTER (OUTPATIENT)
Dept: GENERAL RADIOLOGY | Facility: HOSPITAL | Age: 69
Discharge: HOME | End: 2024-09-13
Attending: PODIATRIST
Payer: MEDICARE

## 2024-09-13 ENCOUNTER — OFFICE VISIT (OUTPATIENT)
Dept: PODIATRY CLINIC | Facility: CLINIC | Age: 69
End: 2024-09-13
Payer: MEDICARE

## 2024-09-13 DIAGNOSIS — M79.671 PAIN IN BOTH FEET: Primary | ICD-10-CM

## 2024-09-13 DIAGNOSIS — M77.42 METATARSALGIA OF BOTH FEET: ICD-10-CM

## 2024-09-13 DIAGNOSIS — M77.41 METATARSALGIA OF BOTH FEET: ICD-10-CM

## 2024-09-13 DIAGNOSIS — L84 CALLUS OF FOOT: ICD-10-CM

## 2024-09-13 DIAGNOSIS — M79.672 PAIN IN BOTH FEET: ICD-10-CM

## 2024-09-13 DIAGNOSIS — M79.671 PAIN IN BOTH FEET: ICD-10-CM

## 2024-09-13 DIAGNOSIS — M19.072 ARTHRITIS OF BOTH MIDFEET: ICD-10-CM

## 2024-09-13 DIAGNOSIS — M79.672 PAIN IN BOTH FEET: Primary | ICD-10-CM

## 2024-09-13 DIAGNOSIS — M19.071 ARTHRITIS OF BOTH MIDFEET: ICD-10-CM

## 2024-09-13 PROCEDURE — 99213 OFFICE O/P EST LOW 20 MIN: CPT | Performed by: PODIATRIST

## 2024-09-13 PROCEDURE — 73630 X-RAY EXAM OF FOOT: CPT | Performed by: PODIATRIST

## 2024-09-13 PROCEDURE — 1159F MED LIST DOCD IN RCRD: CPT | Performed by: PODIATRIST

## 2024-09-16 ENCOUNTER — OFFICE VISIT (OUTPATIENT)
Dept: SURGERY | Facility: CLINIC | Age: 69
End: 2024-09-16

## 2024-09-16 VITALS — WEIGHT: 165 LBS | HEIGHT: 63 IN | BODY MASS INDEX: 29.23 KG/M2

## 2024-09-16 DIAGNOSIS — K64.9 HEMORRHOIDS, UNSPECIFIED HEMORRHOID TYPE: Primary | ICD-10-CM

## 2024-09-16 NOTE — PROCEDURES
Procedure note    Patient placed in the left lateral decubitus position.  Anoscope was inserted.  Large hemorrhoid identified in the posterior position doubly banded without complication.  She tolerated this well.

## 2024-09-16 NOTE — PROGRESS NOTES
[facility]    Progress Note    Lela Acostaley Patient Status:  Specimen    6/10/1955 MRN [id number]   Location [facility] Attending No att. providers found   Hosp Day # 0 PCP Evelio Patterson DO     Assessment and Plan:     Bleeding secondary to internal hemorrhoids.  Anoscopy and banding performed today.  Maintain high-fiber diet follow-up for problems    Subjective:   Complains of blood mixed with her stool recent colonoscopy shows moderate internal hemorrhoid    Objective:   No data found.        Exam: Anoscopy performed.  Moderate internal hemorrhoid in the posterior position, grade 3     Results:     Lab Results   Component Value Date    WBC 4.8 2024    HGB 13.6 2024    HCT 39.6 2024    .0 2024    CREATSERUM 0.79 2024    BUN 8 (L) 2024     2024    K 3.7 2024     2024    CO2 30.0 2024    GLU 79 2024    CA 9.2 2024    ALB 4.3 2024    ALKPHO 93 2024    BILT 0.6 2024    TP 7.2 2024    AST 29 2024    ALT 19 2024    INR 0.99 2021    TSH 3.186 2024    B12 >2,000 (H) 2024       No results found.            Rob Vee MD  2024

## 2024-09-16 NOTE — PATIENT INSTRUCTIONS
Maintain a high-fiber diet.  Avoid vigorous exercise or heavy lifting for 1 week.  Follow-up for problems

## 2024-09-17 NOTE — PROGRESS NOTES
Lela Cannon is a 69 year old female.   Chief Complaint   Patient presents with    Foot Pain     Bilateral foot - onset R foot a yr ago- L 2 mos ago- denies injury- also here for bilateral foot lesions check - was seen by Dr. Baxter on 3/07/2024         HPI:   Complaining of bilateral foot pain right foot started approximately a year ago left about 2 months ago denies any injury or trauma change in shoe gear or activities.  She does have foot lesions she wants them checked.  At today's visit reviewed nurse's history as taken above, allergies medications and medical history as documented below.  All changes duly noted  Allergies: Lisinopril and Penicillins   Current Outpatient Medications   Medication Sig Dispense Refill    amLODIPine 5 MG Oral Tab Take 1 tablet (5 mg total) by mouth daily. 90 tablet 3    ammonium lactate (AMLACTIN DAILY) 12 % External Lotion Apply 1 Application topically as needed for Dry Skin. 225 g 3    vitamin B-12 50 MCG Oral Tab Take 1 tablet (50 mcg total) by mouth daily.      Cholecalciferol (VITAMIN D) 50 MCG (2000 UT) Oral Cap Take by mouth.      vitamin E 100 UNITS Oral Cap Take 1 capsule (100 Units total) by mouth daily.      Vitamin C 500 MG Oral Tab Take 1 tablet (500 mg total) by mouth daily.      omega-3 Oral Liquid Take by mouth daily.      Zinc 30 MG Oral Cap Take by mouth.      Magnesium 200 MG Oral Tab Take by mouth.      HYDROcodone-acetaminophen 7.5-325 MG Oral Tab Take 1 tablet by mouth every 4 to 6 hours as needed for Pain. (Patient not taking: Reported on 8/21/2024) 10 tablet 0    GARLIC OR Take 1 tablet by mouth daily.      Misc Natural Products (GLUCOSAMINE CHONDROITIN ADV OR) Take 1 tablet by mouth daily.      Coenzyme Q10 (CO Q-10 OR) Take 1 tablet by mouth daily.      Lactobacillus (PROBIATA OR) Take by mouth.        Past Medical History:    Back problem    Chronic back pain    Essential hypertension    High blood pressure    Trigger thumb, right thumb     Tubular adenoma of colon    repeat CLN in 2025    Visual impairment      Past Surgical History:   Procedure Laterality Date    Colonoscopy N/A 11/11/2020    Procedure: COLONOSCOPY;  Surgeon: LUZ ELENA Reyes MD;  Location: [facility] ENDOSCOPY    Colonoscopy N/A 08/15/2024    Procedure: COLONOSCOPY;  Surgeon: Les Red MD;  Location: [facility] MAIN OR    Hysterectomy      Incise finger tendon sheath Right 01/26/2024    trigger release right thumb    Skin surgery  08/13/2024    exc lipoma chest and back      History reviewed. No pertinent family history.   Social History     Socioeconomic History    Marital status:    Tobacco Use    Smoking status: Never    Smokeless tobacco: Never   Vaping Use    Vaping status: Never Used   Substance and Sexual Activity    Alcohol use: Never    Drug use: Never   Other Topics Concern    Caffeine Concern No    Exercise Yes           REVIEW OF SYSTEMS:   Today reviewed systens as documented below  GENERAL HEALTH: feels well otherwise  SKIN: Refer to exam below  RESPIRATORY: denies shortness of breath with exertion  CARDIOVASCULAR: denies chest pain on exertion  GI: denies abdominal pain and denies heartburn  NEURO: denies headaches    EXAM:   There were no vitals taken for this visit.  GENERAL: well developed, well nourished, in no apparent distress  EXTREMITIES:   1. Integument: Skin on her feet is warm and dry she has hyperkeratosis on her right heel.  She has hyperkeratosis at the posterior lateral aspect of the heel on both feet minimal on the left mostly on the right and hyperkeratosis underneath the fifth metatarsal head on both feet.   2. Vascular: Patient has palpable pulses   3. Neurologic: Patient has intact sensorium   4. Musculoskeletal: Patient has good muscle strength and mild hallux limitus some hyperkeratosis the great toe bilateral.  Patient does have mild pain on palpation of the right heel.  X-rays were taken I did review the films they do show elevated  first ray.  No fractures no dislocations are noted.    ASSESSMENT AND PLAN:   Diagnoses and all orders for this visit:    Pain in both feet  -     XR FOOT, COMPLETE (MIN 3 VIEWS), BILAT (CPT=73630-50); Future  -     DME - External    Callus of foot  -     DME - External    Metatarsalgia of both feet  -     DME - External    Arthritis of both midfeet  -     DME - External        Plan: Educated the patient on proper sitting techniques to keep the heels offloaded the calluses on her heels are basically from too much pressure.  She understood.  Trimmed down and debrided all hyperkeratoses to the patient's relief advised patient to follow-up again as needed for pain and routine care.  I also highly recommended the patient she get orthotics DME prescription dispensed for  clinics.  This will help support the arch and offload the metatarsal heads.  Follow-up as needed for routine care    The patient indicates understanding of these issues and agrees to the plan.    Anselmo Martin, EMMANUEL

## 2024-09-25 ENCOUNTER — OFFICE VISIT (OUTPATIENT)
Dept: FAMILY MEDICINE CLINIC | Facility: CLINIC | Age: 69
End: 2024-09-25
Payer: MEDICARE

## 2024-09-25 ENCOUNTER — HOSPITAL ENCOUNTER (OUTPATIENT)
Dept: GENERAL RADIOLOGY | Age: 69
Discharge: HOME | End: 2024-09-25
Attending: FAMILY MEDICINE
Payer: MEDICARE

## 2024-09-25 VITALS
SYSTOLIC BLOOD PRESSURE: 148 MMHG | DIASTOLIC BLOOD PRESSURE: 70 MMHG | WEIGHT: 163 LBS | BODY MASS INDEX: 29 KG/M2 | OXYGEN SATURATION: 93 % | HEART RATE: 85 BPM

## 2024-09-25 DIAGNOSIS — M79.672 LEFT FOOT PAIN: Primary | ICD-10-CM

## 2024-09-25 DIAGNOSIS — M79.672 LEFT FOOT PAIN: ICD-10-CM

## 2024-09-25 PROCEDURE — 1159F MED LIST DOCD IN RCRD: CPT | Performed by: FAMILY MEDICINE

## 2024-09-25 PROCEDURE — 3077F SYST BP >= 140 MM HG: CPT | Performed by: FAMILY MEDICINE

## 2024-09-25 PROCEDURE — 3078F DIAST BP <80 MM HG: CPT | Performed by: FAMILY MEDICINE

## 2024-09-25 PROCEDURE — 73630 X-RAY EXAM OF FOOT: CPT | Performed by: FAMILY MEDICINE

## 2024-09-25 PROCEDURE — 99213 OFFICE O/P EST LOW 20 MIN: CPT | Performed by: FAMILY MEDICINE

## 2024-09-25 PROCEDURE — 1125F AMNT PAIN NOTED PAIN PRSNT: CPT | Performed by: FAMILY MEDICINE

## 2024-09-25 RX ORDER — NAPROXEN 500 MG/1
500 TABLET ORAL 2 TIMES DAILY WITH MEALS
Qty: 30 TABLET | Refills: 0 | Status: SHIPPED | OUTPATIENT
Start: 2024-09-25

## 2024-09-25 NOTE — PROGRESS NOTES
Subjective:   Patient ID: Lela Cannon is a 69 year old female.    Foot Pain   This is a new problem. There has been no history of extremity trauma. The problem occurs constantly. The problem has been waxing and waning. Pertinent negatives include no fever or numbness. The symptoms are aggravated by standing.       History/Other:   Review of Systems   Constitutional:  Negative for fever.   Neurological:  Negative for numbness.     Current Outpatient Medications   Medication Sig Dispense Refill    naproxen 500 MG Oral Tab Take 1 tablet (500 mg total) by mouth 2 (two) times daily with meals. For 5 days then as needed 30 tablet 0    amLODIPine 5 MG Oral Tab Take 1 tablet (5 mg total) by mouth daily. 90 tablet 3    vitamin B-12 50 MCG Oral Tab Take 1 tablet (50 mcg total) by mouth daily.      Cholecalciferol (VITAMIN D) 50 MCG (2000 UT) Oral Cap Take by mouth.      vitamin E 100 UNITS Oral Cap Take 1 capsule (100 Units total) by mouth daily.      Vitamin C 500 MG Oral Tab Take 1 tablet (500 mg total) by mouth daily.      omega-3 Oral Liquid Take by mouth daily.      Zinc 30 MG Oral Cap Take by mouth.      Magnesium 200 MG Oral Tab Take by mouth.      GARLIC OR Take 1 tablet by mouth daily.      Misc Natural Products (GLUCOSAMINE CHONDROITIN ADV OR) Take 1 tablet by mouth daily.      Coenzyme Q10 (CO Q-10 OR) Take 1 tablet by mouth daily.      Lactobacillus (PROBIATA OR) Take by mouth.       Allergies:  Allergies   Allergen Reactions    Lisinopril ANAPHYLAXIS    Penicillins ANAPHYLAXIS       Objective:   Physical Exam  Constitutional:       Appearance: Normal appearance.   Cardiovascular:      Rate and Rhythm: Normal rate.   Pulmonary:      Effort: Pulmonary effort is normal.   Musculoskeletal:      Comments: Left foot without deformity.  Tender dorsal tarsometatarsal joints.  No warmth or erythema.   Neurological:      Mental Status: She is alert.         Assessment & Plan:   1. Left foot pain    Left dorsal  midfoot pain started 2 nights ago after having foot flexed in awkward position while completing house project.  Pain continued yesterday, also in heel.  On exam tenderness over dorsal tarsal/metatarsal joints.  Suspect strain.  Recommend rest, avoid painful activities.  Ice, elevation 3-4 times daily.  Naproxen as directed reviewed instructions and side effects.   if not improving in 1 week recommend evaluation with podiatry who she recently saw for calluses on right foot    No orders of the defined types were placed in this encounter.      Meds This Visit:  Requested Prescriptions     Signed Prescriptions Disp Refills    naproxen 500 MG Oral Tab 30 tablet 0     Sig: Take 1 tablet (500 mg total) by mouth 2 (two) times daily with meals. For 5 days then as needed       Imaging & Referrals:  XR FOOT (2 VIEW), LEFT (CPT=73620)

## 2024-10-15 ENCOUNTER — OFFICE VISIT (OUTPATIENT)
Dept: FAMILY MEDICINE CLINIC | Facility: CLINIC | Age: 69
End: 2024-10-15
Payer: MEDICARE

## 2024-10-15 ENCOUNTER — NURSE TRIAGE (OUTPATIENT)
Dept: FAMILY MEDICINE CLINIC | Facility: CLINIC | Age: 69
End: 2024-10-15

## 2024-10-15 VITALS
BODY MASS INDEX: 28.88 KG/M2 | WEIGHT: 163 LBS | HEART RATE: 88 BPM | HEIGHT: 63 IN | DIASTOLIC BLOOD PRESSURE: 80 MMHG | SYSTOLIC BLOOD PRESSURE: 160 MMHG | OXYGEN SATURATION: 98 %

## 2024-10-15 DIAGNOSIS — H57.9 EYE DISORDER: ICD-10-CM

## 2024-10-15 DIAGNOSIS — I10 ESSENTIAL HYPERTENSION: Primary | ICD-10-CM

## 2024-10-15 PROCEDURE — 99213 OFFICE O/P EST LOW 20 MIN: CPT | Performed by: FAMILY MEDICINE

## 2024-10-15 PROCEDURE — 1159F MED LIST DOCD IN RCRD: CPT | Performed by: FAMILY MEDICINE

## 2024-10-15 PROCEDURE — 3079F DIAST BP 80-89 MM HG: CPT | Performed by: FAMILY MEDICINE

## 2024-10-15 PROCEDURE — 3008F BODY MASS INDEX DOCD: CPT | Performed by: FAMILY MEDICINE

## 2024-10-15 PROCEDURE — 3077F SYST BP >= 140 MM HG: CPT | Performed by: FAMILY MEDICINE

## 2024-10-15 PROCEDURE — 1160F RVW MEDS BY RX/DR IN RCRD: CPT | Performed by: FAMILY MEDICINE

## 2024-10-15 RX ORDER — ERYTHROMYCIN 5 MG/G
1 OINTMENT OPHTHALMIC NIGHTLY
Qty: 1 EACH | Refills: 0 | Status: SHIPPED | OUTPATIENT
Start: 2024-10-15

## 2024-10-15 RX ORDER — LEVOCETIRIZINE DIHYDROCHLORIDE 5 MG/1
5 TABLET, FILM COATED ORAL EVERY EVENING
Qty: 20 TABLET | Refills: 0 | Status: SHIPPED | OUTPATIENT
Start: 2024-10-15

## 2024-10-15 RX ORDER — AZELASTINE HYDROCHLORIDE 0.5 MG/ML
1 SOLUTION/ DROPS OPHTHALMIC 2 TIMES DAILY
Qty: 1 EACH | Refills: 0 | Status: SHIPPED | OUTPATIENT
Start: 2024-10-15 | End: 2025-02-12

## 2024-10-15 NOTE — PROGRESS NOTES
Subjective:   Patient ID: Llea Cannon is a 69 year old female.    HPI  Patient has some burning and increased tearing right eye and seropurulent discharge this am   No foreign objects     History/Other:   Review of Systems  Constitutional: Negative.  Negative for activity change, appetite change, diaphoresis and fatigue.   Heent see hpi  Respiratory: Negative.  Negative for apnea, cough, chest tightness and shortness of breath.    Cardiovascular: Negative.  Negative for chest pain, palpitations and leg swelling.     Current Outpatient Medications   Medication Sig Dispense Refill    levocetirizine 5 MG Oral Tab Take 1 tablet (5 mg total) by mouth every evening. 20 tablet 0    Azelastine HCl 0.05 % Ophthalmic Solution Apply 1 drop to eye 2 (two) times daily. 1 each 0    amLODIPine 5 MG Oral Tab Take 1 tablet (5 mg total) by mouth daily. 90 tablet 3    vitamin B-12 50 MCG Oral Tab Take 1 tablet (50 mcg total) by mouth daily.      Cholecalciferol (VITAMIN D) 50 MCG (2000 UT) Oral Cap Take by mouth.      vitamin E 100 UNITS Oral Cap Take 1 capsule (100 Units total) by mouth daily.      Vitamin C 500 MG Oral Tab Take 1 tablet (500 mg total) by mouth daily.      omega-3 Oral Liquid Take by mouth daily.      Zinc 30 MG Oral Cap Take by mouth.      Magnesium 200 MG Oral Tab Take by mouth.      GARLIC OR Take 1 tablet by mouth daily.      Misc Natural Products (GLUCOSAMINE CHONDROITIN ADV OR) Take 1 tablet by mouth daily.      Coenzyme Q10 (CO Q-10 OR) Take 1 tablet by mouth daily.      Lactobacillus (PROBIATA OR) Take by mouth.       Allergies:Allergies[1]    Objective:   Physical Exam  Heent increased tearing   Heart regular no murmur   Lungs ctab   Assessment & Plan:   Eye disorder - appears as allergic conjuctivitis   Optivar eye drops   Zyrtec 1 po every day prn   F/u prn     No orders of the defined types were placed in this encounter.      Meds This Visit:  Requested Prescriptions     Signed Prescriptions  Disp Refills    levocetirizine 5 MG Oral Tab 20 tablet 0     Sig: Take 1 tablet (5 mg total) by mouth every evening.    Azelastine HCl 0.05 % Ophthalmic Solution 1 each 0     Sig: Apply 1 drop to eye 2 (two) times daily.       Imaging & Referrals:  OPHTHALMOLOGY - INTERNAL         [1]   Allergies  Allergen Reactions    Lisinopril ANAPHYLAXIS    Penicillins ANAPHYLAXIS

## 2024-10-15 NOTE — TELEPHONE ENCOUNTER
Action Requested: Summary for Provider     []  Critical Lab, Recommendations Needed  [] Need Additional Advice  [x]   FYI    []   Need Orders  [] Need Medications Sent to Pharmacy  []  Other     SUMMARY: Per protocol, patient should be seen in the office today or tomorrow. Appointment scheduled.    Future Appointments   Date Time Provider Department Center   10/15/2024  1:30 PM Cherry Gill MD [facility]   10/28/2024  2:20 PM [facility] RM4 [facility] EM [facility]   6/24/2025 11:00 AM Evelio Patterson DO [facility]     Reason for call: Lower Eyelid Swelling  Onset: 10/13    Patient reports of right eye irritation with mild itching, burning, and slightly swollen bottom lid that she started noticing on Sunday. She woke up yesterday with crust in the right eye. She has been alternating heat and cold compress, also applying artificial tears.    She thought symptoms will go away but states it still feels bad this morning. Right eye is watery, mildly itchy, and bottom of eyelid remains swollen. Care advice given per protocol - keep right eye clean, wash hands before touching right eye, use clean soft cloth to wipe, avoid scratching. Patient verbalized understanding and agreed with plan of care.     Reason for Disposition   Patient wants to be seen    Protocols used: Eyelid Swelling-A-OH

## 2024-10-18 ENCOUNTER — TELEPHONE (OUTPATIENT)
Dept: SURGERY | Facility: CLINIC | Age: 69
End: 2024-10-18

## 2024-10-18 NOTE — TELEPHONE ENCOUNTER
Called patient back.S/P hemorrhoid banding 9/16/24.She has noticed some minor blood spotting recently. Discussed etiology of hemorrhoids, and scheduled patient for follow up/poss hemorrhoid banding on Tues 10/22/24.

## 2024-10-18 NOTE — TELEPHONE ENCOUNTER
Patient asking if its normal to still see some blood staining in the stool post her procedure. Please call at [phone],thanks.

## 2024-10-28 ENCOUNTER — HOSPITAL ENCOUNTER (OUTPATIENT)
Dept: MAMMOGRAPHY | Facility: HOSPITAL | Age: 69
Discharge: HOME | End: 2024-10-28
Attending: FAMILY MEDICINE
Payer: MEDICARE

## 2024-10-28 DIAGNOSIS — Z12.31 ENCOUNTER FOR SCREENING MAMMOGRAM FOR MALIGNANT NEOPLASM OF BREAST: ICD-10-CM

## 2024-10-28 PROCEDURE — 77067 SCR MAMMO BI INCL CAD: CPT | Performed by: FAMILY MEDICINE

## 2024-10-28 PROCEDURE — 77063 BREAST TOMOSYNTHESIS BI: CPT | Performed by: FAMILY MEDICINE

## 2024-11-13 ENCOUNTER — TELEPHONE (OUTPATIENT)
Dept: PODIATRY CLINIC | Facility: CLINIC | Age: 69
End: 2024-11-13

## 2024-11-13 NOTE — TELEPHONE ENCOUNTER
Patient states she needs Dr. Martin to send a referral to her [employer] insurance stating that she needs orthotic inserts and then they will approve it. Patient states was told to speak to  Nayeli about this and would like to talk to her if possible. Please call.

## 2024-11-20 ENCOUNTER — PATIENT MESSAGE (OUTPATIENT)
Dept: PODIATRY CLINIC | Facility: CLINIC | Age: 69
End: 2024-11-20

## 2024-11-25 NOTE — TELEPHONE ENCOUNTER
Spoke with patient. She is needing a letter of medical necessity to be able to get orthotics. I stated I would reach out to Provider and that I did not believe he would have any issue with us sending them a letter. I advised that she work on getting us a fax number to send it to and I would work on the letter. She will send us the fax number through [employer] when she receives it and we will per her request also upload the letter to her mychart.

## 2024-11-25 NOTE — TELEPHONE ENCOUNTER
Patient states that she did call up her [employer] and was told that they do not cover Orthotic Inserts unless there is a diagnosis of Diabetes. Patient apologizes and says to cancel request for Referral.

## 2024-12-09 ENCOUNTER — OFFICE VISIT (OUTPATIENT)
Dept: SURGERY | Facility: CLINIC | Age: 69
End: 2024-12-09
Payer: MEDICARE

## 2024-12-09 VITALS
HEIGHT: 63 IN | WEIGHT: 168 LBS | BODY MASS INDEX: 29.77 KG/M2 | SYSTOLIC BLOOD PRESSURE: 150 MMHG | DIASTOLIC BLOOD PRESSURE: 86 MMHG

## 2024-12-09 DIAGNOSIS — K62.5 RECTAL BLEEDING: Primary | ICD-10-CM

## 2024-12-09 NOTE — TELEPHONE ENCOUNTER
We need to conduct a letter that states that the orthotics are necessary to control midfoot motion and decrease the patient's pain allowing her to walk without pain and function better during her day-to-day activities.  Without this she would be restricted by pain.  It will help offload the ball of her foot where she has painful calluses as well by redistributing weight more evenly.  Please reconsider pain after this.

## 2024-12-09 NOTE — PROGRESS NOTES
Established Patient Follow-up      12/9/2024    Lela Acostaley 69 year old      HPI    Patient here for follow-up on rectal bleeding.  She had hemorrhoid banding done several months ago.  Patient also has a residual 2 mm area of keratin within a chest cyst which was removed with a needle.    Exam  Digital anoscopic exam is performed.  Grade 3 hemorrhoids identified in the right posterior and left lateral position.  Both doubly banded see procedure note.      Assessment/Plan  Assessment   1. Rectal bleeding        See procedure note       Rob Vee MD

## 2024-12-09 NOTE — PROCEDURES
Surgery procedure note    Timeout performed and consent signed.  Anoscope inserted.  Patient in the left lateral decubitus position.  2 large grade 3 hemorrhoids in the right anterior left lateral position are doubly banded without complication.  She tolerated this well.

## 2024-12-13 ENCOUNTER — PATIENT MESSAGE (OUTPATIENT)
Dept: FAMILY MEDICINE CLINIC | Facility: CLINIC | Age: 69
End: 2024-12-13

## 2024-12-13 ENCOUNTER — TELEPHONE (OUTPATIENT)
Dept: FAMILY MEDICINE CLINIC | Facility: CLINIC | Age: 69
End: 2024-12-13

## 2024-12-13 DIAGNOSIS — M79.604 PAIN IN BOTH LOWER EXTREMITIES: ICD-10-CM

## 2024-12-13 DIAGNOSIS — E53.8 VITAMIN B12 DEFICIENCY: ICD-10-CM

## 2024-12-13 DIAGNOSIS — E55.9 VITAMIN D INSUFFICIENCY: Primary | ICD-10-CM

## 2024-12-13 DIAGNOSIS — M79.605 PAIN IN BOTH LOWER EXTREMITIES: ICD-10-CM

## 2024-12-13 NOTE — TELEPHONE ENCOUNTER
Dr. Patterson please see patient Mychart message below. Patient did call the office. Patient stated that once in a while in the morning when she is doing her exercise she will start to have muscle cramping on her legs.Sometimes its her left or it can be her rights. But for the past week it has become more frequent. She remember about 15 years ago when this was happening her potassium was low. So she was wanting to get her potassium checked and her vitamin B12. Patient was told back in 6/2024 to decrease her B12 dose and she did from every day to once a week so she will like to know where her levels is. Also she feels tired at times and wants her Vit D and magnesium checked. Dr. Patterson please advise.   Per patient she takes Potassium citrate 99 mg once a day and patient is trying to drink more water.  I have pended the orders for your review and approval.

## 2024-12-13 NOTE — TELEPHONE ENCOUNTER
misa teixeira P Em Rn Triage (supporting Evelio Patterson DO)   LJ      12/13/24  8:18 AM  would you please order blood test for me to check my potassium,  about 15 years ago, when I started having muscle cramps my potassium was low, also test blood for B12, my last B12 results were too high so I reduced my supplement down to once a week, but I am a little tired at times, also check my Magnesium and Vitamin D,  I am trying to drink a lot of water (1/2 my body weight) and hope it is not flushing my potassium and other vitamins out.  Thanks

## 2024-12-16 ENCOUNTER — LAB ENCOUNTER (OUTPATIENT)
Dept: LAB | Age: 69
End: 2024-12-16
Attending: FAMILY MEDICINE
Payer: MEDICARE

## 2024-12-16 DIAGNOSIS — M79.605 PAIN IN BOTH LOWER EXTREMITIES: ICD-10-CM

## 2024-12-16 DIAGNOSIS — E55.9 VITAMIN D INSUFFICIENCY: ICD-10-CM

## 2024-12-16 DIAGNOSIS — M79.604 PAIN IN BOTH LOWER EXTREMITIES: ICD-10-CM

## 2024-12-16 DIAGNOSIS — E53.8 VITAMIN B12 DEFICIENCY: ICD-10-CM

## 2024-12-16 LAB
MAGNESIUM SERPL-MCNC: 2 MG/DL (ref 1.6–2.6)
POTASSIUM SERPL-SCNC: 3.6 MMOL/L (ref 3.5–5.1)
VIT B12 SERPL-MCNC: 1492 PG/ML (ref 211–911)
VIT D+METAB SERPL-MCNC: 86.3 NG/ML (ref 30–100)

## 2024-12-16 PROCEDURE — 82607 VITAMIN B-12: CPT

## 2024-12-16 PROCEDURE — 36415 COLL VENOUS BLD VENIPUNCTURE: CPT

## 2024-12-16 PROCEDURE — 82306 VITAMIN D 25 HYDROXY: CPT

## 2024-12-16 PROCEDURE — 84132 ASSAY OF SERUM POTASSIUM: CPT

## 2024-12-16 PROCEDURE — 83735 ASSAY OF MAGNESIUM: CPT

## 2024-12-17 ENCOUNTER — PATIENT MESSAGE (OUTPATIENT)
Dept: FAMILY MEDICINE CLINIC | Facility: CLINIC | Age: 69
End: 2024-12-17

## 2025-02-01 ENCOUNTER — APPOINTMENT (OUTPATIENT)
Dept: CT IMAGING | Facility: HOSPITAL | Age: 70
End: 2025-02-01
Attending: EMERGENCY MEDICINE
Payer: MEDICARE

## 2025-02-01 ENCOUNTER — APPOINTMENT (OUTPATIENT)
Dept: MRI IMAGING | Facility: HOSPITAL | Age: 70
End: 2025-02-01
Attending: EMERGENCY MEDICINE
Payer: MEDICARE

## 2025-02-01 ENCOUNTER — HOSPITAL ENCOUNTER (EMERGENCY)
Facility: HOSPITAL | Age: 70
Discharge: HOME | End: 2025-02-01
Attending: EMERGENCY MEDICINE
Payer: MEDICARE

## 2025-02-01 VITALS
RESPIRATION RATE: 19 BRPM | HEART RATE: 66 BPM | HEIGHT: 63.5 IN | TEMPERATURE: 98 F | DIASTOLIC BLOOD PRESSURE: 90 MMHG | WEIGHT: 158 LBS | SYSTOLIC BLOOD PRESSURE: 151 MMHG | BODY MASS INDEX: 27.65 KG/M2 | OXYGEN SATURATION: 97 %

## 2025-02-01 DIAGNOSIS — H53.9 VISION CHANGES: ICD-10-CM

## 2025-02-01 DIAGNOSIS — R42 DIZZINESS: Primary | ICD-10-CM

## 2025-02-01 LAB
ALBUMIN SERPL-MCNC: 4.3 G/DL (ref 3.2–4.8)
ALBUMIN/GLOB SERPL: 1.5 {RATIO} (ref 1–2)
ALP LIVER SERPL-CCNC: 104 U/L
ALT SERPL-CCNC: 14 U/L
ANION GAP SERPL CALC-SCNC: 7 MMOL/L (ref 0–18)
APTT PPP: 26.9 SECONDS (ref 23–36)
AST SERPL-CCNC: 21 U/L (ref ?–34)
BASOPHILS # BLD AUTO: 0.03 X10(3) UL (ref 0–0.2)
BASOPHILS NFR BLD AUTO: 0.6 %
BILIRUB SERPL-MCNC: 0.3 MG/DL (ref 0.2–1.1)
BUN BLD-MCNC: 15 MG/DL (ref 9–23)
BUN/CREAT SERPL: 15.2 (ref 10–20)
CALCIUM BLD-MCNC: 9 MG/DL (ref 8.7–10.4)
CHLORIDE SERPL-SCNC: 109 MMOL/L (ref 98–112)
CO2 SERPL-SCNC: 30 MMOL/L (ref 21–32)
CREAT BLD-MCNC: 0.99 MG/DL
DEPRECATED RDW RBC AUTO: 46.5 FL (ref 35.1–46.3)
EGFRCR SERPLBLD CKD-EPI 2021: 61 ML/MIN/1.73M2 (ref 60–?)
EOSINOPHIL # BLD AUTO: 0.08 X10(3) UL (ref 0–0.7)
EOSINOPHIL NFR BLD AUTO: 1.5 %
ERYTHROCYTE [DISTWIDTH] IN BLOOD BY AUTOMATED COUNT: 14.4 % (ref 11–15)
GLOBULIN PLAS-MCNC: 2.9 G/DL (ref 2–3.5)
GLUCOSE BLD-MCNC: 95 MG/DL (ref 70–99)
HCT VFR BLD AUTO: 40.7 %
HGB BLD-MCNC: 13.1 G/DL
IMM GRANULOCYTES # BLD AUTO: 0.02 X10(3) UL (ref 0–1)
IMM GRANULOCYTES NFR BLD: 0.4 %
INR BLD: 0.88 (ref 0.8–1.2)
LYMPHOCYTES # BLD AUTO: 1.3 X10(3) UL (ref 1–4)
LYMPHOCYTES NFR BLD AUTO: 24.4 %
MCH RBC QN AUTO: 28.6 PG (ref 26–34)
MCHC RBC AUTO-ENTMCNC: 32.2 G/DL (ref 31–37)
MCV RBC AUTO: 88.9 FL
MONOCYTES # BLD AUTO: 0.42 X10(3) UL (ref 0.1–1)
MONOCYTES NFR BLD AUTO: 7.9 %
NEUTROPHILS # BLD AUTO: 3.48 X10 (3) UL (ref 1.5–7.7)
NEUTROPHILS # BLD AUTO: 3.48 X10(3) UL (ref 1.5–7.7)
NEUTROPHILS NFR BLD AUTO: 65.2 %
OSMOLALITY SERPL CALC.SUM OF ELEC: 303 MOSM/KG (ref 275–295)
PLATELET # BLD AUTO: 167 10(3)UL (ref 150–450)
POTASSIUM SERPL-SCNC: 3.6 MMOL/L (ref 3.5–5.1)
PROT SERPL-MCNC: 7.2 G/DL (ref 5.7–8.2)
PROTHROMBIN TIME: 12.5 SECONDS (ref 11.6–14.8)
RBC # BLD AUTO: 4.58 X10(6)UL
SODIUM SERPL-SCNC: 146 MMOL/L (ref 136–145)
TROPONIN I SERPL HS-MCNC: 7 NG/L
WBC # BLD AUTO: 5.3 X10(3) UL (ref 4–11)

## 2025-02-01 PROCEDURE — 99285 EMERGENCY DEPT VISIT HI MDM: CPT

## 2025-02-01 PROCEDURE — 85730 THROMBOPLASTIN TIME PARTIAL: CPT | Performed by: EMERGENCY MEDICINE

## 2025-02-01 PROCEDURE — 80053 COMPREHEN METABOLIC PANEL: CPT | Performed by: EMERGENCY MEDICINE

## 2025-02-01 PROCEDURE — 99284 EMERGENCY DEPT VISIT MOD MDM: CPT

## 2025-02-01 PROCEDURE — 93005 ELECTROCARDIOGRAM TRACING: CPT

## 2025-02-01 PROCEDURE — 93010 ELECTROCARDIOGRAM REPORT: CPT

## 2025-02-01 PROCEDURE — 85025 COMPLETE CBC W/AUTO DIFF WBC: CPT | Performed by: EMERGENCY MEDICINE

## 2025-02-01 PROCEDURE — 85610 PROTHROMBIN TIME: CPT | Performed by: EMERGENCY MEDICINE

## 2025-02-01 PROCEDURE — 70498 CT ANGIOGRAPHY NECK: CPT | Performed by: EMERGENCY MEDICINE

## 2025-02-01 PROCEDURE — 36415 COLL VENOUS BLD VENIPUNCTURE: CPT

## 2025-02-01 PROCEDURE — 70496 CT ANGIOGRAPHY HEAD: CPT | Performed by: EMERGENCY MEDICINE

## 2025-02-01 PROCEDURE — 70551 MRI BRAIN STEM W/O DYE: CPT | Performed by: EMERGENCY MEDICINE

## 2025-02-01 PROCEDURE — 84484 ASSAY OF TROPONIN QUANT: CPT | Performed by: EMERGENCY MEDICINE

## 2025-02-01 RX ORDER — ONDANSETRON 4 MG/1
4 TABLET, ORALLY DISINTEGRATING ORAL EVERY 4 HOURS PRN
Qty: 10 TABLET | Refills: 0 | Status: SHIPPED | OUTPATIENT
Start: 2025-02-01 | End: 2025-02-08

## 2025-02-01 RX ORDER — MECLIZINE HYDROCHLORIDE 25 MG/1
25 TABLET ORAL ONCE
Status: DISCONTINUED | OUTPATIENT
Start: 2025-02-01 | End: 2025-02-01

## 2025-02-01 RX ORDER — ACETAMINOPHEN 500 MG
1000 TABLET ORAL ONCE
Status: DISCONTINUED | OUTPATIENT
Start: 2025-02-01 | End: 2025-02-01

## 2025-02-01 RX ORDER — MECLIZINE HYDROCHLORIDE 25 MG/1
25 TABLET ORAL 3 TIMES DAILY PRN
Qty: 10 TABLET | Refills: 0 | Status: SHIPPED | OUTPATIENT
Start: 2025-02-01

## 2025-02-01 NOTE — ED PROVIDER NOTES
Patient Seen in: [facility] Emergency Department      History     Chief Complaint   Patient presents with    Headache    Eye Visual Problem     Stated Complaint: Headache; Unilateral Vision Change    Subjective:   HPI  69 yoF with HTN who presents for evaluation of multiple symptoms.  For the past 48 hours she has had a headache at the top of her head.  She also has dizziness described as feeling off balance when she turns her head to the left.  She feels that her left eye vision is blurry and that bright lights make the blurry vision worse.  She also notes that she does have a cataract in the left eye and is planning surgery next month.  Despite triage note she denies any numbness tingling or weakness in the arms or legs.  No slurred speech or facial droop.  No chest pain or dyspnea.  She did have some palpitations with that imbalance yesterday.  No leg swelling.  No ripping or tearing sensation in the chest or back.        Objective:     Past Medical History:    Back problem    Chronic back pain    Essential hypertension    High blood pressure    Trigger thumb, right thumb    Tubular adenoma of colon    repeat CLN in 2025    Visual impairment              Past Surgical History:   Procedure Laterality Date    Colonoscopy N/A 11/11/2020    Procedure: COLONOSCOPY;  Surgeon: LUZ ELENA Reyes MD;  Location: [facility] ENDOSCOPY    Colonoscopy N/A 08/15/2024    Procedure: COLONOSCOPY;  Surgeon: Les Red MD;  Location: [facility] MAIN OR    Hysterectomy      Incise finger tendon sheath Right 01/26/2024    trigger release right thumb    Skin surgery  08/13/2024    exc lipoma chest and back                Social History     Socioeconomic History    Marital status:    Tobacco Use    Smoking status: Never    Smokeless tobacco: Never   Vaping Use    Vaping status: Never Used   Substance and Sexual Activity    Alcohol use: Never    Drug use: Never   Other Topics Concern    Caffeine Concern No    Exercise Yes    Social History Narrative    The patient does not use an assistive device..      The patient does live in a home with stairs.     Social Drivers of Health      [facility] from [facility], [facility]    Social Connections    Received from [facility], [facility]    Housing Stability                  Physical Exam     ED Triage Vitals [02/01/25 1609]   /87   Pulse 102   Resp 21   Temp 97.6 °F (36.4 °C)   Temp src Temporal   SpO2 97 %   O2 Device None (Room air)       Current Vitals:   Vital Signs  BP: 151/90  Pulse: 66  Resp: 19  Temp: 97.6 °F (36.4 °C)  Temp src: Temporal  MAP (mmHg): (!) 104    Oxygen Therapy  SpO2: 97 %  O2 Device: None (Room air)        Physical Exam  Vitals and nursing note reviewed.   Constitutional:       Appearance: She is well-developed.   HENT:      Head: Normocephalic and atraumatic.      Mouth/Throat:      Mouth: Mucous membranes are moist.      Pharynx: Oropharynx is clear.   Eyes:      Extraocular Movements: Extraocular movements intact.      Pupils: Pupils are equal, round, and reactive to light.   Neck:      Vascular: No carotid bruit.   Cardiovascular:      Rate and Rhythm: Normal rate and regular rhythm.      Heart sounds: Normal heart sounds.   Pulmonary:      Effort: Pulmonary effort is normal.      Breath sounds: Normal breath sounds.   Abdominal:      General: There is no distension.      Palpations: Abdomen is soft.      Tenderness: There is no abdominal tenderness.   Musculoskeletal:         General: Normal range of motion.      Cervical back: Normal range of motion and neck supple. No tenderness.   Skin:     General: Skin is warm.      Capillary Refill: Capillary refill takes less than 2 seconds.   Neurological:      General: No focal deficit present.      Mental Status: She is alert.      Cranial Nerves: No cranial nerve deficit.      Sensory: No sensory deficit.      Motor: No weakness.       Coordination: Coordination normal.      Gait: Gait normal.      Comments: No focal deficits, NIHSS 0       Differential diagnosis includes but is not limited to CVA, cervical artery dissection, complicated migraine, arrhythmia    ED Course     Labs Reviewed   CBC WITH DIFFERENTIAL WITH PLATELET - Abnormal; Notable for the following components:       Result Value    RDW-SD 46.5 (*)     All other components within normal limits   COMP METABOLIC PANEL (14) - Abnormal; Notable for the following components:    Sodium 146 (*)     Calculated Osmolality 303 (*)     All other components within normal limits   PTT, ACTIVATED - Normal   TROPONIN I HIGH SENSITIVITY - Normal   PROTHROMBIN TIME (PT) - Normal   RAINBOW DRAW LAVENDER   RAINBOW DRAW LIGHT GREEN   RAINBOW DRAW GOLD   RAINBOW DRAW BLUE     EKG    Rate, intervals and axes as noted on EKG Report.  Rate: 104  Rhythm: Sinus Rhythm  Reading: No STEMI, Q-wave in V2, abnormal EKG without prior for comparison                MRI BRAIN WO ACUTE (3) SEQUENCE (CPT=70551)    Result Date: 2/1/2025  CONCLUSION: No acute intracranial abnormality.  No evidence of acute or subacute ischemia or infarct.  Mild chronic microvascular ischemic changes.    Dictated by (CST): Hong Philip MD on 2/01/2025 at 7:29 PM     Finalized by (CST): Hogn Philip MD on 2/01/2025 at 7:32 PM          CTA BRAIN + CTA CAROTIDS (CPT=70496/58992)    Result Date: 2/1/2025  CONCLUSION:   1. No acute intracranial abnormality.  2. No hemodynamically significant stenosis in the major cervical and intracranial arteries.  No aneurysms or AVMs.  3. The major intracranial veins and venous sinuses are grossly normal.  4. Additional chronic or incidental findings are described in the body of this report.     Dictated by (CST): Hong Philip MD on 2/01/2025 at 7:13 PM     Finalized by (CST): Hong Philip MD on 2/01/2025 at 7:23 PM                MDM              Medical Decision Making  Vitals are stable in the ED.   Her NIHSS is 0.  CMP and CBC are unremarkable.  Per my independent interpretation of MRI brain, no acute infarct.  CTA brain and neck without dissection or large vessel occlusion.  She was treated with Tylenol and meclizine.  On my reevaluation she feels much better.  Dizziness is resolved.  Will treat I do feel patient is stable for discharge home with short course of meclizine as needed.  Advised that she follow-up with her ophthalmologist given her left eye vision changes and she does have appointment in 2 days.  Return precautions provided and discussed and she is comfortable with the plan.    Problems Addressed:  Dizziness: complicated acute illness or injury with systemic symptoms  Vision changes: complicated acute illness or injury with systemic symptoms    Amount and/or Complexity of Data Reviewed  Labs: ordered. Decision-making details documented in ED Course.  Radiology: ordered and independent interpretation performed. Decision-making details documented in ED Course.  ECG/medicine tests: ordered and independent interpretation performed. Decision-making details documented in ED Course.    Risk  Prescription drug management.  Decision regarding hospitalization.        Disposition and Plan     Clinical Impression:  1. Dizziness    2. Vision changes         Disposition:  Discharge  2/1/2025  8:11 pm    Follow-up:  follow up with your opthalmologist regarding your cataract    Follow up      We recommend that you schedule follow up care with a primary care provider within the next three months to obtain basic health screening including reassessment of your blood pressure.      Medications Prescribed:  Discharge Medication List as of 2/1/2025  8:14 PM        START taking these medications    Details   meclizine 25 MG Oral Tab Take 1 tablet (25 mg total) by mouth 3 (three) times daily as needed., Normal, Disp-10 tablet, R-0      ondansetron 4 MG Oral Tablet Dispersible Take 1 tablet (4 mg total) by mouth every 4  (four) hours as needed for Nausea., Normal, Disp-10 tablet, R-0                 Supplementary Documentation:

## 2025-02-01 NOTE — ED INITIAL ASSESSMENT (HPI)
Patient c/o a headache that woke her up Thursday night and then state she got visual changes in her left eye. + Blurry vision and double vision. Then last night she felt palpitations. +Right arm weakness

## 2025-02-02 LAB
ATRIAL RATE: 104 BPM
P AXIS: 60 DEGREES
P-R INTERVAL: 204 MS
Q-T INTERVAL: 328 MS
QRS DURATION: 84 MS
QTC CALCULATION (BEZET): 431 MS
R AXIS: 21 DEGREES
T AXIS: 40 DEGREES
VENTRICULAR RATE: 104 BPM

## 2025-02-02 NOTE — DISCHARGE INSTRUCTIONS
Use the meclizine as needed for dizziness.  Do not drive or operate heavy machinery while taking this.  Please follow-up with your eye specialist regarding your left eye blurry vision.

## 2025-02-03 ENCOUNTER — TELEPHONE (OUTPATIENT)
Dept: FAMILY MEDICINE CLINIC | Facility: CLINIC | Age: 70
End: 2025-02-03

## 2025-02-03 ENCOUNTER — PATIENT OUTREACH (OUTPATIENT)
Dept: CASE MANAGEMENT | Age: 70
End: 2025-02-03

## 2025-02-03 NOTE — TELEPHONE ENCOUNTER
Sent as FYI/LUBA protocol:    Spoke with patient for Transitions of Care call today. Offered LUBA/ER f/u appt with PCP/partners--pt declines. Pt confirms ophthalmology appt tomorrow, as previously scheduled and will keep 6/24/25 appt with PCP.      ER Follow-up appointment needed by 2/08/25.      BOOK BY DATE: 2/15/25             Disposition and Plan      Clinical Impression:  1. Dizziness    2. Vision changes        Follow-up Appointments:    Follow-Ups: Follow up with follow up with your opthalmologist regarding your cataract      FEB 7 2025 10:15 AM - Appointment  [facility] [place] [facility] [address] - [place], [place]       FEB 11 2025 11:45 AM - Appointment  [facility] [address]  Tony Jimenez; Girma Vega MD     FEB 18 2025 11:45 AM - Appointment  [facility] [place] [facility] [address]  [place] Jimenez; Girma Vega MD     FEB 26 2025 01:30 PM - Appointment  [facility] [place] [address] - [place], [place]; Girma Vega MD     MAR 3  2025 11:45 AM - Appointment  [facility] [place] [facility] [address]  Girma Vega MD; Tony Jiemnez      Your appointments       Date & Time Appointment Department ([place])    Jun 24, 2025 11:00 AM CDT MA Supervisit with Evelio Patterson DO [facility] ([facility])

## 2025-02-03 NOTE — PROGRESS NOTES
Transitions of Care Navigation  Discharge Date: 25  Contact Date: 2/3/2025       Disposition and Plan      Clinical Impression:  1. Dizziness    2. Vision changes        Transitions of Care Assessment:  LUBA Initial Assessment    General:  Assessment completed with: Patient  Patient Subjective: Pt feeling better, since ER discharge--taking Meclizine and Zofran, as needed, appetite good, staying hydrated, independent with ADLs. Pt denies fever, chills, headache, vision changes, dizziness, nausea, vomiting, diarrhea, bleeding, irregular heartbeat or fast pulse, loss of vision, speech or strength or coordination in any body part, calf pain or swelling, chest pain or shortness of breath at this time--speaking in full, clear sentences.  Chief Complaint: Clinical Impression:  1. Dizziness   2. Vision changes  Verify patient name and  with patient/ caregiver: Yes    Hospital Stay/Discharge:  Tell me what you understand of why you were in the hospital or emergency department: Patient c/o a headache that woke her up Thursday night and then state she got visual changes in her left eye. + Blurry vision and double vision. Then last night she felt palpitations. +Right arm weakness  Prior to leaving the hospital were your Discharge Instructions reviewed with you?: Yes  Did you receive a copy of your written Discharge Instructions?: Yes  What questions do you have about your Discharge Instructions?: none  Do you feel better or worse since you left the hospital or emergency department?: Better    Follow - Up Appointment:  Do you have a follow-up appointment?: No  Are there any barriers to getting to your follow-up appointment?: No    Home Health/DME:  Prior to leaving the hospital was Home Health (HH) arranged for you?: No     Prior to leaving the hospital or emergency department was Durable Medical Equipment (DME), medical supplies, or infusions arranged for you?: No  Are DME/medical supply/infusions needs identified by  staff during this assessment?: No     Medications/Diet:  Did any of your medications change, during or after your hospital stay or ED visit?: Yes  Do you have your new or updated medications?: Yes  Do you understand what your medications are for and possible side effects?: Yes  Are there any reasons that keep you from taking your medication as prescribed?: No  Any concerns about medication refills?: No    Were you given a different diet per your Discharge Instructions?: No  Reason: not required     Questions/Concerns:  Do you have any questions or concerns that have not been discussed?: No     LUBA Follow-up Assessment    General:  Assessment completed with: Patient  Patient Subjective: Pt feeling better, since ER discharge--taking Meclizine and Zofran, as needed, appetite good, staying hydrated, independent with ADLs. Pt denies fever, chills, headache, vision changes, dizziness, nausea, vomiting, diarrhea, bleeding, irregular heartbeat or fast pulse, loss of vision, speech or strength or coordination in any body part, calf pain or swelling, chest pain or shortness of breath at this time--speaking in full, clear sentences.  Chief Complaint: Clinical Impression:  1. Dizziness   2. Vision changes  Community Resources: Other (none)    Progress/Care Plan:  Is the patient progressing as planned?: Yes  Frequency/Follow Up Plan: Patient has met goals, no further outreach needed.     Nursing Interventions:  Discussed diet, activity, medications and need for f/u visits. Offered LUBA/ER f/u appt with PCP/partners--pt declines. Pt confirms ophthalmology appt tomorrow, as previously scheduled and will keep 6/24/25 appt with PCP. Patient has met goals, no further outreach needed.   Sent TE to office staff as FYI/LUBA protocol.  Patient aware when to contact PCP/specialists and when to seek emergency care. No further questions/concerns at this time.      Medications:  Medication Reconciliation:  I am aware of an inpatient discharge  within the last 30 days.  The discharge medication list has been reconciled with the patient's current medication list and reviewed by me. See medication list for additions of new medication, and changes to current doses of medications and discontinued medications.  Current Outpatient Medications   Medication Sig Dispense Refill    meclizine 25 MG Oral Tab Take 1 tablet (25 mg total) by mouth 3 (three) times daily as needed. 10 tablet 0    ondansetron 4 MG Oral Tablet Dispersible Take 1 tablet (4 mg total) by mouth every 4 (four) hours as needed for Nausea. 10 tablet 0    levocetirizine 5 MG Oral Tab Take 1 tablet (5 mg total) by mouth every evening. 20 tablet 0    Azelastine HCl 0.05 % Ophthalmic Solution Apply 1 drop to eye 2 (two) times daily. 1 each 0    erythromycin 5 MG/GM Ophthalmic Ointment Apply 1 Application to eye nightly. 1 each 0    amLODIPine 5 MG Oral Tab Take 1 tablet (5 mg total) by mouth daily. 90 tablet 3    vitamin B-12 50 MCG Oral Tab Take 1 tablet (50 mcg total) by mouth daily.      Cholecalciferol (VITAMIN D) 50 MCG (2000 UT) Oral Cap Take by mouth.      vitamin E 100 UNITS Oral Cap Take 1 capsule (100 Units total) by mouth daily.      Vitamin C 500 MG Oral Tab Take 1 tablet (500 mg total) by mouth daily.      omega-3 Oral Liquid Take by mouth daily.      Zinc 30 MG Oral Cap Take by mouth.      Magnesium 200 MG Oral Tab Take by mouth.      GARLIC OR Take 1 tablet by mouth daily.      Misc Natural Products (GLUCOSAMINE CHONDROITIN ADV OR) Take 1 tablet by mouth daily.      Coenzyme Q10 (CO Q-10 OR) Take 1 tablet by mouth daily.      Lactobacillus (PROBIATA OR) Take by mouth.              START taking these medications     Details   meclizine 25 MG Oral Tab Take 1 tablet (25 mg total) by mouth 3 (three) times daily as needed., Normal, Disp-10 tablet, R-0       ondansetron 4 MG Oral Tablet Dispersible Take 1 tablet (4 mg total) by mouth every 4 (four) hours as needed for Nausea., Normal, Disp-10  tablet, R-0           Follow-up Appointments:    Follow-Ups: Follow up with follow up with your opthalmologist regarding your cataract      FEB 7 2025 10:15 AM - Appointment  [facility] Tony Jimenez       FEB 11 2025 11:45 AM - Appointment  [facility] Tony Jimenez; Girma Vega MD     FEB 18 2025 11:45 AM - Appointment  [facility] Tony Jimenez; Girma Vega MD     FEB 26 2025 01:30 PM - Appointment  [facility] Tony Jimenez; Girma Vega MD     MAR 3  2025 11:45 AM - Appointment  [facility] Girma Vega MD; Tony Jimenez      Your appointments       Date & Time Appointment Department ([place])    Jun 24, 2025 11:00 AM CDT MA Supervisit with Evelio Patterson DO [facility] ([facility])              [address] [address]  [phone]            Transitional Care Clinic  Was TCC Ordered: No    Primary Care Provider (If no TCC appointment)  Does patient already have a PCP appointment scheduled? No  Nurse Care Manager Attempted to schedule PCP office ER Follow-up appointment with patient   -If no appointment scheduled: Explain pt declines    Specialist  Does the patient have any other follow-up appointment(s) need to be scheduled? No     Book By Date: 2/08/2025

## 2025-02-03 NOTE — PROGRESS NOTES
Left message on mailbox for patient to call nurse care manager back for transitions of care call.  Nurse care  information [phone] included in message.       Disposition and Plan      Clinical Impression:  1. Dizziness    2. Vision changes            START taking these medications     Details   meclizine 25 MG Oral Tab Take 1 tablet (25 mg total) by mouth 3 (three) times daily as needed., Normal, Disp-10 tablet, R-0       ondansetron 4 MG Oral Tablet Dispersible Take 1 tablet (4 mg total) by mouth every 4 (four) hours as needed for Nausea., Normal, Disp-10 tablet, R-0           Follow-up Appointments:    Follow-Ups: Follow up with follow up with your opthalmologist regarding your cataract     FEB 7 2025 10:15 AM - Appointment  [facility] [place] [address]  Tony Jimenez      FEB 11 2025 11:45 AM - Appointment  [facility] Jim[facility]; Girma Vega MD     FEB 18 2025 11:45 AM - Appointment  [facility] [place] Tony Jimenez; Girma Vega MD     FEB 26 2025 01:30 PM - Appointment  [facility] [place] [address]  Tony Jimenez; Girma Vega MD     MAR 3  2025 11:45 AM - Appointment  [facility] Girma Vega [facility]; Tony Jimenez      Your appointments       Date & Time Appointment Department ([place])    Jun 24, 2025 11:00 AM CDT MA Supervisit with Evelio Patterson DO [facility] ([facility] [place] [facility]              [address] [id number] [phone]

## 2025-02-14 ENCOUNTER — PATIENT MESSAGE (OUTPATIENT)
Dept: FAMILY MEDICINE CLINIC | Facility: CLINIC | Age: 70
End: 2025-02-14

## 2025-02-16 NOTE — TELEPHONE ENCOUNTER
Yes this patient needs preoperative examination/testing unless this has been arranged through the hospital via the ophthalmologist.

## 2025-02-17 ENCOUNTER — TELEPHONE (OUTPATIENT)
Dept: FAMILY MEDICINE CLINIC | Facility: CLINIC | Age: 70
End: 2025-02-17

## 2025-02-17 NOTE — TELEPHONE ENCOUNTER
Patient states that she is scheduled to have cataract eye surgery on 4-14-25 and needs a pre op clearance. The first available appointment with Dr. Patterson is not until 5-6-25 and patient would like to be seen sooner. Would the doctor like to add the patient to the schedule? If so, which date and time?    Please reply to pool: EM CC IM FM ALG RHE [[phone]]

## 2025-02-21 ENCOUNTER — APPOINTMENT (OUTPATIENT)
Dept: GENERAL RADIOLOGY | Age: 70
End: 2025-02-21
Attending: NURSE PRACTITIONER
Payer: MEDICARE

## 2025-02-21 ENCOUNTER — NURSE TRIAGE (OUTPATIENT)
Dept: FAMILY MEDICINE CLINIC | Facility: CLINIC | Age: 70
End: 2025-02-21

## 2025-02-21 ENCOUNTER — HOSPITAL ENCOUNTER (OUTPATIENT)
Age: 70
Discharge: HOME | End: 2025-02-21
Payer: MEDICARE

## 2025-02-21 VITALS
HEART RATE: 85 BPM | OXYGEN SATURATION: 100 % | SYSTOLIC BLOOD PRESSURE: 147 MMHG | DIASTOLIC BLOOD PRESSURE: 87 MMHG | TEMPERATURE: 99 F | RESPIRATION RATE: 20 BRPM

## 2025-02-21 DIAGNOSIS — M25.552 LEFT HIP PAIN: Primary | ICD-10-CM

## 2025-02-21 PROCEDURE — 99213 OFFICE O/P EST LOW 20 MIN: CPT | Performed by: NURSE PRACTITIONER

## 2025-02-21 PROCEDURE — 73502 X-RAY EXAM HIP UNI 2-3 VIEWS: CPT | Performed by: NURSE PRACTITIONER

## 2025-02-21 RX ORDER — NAPROXEN 500 MG/1
500 TABLET ORAL 2 TIMES DAILY PRN
Qty: 20 TABLET | Refills: 0 | Status: SHIPPED | OUTPATIENT
Start: 2025-02-21 | End: 2025-03-03

## 2025-02-21 RX ORDER — LIDOCAINE 4 G/G
1 PATCH TOPICAL EVERY 24 HOURS
Qty: 7 PATCH | Refills: 0 | Status: SHIPPED | OUTPATIENT
Start: 2025-02-21 | End: 2025-02-28

## 2025-02-21 NOTE — TELEPHONE ENCOUNTER
Action Requested: Summary for Provider     []  Critical Lab, Recommendations Needed  [] Need Additional Advice  [x]   FYI    []   Need Orders  [] Need Medications Sent to Pharmacy  []  Other     SUMMARY: Patient will proceed to [facility] now.     Reason for call: Hip Pain  Onset: Feb 21, 2025    Spoke to patient, full name and date of birth verified.   Patient reports difficulty walking.   Patient came downstairs this morning, sat down to put on her shoes.   When patient stood back up, she experienced sharp pain in her left hip, rated \"10/10\".     Patient stated she had to sit in a class for 5 hours yesterday with only one break - she was feeling very stiff yesterday.     No available appointments at [place] today - patient stated she will proceed to [facility] as she is familiar with the location.     RN recommended using 's cane for safety and patient agreed with plan of care.     Reason for Disposition   SEVERE pain (e.g., excruciating, unable to do any normal activities)    Protocols used: Hip Pain-A-[place]

## 2025-02-21 NOTE — ED INITIAL ASSESSMENT (HPI)
Pt here with complaints of left hip pain , pt states she bent down to put her boots on and felt a sharp pain to her left hip, pt states pain radiates across her left groin pt states denies any injury, pt has limited rom

## 2025-02-21 NOTE — ED PROVIDER NOTES
Patient Seen in: [facility]      History     Chief Complaint   Patient presents with    Hip Pain     Stated Complaint: Pain in left hip    Subjective:   HPI  Patient is a 69-year-old female who presents to the immediate care center with concern for pain to the left hip.  This is isolated to that area.  She was leaning over to put books on this morning and felt sharp pain immediately in the area.  She has been able to ambulate without difficulty with a cane to support her gait.            Objective:     Past Medical History:    Back problem    Chronic back pain    Essential hypertension    High blood pressure    Trigger thumb, right thumb    Tubular adenoma of colon    repeat CLN in 2025    Visual impairment              Past Surgical History:   Procedure Laterality Date    Colonoscopy N/A 11/11/2020    Procedure: COLONOSCOPY;  Surgeon: LUZ ELENA Reyes MD;  Location: [facility] ENDOSCOPY    Colonoscopy N/A 08/15/2024    Procedure: COLONOSCOPY;  Surgeon: Les Red MD;  Location: [facility] MAIN OR    Hysterectomy      Incise finger tendon sheath Right 01/26/2024    trigger release right thumb    Skin surgery  08/13/2024    exc lipoma chest and back                Social History     Socioeconomic History    Marital status:    Tobacco Use    Smoking status: Never    Smokeless tobacco: Never   Vaping Use    Vaping status: Never Used   Substance and Sexual Activity    Alcohol use: Never    Drug use: Never   Other Topics Concern    Caffeine Concern No    Exercise Yes   Social History Narrative    The patient does not use an assistive device..      The patient does live in a home with stairs.     Social Drivers of Health     Transportation Needs: No Transportation Needs (2/3/2025)    Transportation Needs     Lack of Transportation: No    [facility]    Housing Stability              Review of Systems   Constitutional: Negative.     Gastrointestinal:  Negative for abdominal distention.   Musculoskeletal:  Positive for arthralgias. Negative for back pain.   Neurological:  Negative for weakness and numbness.       Positive for stated complaint: Pain in left hip  Other systems are as noted in HPI.  Constitutional and vital signs reviewed.      All other systems reviewed and negative except as noted above.    Physical Exam     ED Triage Vitals [02/21/25 1251]   /87   Pulse 85   Resp 20   Temp 98.7 °F (37.1 °C)   Temp src Oral   SpO2 100 %   O2 Device None (Room air)       Current Vitals:   Vital Signs  BP: 147/87  Pulse: 85  Resp: 20  Temp: 98.7 °F (37.1 °C)  Temp src: Oral    Oxygen Therapy  SpO2: 100 %  O2 Device: None (Room air)        Physical Exam  Vitals and nursing note reviewed.   Constitutional:       General: She is not in acute distress.  Pulmonary:      Effort: Pulmonary effort is normal. No respiratory distress.   Musculoskeletal:      Thoracic back: No tenderness.      Lumbar back: No tenderness.      Right hip: Normal.      Left hip: Tenderness present.      Left upper leg: Normal.   Skin:     General: Skin is warm and dry.   Neurological:      Mental Status: She is alert and oriented to person, place, and time.      Sensory: No sensory deficit.      Gait: Gait normal.   Psychiatric:         Behavior: Behavior normal.             ED Course   Labs Reviewed - No data to display  XR HIP W OR WO PELVIS 2 OR 3 VIEWS, LEFT (CPT=73502)   Final Result   PROCEDURE: XR HIP W OR WO PELVIS 2 OR 3 VIEWS, LEFT (CPT=73502)       COMPARISON: [facility], XR HIP W OR WO PELVIS 2    OR 3 VIEWS, RIGHT (CPT=73502), 6/01/2022, 11:28 AM.       INDICATIONS: Left hip pain for couple of days. No injury or trauma.       TECHNIQUE: 3 views were obtained.         FINDINGS:    BONES: Mild-moderate left and mild right hip osteoarthritis.  No    suspicious bone lesion or fracture.  Degenerative changes in lower lumbar    spine and  pubic symphysis.   SOFT TISSUES: A stable calcification in the soft tissues projecting medial    to the left femoral neck on the AP view..    EFFUSION: None visible.    OTHER: Negative.                    =====   CONCLUSION:    1. Osteoarthritis.  No acute finding.               Dictated by (CST): Chris Ponce MD on 2/21/2025 at 2:33 PM        Finalized by (CST): Chris Ponce MD on 2/21/2025 at 2:35 PM                 Patient arrived using her 's walker.  She was fitted with one of her own here to support her gait.              MDM      Reviewed results of xray; discussed that despite no obvious fx/dislocation, there is potential for serious ligamentous injury that could possibly require intervention. Pt was advised that they MUST f/u 5-7 days if pain persists for further evaluation and treatment.           Medical Decision Making  Differential diagnoses considered today include, but are not exclusive of: fracture, dislocation, strain, sprain, vascular compromise, and nerve impingement syndrome.      Problems Addressed:  Left hip pain: self-limited or minor problem    Amount and/or Complexity of Data Reviewed  Radiology:  Decision-making details documented in ED Course.    Risk  OTC drugs.  Prescription drug management.        Disposition and Plan     Clinical Impression:  1. Left hip pain         Disposition:  Discharge  2/21/2025  2:41 pm    Follow-up:  Evelio Patterson, DO  [address] [place] [id number] [phone]    Schedule an appointment as soon as possible for a visit in 1 week            Medications Prescribed:  Current Discharge Medication List        START taking these medications    Details   naproxen 500 MG Oral Tab Take 1 tablet (500 mg total) by mouth 2 (two) times daily as needed.  Qty: 20 tablet, Refills: 0      lidocaine 4 % External Patch Place 1 patch onto the skin daily for 7 days.  Qty: 7 patch, Refills: 0                 Supplementary Documentation:

## 2025-02-25 ENCOUNTER — OFFICE VISIT (OUTPATIENT)
Dept: FAMILY MEDICINE CLINIC | Facility: CLINIC | Age: 70
End: 2025-02-25

## 2025-02-25 VITALS
TEMPERATURE: 98 F | OXYGEN SATURATION: 97 % | SYSTOLIC BLOOD PRESSURE: 144 MMHG | DIASTOLIC BLOOD PRESSURE: 71 MMHG | HEART RATE: 78 BPM | WEIGHT: 158 LBS | BODY MASS INDEX: 28 KG/M2 | RESPIRATION RATE: 18 BRPM

## 2025-02-25 DIAGNOSIS — M16.0 PRIMARY OSTEOARTHRITIS OF HIPS, BILATERAL: ICD-10-CM

## 2025-02-25 DIAGNOSIS — R30.9 PAINFUL URINATION: Primary | ICD-10-CM

## 2025-02-25 DIAGNOSIS — Z28.21 INFLUENZA VACCINATION DECLINED BY PATIENT: ICD-10-CM

## 2025-02-25 DIAGNOSIS — R10.31 RIGHT LOWER QUADRANT ABDOMINAL PAIN: ICD-10-CM

## 2025-02-25 DIAGNOSIS — M25.552 ACUTE HIP PAIN, LEFT: ICD-10-CM

## 2025-02-25 DIAGNOSIS — Z28.21 PNEUMOCOCCAL VACCINATION DECLINED BY PATIENT: ICD-10-CM

## 2025-02-25 DIAGNOSIS — Z87.19 HISTORY OF DIVERTICULOSIS: ICD-10-CM

## 2025-02-25 LAB
APPEARANCE: CLEAR
BILIRUBIN: NEGATIVE
GLUCOSE (URINE DIPSTICK): NEGATIVE MG/DL
KETONES (URINE DIPSTICK): NEGATIVE MG/DL
MULTISTIX LOT#: ABNORMAL NUMERIC
NITRITE, URINE: NEGATIVE
PH, URINE: 6 (ref 4.5–8)
PROTEIN (URINE DIPSTICK): NEGATIVE MG/DL
SPECIFIC GRAVITY: 1.01 (ref 1–1.03)
URINE-COLOR: YELLOW
UROBILINOGEN,SEMI-QN: 0.2 MG/DL (ref 0–1.9)

## 2025-02-25 PROCEDURE — 3077F SYST BP >= 140 MM HG: CPT | Performed by: FAMILY MEDICINE

## 2025-02-25 PROCEDURE — 3078F DIAST BP <80 MM HG: CPT | Performed by: FAMILY MEDICINE

## 2025-02-25 PROCEDURE — 1125F AMNT PAIN NOTED PAIN PRSNT: CPT | Performed by: FAMILY MEDICINE

## 2025-02-25 PROCEDURE — 81003 URINALYSIS AUTO W/O SCOPE: CPT | Performed by: FAMILY MEDICINE

## 2025-02-25 PROCEDURE — 99214 OFFICE O/P EST MOD 30 MIN: CPT | Performed by: FAMILY MEDICINE

## 2025-02-25 NOTE — PROGRESS NOTES
Subjective:     Patient ID: Lela Cannon is a 69 year old female.    This patient is a well-established 69-year-old extremely physically active -American female who reports experiencing a sharp pain in the left hip which occurred after rising up from tying her shoes, this occurred on Friday, 02/21/2025.  Unfortunately, this incapacitated her and she was not able to ambulate without assistance-had to use a walker.  The patient was seen in the urgent care setting.    X-rays revealed bilateral mild to moderate hip osteoarthritis.  She does state that there is soreness in the right hip as well.    The patient has described workout.  It is relatively aggressive for 69-year-old.  She uses the flexible band to workout and she is literally stretching and flexing her hips in a cross body format.    Patient has been encouraged to continue with her workouts, but she needs to modify what she is doing in order to maintain the benefit and decrease injury frequency.    The patient also gives a report for several months of right lower quadrant discomfort.  It is of a random occurrence 5/10.  There is consistency of relief with bowel movements.  There is no report of blood in the stool, nor is the stool dark or maroon-colored.  The patient does have a history for diverticulosis/diverticulitis.    Patient also reports a discomfort with urination which just started within the last 24 hours.  There is no blood in the urine.  This discomfort does not correlate with the abdominal discomfort that she reported that is relieved with bowel movements.        History/Other:   Review of Systems  Current Outpatient Medications   Medication Sig Dispense Refill    amLODIPine 5 MG Oral Tab Take 1 tablet (5 mg total) by mouth daily. 90 tablet 3    vitamin B-12 50 MCG Oral Tab Take 1 tablet (50 mcg total) by mouth daily.      Cholecalciferol (VITAMIN D) 50 MCG (2000 UT) Oral Cap Take by mouth.      omega-3 Oral Liquid Take by mouth  daily.      Zinc 30 MG Oral Cap Take by mouth.      Magnesium 200 MG Oral Tab Take by mouth.      GARLIC OR Take 1 tablet by mouth daily.      Coenzyme Q10 (CO Q-10 OR) Take 1 tablet by mouth daily.      Lactobacillus (PROBIATA OR) Take by mouth.      naproxen 500 MG Oral Tab Take 1 tablet (500 mg total) by mouth 2 (two) times daily as needed. (Patient not taking: Reported on 2/25/2025) 20 tablet 0    lidocaine 4 % External Patch Place 1 patch onto the skin daily for 7 days. (Patient not taking: Reported on 2/25/2025) 7 patch 0    meclizine 25 MG Oral Tab Take 1 tablet (25 mg total) by mouth 3 (three) times daily as needed. (Patient not taking: Reported on 2/21/2025) 10 tablet 0    levocetirizine 5 MG Oral Tab Take 1 tablet (5 mg total) by mouth every evening. 20 tablet 0    erythromycin 5 MG/GM Ophthalmic Ointment Apply 1 Application to eye nightly. (Patient not taking: Reported on 2/21/2025) 1 each 0    vitamin E 100 UNITS Oral Cap Take 1 capsule (100 Units total) by mouth daily.      Vitamin C 500 MG Oral Tab Take 1 tablet (500 mg total) by mouth daily.      Misc Natural Products (GLUCOSAMINE CHONDROITIN ADV OR) Take 1 tablet by mouth daily. (Patient not taking: Reported on 2/25/2025)       Allergies:Allergies[1]    Past Medical History:    Back problem    Chronic back pain    Essential hypertension    High blood pressure    Trigger thumb, right thumb    Tubular adenoma of colon    repeat CLN in 2025    Visual impairment      Past Surgical History:   Procedure Laterality Date    Colonoscopy N/A 11/11/2020    Procedure: COLONOSCOPY;  Surgeon: LUZ ELENA Reyes MD;  Location: [facility] ENDOSCOPY    Colonoscopy N/A 08/15/2024    Procedure: COLONOSCOPY;  Surgeon: Les Red MD;  Location: [facility] MAIN OR    Hysterectomy      Incise finger tendon sheath Right 01/26/2024    trigger release right thumb    Skin surgery  08/13/2024    exc lipoma chest and back      No family history on file.   Social History:   Social  History     Socioeconomic History    Marital status:    Tobacco Use    Smoking status: Never    Smokeless tobacco: Never   Vaping Use    Vaping status: Never Used   Substance and Sexual Activity    Alcohol use: Never    Drug use: Never   Other Topics Concern    Caffeine Concern No    Exercise Yes   Social History Narrative    The patient does not use an assistive device..      The patient does live in a home with stairs.     Social Drivers of Health     Transportation Needs: No Transportation Needs (2/3/2025)    Transportation Needs     Lack of Transportation: No    [facility]    Housing Stability        Objective:   Vitals:    02/25/25 1056   BP: 144/71   Pulse: 78   Resp: 18   Temp: 97.9 °F (36.6 °C)       Physical Exam  Constitutional:       General: She is not in acute distress.     Appearance: Normal appearance. She is not ill-appearing.   Cardiovascular:      Rate and Rhythm: Normal rate and regular rhythm.      Heart sounds:      No gallop.   Pulmonary:      Effort: Pulmonary effort is normal.      Breath sounds: Normal breath sounds.   Abdominal:          Comments: Region of recurring discomfort as depicted-subjective and not examined.   Musculoskeletal:      Comments: Patient is now able to ambulate without the assistance of a walker.  Soreness to the right hip; acute discomfort and minimize range of motion of the left hip has resolved-subjective and not examined.   Neurological:      Mental Status: She is alert and oriented to person, place, and time.   Psychiatric:         Mood and Affect: Mood normal.         Assessment & Plan:   1. Acute hip pain, left  For the patient this complaint is in resolution.  Pain management as deemed necessary.  We can consider orthopedic consultation.    2. Primary osteoarthritis of hips, bilateral  Consider orthopedic consultation.    3. Right lower quadrant abdominal pain  Per the patient this is  intermittent and relieved by movements.  Colonoscopy is.  Patient counseled on food intake in lieu of her diverticulosis.    4. History of diverticulosis  See #3.    5. Painful urination  Months order the following to rule out UTI.  - POC Urinalysis, Automated Dip without microscopy (PCA and EMMG ONLY) [14916]  - Urine Culture, Routine [E]; Future  - Urine Culture, Routine [E]    6. Pneumococcal vaccination declined by patient  Declined by patient.  - Prevnar 20 (PCV20) [84739]    7. Influenza vaccination declined by patient  Declined by patient.  - High Dose Fluzone trivalent influenza, 65yrs+ PFS (86976)        No orders of the defined types were placed in this encounter.      Meds This Visit:  Requested Prescriptions      No prescriptions requested or ordered in this encounter       Imaging & Referrals:  PCV20 VACCINE FOR INTRAMUSCULAR USE  INFLUENZA VAC HIGH DOSE PRSV FREE     Patient Instructions   Patient has been given advisement to modify her workouts to maintain benefit and minimize injury.  Simple stretching prior to extensive workout emphasized.  Increase water intake.  Patient is to continue with supplements that bring about gut health, however the patient was cautioned on seedy supplement of food intake.  Patient has a history for diverticulosis/diverticulitis and she is to be preventive minded regarding this condition.  Urinalysis ordered to reflex to culture if deemed appropriate and necessary.    Return in about 4 months (around 6/25/2025), or if symptoms worsen or fail to improve.           [1]   Allergies  Allergen Reactions    Lisinopril ANAPHYLAXIS    Penicillins ANAPHYLAXIS

## 2025-02-26 NOTE — PATIENT INSTRUCTIONS
Patient has been given advisement to modify her workouts to maintain benefit and minimize injury.  Simple stretching prior to extensive workout emphasized.  Increase water intake.  Patient is to continue with supplements that bring about gut health, however the patient was cautioned on seedy supplement of food intake.  Patient has a history for diverticulosis/diverticulitis and she is to be preventive minded regarding this condition.  Urinalysis ordered to reflex to culture if deemed appropriate and necessary.

## 2025-02-26 NOTE — PROGRESS NOTES
Called and spoke to patient regarding urine and recommendations per Dr Patterson, Pt verbalized understanding.

## 2025-03-31 ENCOUNTER — OFFICE VISIT (OUTPATIENT)
Dept: FAMILY MEDICINE CLINIC | Facility: CLINIC | Age: 70
End: 2025-03-31
Payer: MEDICARE

## 2025-03-31 VITALS
RESPIRATION RATE: 18 BRPM | BODY MASS INDEX: 28.17 KG/M2 | WEIGHT: 161 LBS | OXYGEN SATURATION: 98 % | SYSTOLIC BLOOD PRESSURE: 135 MMHG | HEART RATE: 79 BPM | DIASTOLIC BLOOD PRESSURE: 79 MMHG | HEIGHT: 63.5 IN | TEMPERATURE: 98 F

## 2025-03-31 DIAGNOSIS — I10 ESSENTIAL HYPERTENSION: ICD-10-CM

## 2025-03-31 DIAGNOSIS — H26.9 CATARACT OF BOTH EYES, UNSPECIFIED CATARACT TYPE: ICD-10-CM

## 2025-03-31 DIAGNOSIS — Z01.812 ENCOUNTER FOR PRE-OPERATIVE LABORATORY TESTING: ICD-10-CM

## 2025-03-31 DIAGNOSIS — M54.16 LUMBAR RADICULOPATHY: ICD-10-CM

## 2025-03-31 DIAGNOSIS — Z01.818 PRE-OP EXAMINATION: Primary | ICD-10-CM

## 2025-03-31 LAB
ANION GAP SERPL CALC-SCNC: 4 MMOL/L (ref 0–18)
BASOPHILS # BLD AUTO: 0.03 X10(3) UL (ref 0–0.2)
BASOPHILS NFR BLD AUTO: 0.5 %
BILIRUB UR QL: NEGATIVE
BUN BLD-MCNC: 12 MG/DL (ref 9–23)
BUN/CREAT SERPL: 14 (ref 10–20)
CALCIUM BLD-MCNC: 9.1 MG/DL (ref 8.7–10.4)
CHLORIDE SERPL-SCNC: 105 MMOL/L (ref 98–112)
CLARITY UR: CLEAR
CO2 SERPL-SCNC: 29 MMOL/L (ref 21–32)
CREAT BLD-MCNC: 0.86 MG/DL
DEPRECATED RDW RBC AUTO: 48.7 FL (ref 35.1–46.3)
EGFRCR SERPLBLD CKD-EPI 2021: 72 ML/MIN/1.73M2 (ref 60–?)
EOSINOPHIL # BLD AUTO: 0.12 X10(3) UL (ref 0–0.7)
EOSINOPHIL NFR BLD AUTO: 1.9 %
ERYTHROCYTE [DISTWIDTH] IN BLOOD BY AUTOMATED COUNT: 15.1 % (ref 11–15)
FASTING STATUS PATIENT QL REPORTED: NO
GLUCOSE BLD-MCNC: 93 MG/DL (ref 70–99)
GLUCOSE UR-MCNC: NORMAL MG/DL
HCT VFR BLD AUTO: 41.3 %
HGB BLD-MCNC: 13.5 G/DL
HGB UR QL STRIP.AUTO: NEGATIVE
IMM GRANULOCYTES # BLD AUTO: 0.01 X10(3) UL (ref 0–1)
IMM GRANULOCYTES NFR BLD: 0.2 %
KETONES UR-MCNC: NEGATIVE MG/DL
LEUKOCYTE ESTERASE UR QL STRIP.AUTO: 250
LYMPHOCYTES # BLD AUTO: 1.88 X10(3) UL (ref 1–4)
LYMPHOCYTES NFR BLD AUTO: 29.3 %
MCH RBC QN AUTO: 28.8 PG (ref 26–34)
MCHC RBC AUTO-ENTMCNC: 32.7 G/DL (ref 31–37)
MCV RBC AUTO: 88.1 FL
MONOCYTES # BLD AUTO: 0.5 X10(3) UL (ref 0.1–1)
MONOCYTES NFR BLD AUTO: 7.8 %
NEUTROPHILS # BLD AUTO: 3.87 X10 (3) UL (ref 1.5–7.7)
NEUTROPHILS # BLD AUTO: 3.87 X10(3) UL (ref 1.5–7.7)
NEUTROPHILS NFR BLD AUTO: 60.3 %
NITRITE UR QL STRIP.AUTO: NEGATIVE
OSMOLALITY SERPL CALC.SUM OF ELEC: 285 MOSM/KG (ref 275–295)
PH UR: 5.5 [PH] (ref 5–8)
PLATELET # BLD AUTO: 182 10(3)UL (ref 150–450)
POTASSIUM SERPL-SCNC: 3.8 MMOL/L (ref 3.5–5.1)
PROT UR-MCNC: NEGATIVE MG/DL
RBC # BLD AUTO: 4.69 X10(6)UL
SODIUM SERPL-SCNC: 138 MMOL/L (ref 136–145)
SP GR UR STRIP: 1.01 (ref 1–1.03)
UROBILINOGEN UR STRIP-ACNC: NORMAL
WBC # BLD AUTO: 6.4 X10(3) UL (ref 4–11)

## 2025-03-31 PROCEDURE — 80048 BASIC METABOLIC PNL TOTAL CA: CPT | Performed by: FAMILY MEDICINE

## 2025-03-31 PROCEDURE — 81001 URINALYSIS AUTO W/SCOPE: CPT | Performed by: FAMILY MEDICINE

## 2025-03-31 PROCEDURE — 85025 COMPLETE CBC W/AUTO DIFF WBC: CPT | Performed by: FAMILY MEDICINE

## 2025-03-31 NOTE — PATIENT INSTRUCTIONS
All adult screening ordered and done appropriate for patient's age and gender and risk factors and complaints.  Encouraged physical fitness and daily physical activity daily.  Monitor blood pressures and record at home. Limit salt intake.

## 2025-03-31 NOTE — PROGRESS NOTES
Subjective:     Patient ID: Lela Cannon is a 69 year old female.    69 year old hypertensive AA female with bilateral cataracts. Needs pre-op exam and testing for clearance for cataract removal.  The plan is to have 1 eye done at a time.  Currently the patient does not report or display any symptoms consistent with upper respiratory infection and/or acute viral syndrome.  Patient is compliant with her antihypertensive medication.  Patient takes a myriad of vitamins.    The patient has a history for documented lumbar disc disease with radiculopathy and complaints of a continuous, but intermittent right pelvic region discomfort. There is possibly a correlation between this complaint and low back pain.  The location of her discomfort is highly suggestive of an L1-L2 nerve root compression to the right.            History/Other:   Review of Systems  Current Outpatient Medications   Medication Sig Dispense Refill    amLODIPine 5 MG Oral Tab Take 1 tablet (5 mg total) by mouth daily. 90 tablet 3    vitamin B-12 50 MCG Oral Tab Take 1 tablet (50 mcg total) by mouth daily.      Cholecalciferol (VITAMIN D) 50 MCG (2000 UT) Oral Cap Take by mouth.      vitamin E 100 UNITS Oral Cap Take 1 capsule (100 Units total) by mouth daily.      Vitamin C 500 MG Oral Tab Take 1 tablet (500 mg total) by mouth daily.      Zinc 30 MG Oral Cap Take by mouth.      Magnesium 200 MG Oral Tab Take by mouth.      Coenzyme Q10 (CO Q-10 OR) Take 1 tablet by mouth daily.      Lactobacillus (PROBIATA OR) Take by mouth.      meclizine 25 MG Oral Tab Take 1 tablet (25 mg total) by mouth 3 (three) times daily as needed. (Patient not taking: Reported on 2/21/2025) 10 tablet 0    levocetirizine 5 MG Oral Tab Take 1 tablet (5 mg total) by mouth every evening. 20 tablet 0    erythromycin 5 MG/GM Ophthalmic Ointment Apply 1 Application to eye nightly. (Patient not taking: Reported on 2/21/2025) 1 each 0    omega-3 Oral Liquid Take by mouth daily.       GARLIC OR Take 1 tablet by mouth daily.      Misc Natural Products (GLUCOSAMINE CHONDROITIN ADV OR) Take 1 tablet by mouth daily. (Patient not taking: Reported on 2/25/2025)       Allergies:Allergies[1]    Past Medical History:    Back problem    Chronic back pain    Essential hypertension    High blood pressure    Trigger thumb, right thumb    Tubular adenoma of colon    repeat CLN in 2025    Visual impairment      Past Surgical History:   Procedure Laterality Date    Colonoscopy N/A 11/11/2020    Procedure: COLONOSCOPY;  Surgeon: LUZ ELENA Reyes MD;  Location: [facility] ENDOSCOPY    Colonoscopy N/A 08/15/2024    Procedure: COLONOSCOPY;  Surgeon: Les Red MD;  Location: [facility] MAIN OR    Hysterectomy      Incise finger tendon sheath Right 01/26/2024    trigger release right thumb    Skin surgery  08/13/2024    exc lipoma chest and back      No family history on file.   Social History:   Social History     Socioeconomic History    Marital status:    Tobacco Use    Smoking status: Never    Smokeless tobacco: Never   Vaping Use    Vaping status: Never Used   Substance and Sexual Activity    Alcohol use: Never    Drug use: Never   Other Topics Concern    Caffeine Concern No    Exercise Yes   Social History Narrative    The patient does not use an assistive device..      The patient does live in a home with stairs.     Social Drivers of Health     Transportation Needs: No Transportation Needs (2/3/2025)    Transportation Needs     Lack of Transportation: No    [facility]    Housing Stability        Objective:   Vitals:    03/31/25 1534   BP: 135/79   Pulse: 79   Resp: 18   Temp: 98 °F (36.7 °C)       Physical Exam  Constitutional:       General: She is not in acute distress.     Appearance: She is not ill-appearing.   Cardiovascular:      Rate and Rhythm: Normal rate and regular rhythm.      Heart sounds:      No gallop.   Pulmonary:     [No Acute Distress] : no acute distress   Effort: Pulmonary effort is normal. No respiratory distress.      Breath sounds: Normal breath sounds. No wheezing, rhonchi or rales.   Musculoskeletal:      Lumbar back: Spasms and tenderness present. Decreased range of motion.        Back:         Legs:       Comments: Region of discomfort as depicted in the lumbar region and also in the right femoral crease region.   Neurological:      Mental Status: She is alert and oriented to person, place, and time.      Deep Tendon Reflexes: Reflexes normal.   Psychiatric:         Mood and Affect: Mood normal.         Assessment & Plan:   1. Pre-op examination  Well exam.  Clearance pending her resulted labs.    2. Encounter for pre-operative laboratory testing  The following has been ordered.  - CBC W Differential W Platelet [E]; Future  - Basic Metabolic Panel (8) [E]; Future  - Urinalysis, Routine [E]; Future  - CBC W Differential W Platelet [E]  - Basic Metabolic Panel (8) [E]  - Urinalysis, Routine [E]    3. Cataract of both eyes, unspecified cataract type  Reason for surgery.  1 to be done at a time.    4. Essential hypertension  Measures to goal.  Compliance with medication encouraged and emphasized.  Minimize salt intake.    5. Lumbar radiculopathy  Likely the reason for the pelvic/inguinal pain on the right.  May need to be seen by physical medicine.      No orders of the defined types were placed in this encounter.      Meds This Visit:  Requested Prescriptions      No prescriptions requested or ordered in this encounter       Imaging & Referrals:  None     Patient Instructions   All adult screening ordered and done appropriate for patient's age and gender and risk factors and complaints.  Encouraged physical fitness and daily physical activity daily.  Monitor blood pressures and record at home. Limit salt intake.    Return in about 3 months (around 6/30/2025), or if symptoms worsen or fail to improve.           [1]   Allergies  Allergen Reactions    Lisinopril  [Normal Appearance] : normal appearance ANAPHYLAXIS    Penicillins ANAPHYLAXIS      [Irregular rate/rhythm] : irregular rate/rhythm [No Resp Distress] : no resp distress [Clear to Auscultation Bilaterally] : clear to auscultation bilaterally [No Abnormalities] : no abnormalities [No Clubbing] : no clubbing [No Focal Deficits] : no focal deficits [Oriented x3] : oriented x3 [Normal Affect] : normal affect [TextBox_89] : obese

## 2025-04-01 DIAGNOSIS — R82.90 ABNORMAL URINALYSIS: Primary | ICD-10-CM

## 2025-04-03 ENCOUNTER — LAB ENCOUNTER (OUTPATIENT)
Dept: LAB | Facility: HOSPITAL | Age: 70
End: 2025-04-03
Attending: FAMILY MEDICINE
Payer: MEDICARE

## 2025-04-03 DIAGNOSIS — R82.90 ABNORMAL URINALYSIS: ICD-10-CM

## 2025-04-03 PROCEDURE — 87086 URINE CULTURE/COLONY COUNT: CPT

## 2025-04-08 ENCOUNTER — NURSE TRIAGE (OUTPATIENT)
Dept: FAMILY MEDICINE CLINIC | Facility: CLINIC | Age: 70
End: 2025-04-08

## 2025-04-08 NOTE — TELEPHONE ENCOUNTER
Action Requested: Summary for Provider     []  Critical Lab, Recommendations Needed  [] Need Additional Advice  []   FYI    []   Need Orders  [] Need Medications Sent to Pharmacy  [x]  Other     SUMMARY: Patient started with symptoms Saturday, spouse also sick with Covid DX Thursday of last week. Patient having coughing fits but no SOB/CP. Throat is very sore, drinking and eating ok. Able to get fluids down just fine. No nausea/vomiting. No fever at this time.    Discussed use of OTC meds to help with symptoms, discussed if would like Paxlovid - offered video visit but she said she would use OTC's and not interested in Paxlovid. Advised window for antiviral and not helpful later on if taken. States understanding.   Will call if problem, advised of home care measures  ER/IC red flag warning signs reviewed, states understanding and if she needs us she will call    Patient is scheduled for surgery soon, will call them to inform and reschedule     Reason for call: Covid Phase 1b  Onset: Sat, tested positive this morning      Reason for Disposition   COVID-19 diagnosed by positive lab test (e.g., PCR, rapid self-test kit) and mild symptoms (e.g., cough, fever, others) and no complications or SOB    Protocols used: Coronavirus (COVID-19) Diagnosed or [id number]

## 2025-05-26 ENCOUNTER — APPOINTMENT (OUTPATIENT)
Dept: CT IMAGING | Facility: HOSPITAL | Age: 70
End: 2025-05-26
Attending: EMERGENCY MEDICINE
Payer: MEDICARE

## 2025-05-26 ENCOUNTER — HOSPITAL ENCOUNTER (EMERGENCY)
Facility: HOSPITAL | Age: 70
Discharge: HOME | End: 2025-05-26
Attending: EMERGENCY MEDICINE
Payer: MEDICARE

## 2025-05-26 VITALS
OXYGEN SATURATION: 100 % | DIASTOLIC BLOOD PRESSURE: 78 MMHG | HEART RATE: 63 BPM | RESPIRATION RATE: 15 BRPM | SYSTOLIC BLOOD PRESSURE: 158 MMHG | TEMPERATURE: 99 F

## 2025-05-26 DIAGNOSIS — M54.2 CERVICALGIA: Primary | ICD-10-CM

## 2025-05-26 LAB
ANION GAP SERPL CALC-SCNC: 7 MMOL/L (ref 0–18)
BASOPHILS # BLD AUTO: 0.02 X10(3) UL (ref 0–0.2)
BASOPHILS NFR BLD AUTO: 0.4 %
BUN BLD-MCNC: 13 MG/DL (ref 9–23)
BUN/CREAT SERPL: 16.5 (ref 10–20)
CALCIUM BLD-MCNC: 9 MG/DL (ref 8.7–10.4)
CHLORIDE SERPL-SCNC: 107 MMOL/L (ref 98–112)
CO2 SERPL-SCNC: 27 MMOL/L (ref 21–32)
CREAT BLD-MCNC: 0.79 MG/DL (ref 0.55–1.02)
DEPRECATED RDW RBC AUTO: 49.4 FL (ref 35.1–46.3)
EGFRCR SERPLBLD CKD-EPI 2021: 80 ML/MIN/1.73M2 (ref 60–?)
EOSINOPHIL # BLD AUTO: 0.09 X10(3) UL (ref 0–0.7)
EOSINOPHIL NFR BLD AUTO: 1.7 %
ERYTHROCYTE [DISTWIDTH] IN BLOOD BY AUTOMATED COUNT: 15.3 % (ref 11–15)
GLUCOSE BLD-MCNC: 95 MG/DL (ref 70–99)
HCT VFR BLD AUTO: 42.6 % (ref 35–48)
HGB BLD-MCNC: 13.7 G/DL (ref 12–16)
IMM GRANULOCYTES # BLD AUTO: 0.01 X10(3) UL (ref 0–1)
IMM GRANULOCYTES NFR BLD: 0.2 %
LYMPHOCYTES # BLD AUTO: 1.15 X10(3) UL (ref 1–4)
LYMPHOCYTES NFR BLD AUTO: 21.4 %
MCH RBC QN AUTO: 28.4 PG (ref 26–34)
MCHC RBC AUTO-ENTMCNC: 32.2 G/DL (ref 31–37)
MCV RBC AUTO: 88.2 FL (ref 80–100)
MONOCYTES # BLD AUTO: 0.48 X10(3) UL (ref 0.1–1)
MONOCYTES NFR BLD AUTO: 8.9 %
NEUTROPHILS # BLD AUTO: 3.63 X10 (3) UL (ref 1.5–7.7)
NEUTROPHILS # BLD AUTO: 3.63 X10(3) UL (ref 1.5–7.7)
NEUTROPHILS NFR BLD AUTO: 67.4 %
OSMOLALITY SERPL CALC.SUM OF ELEC: 292 MOSM/KG (ref 275–295)
PLATELET # BLD AUTO: 158 10(3)UL (ref 150–450)
POTASSIUM SERPL-SCNC: 3.4 MMOL/L (ref 3.5–5.1)
RBC # BLD AUTO: 4.83 X10(6)UL (ref 3.8–5.3)
SODIUM SERPL-SCNC: 141 MMOL/L (ref 136–145)
WBC # BLD AUTO: 5.4 X10(3) UL (ref 4–11)

## 2025-05-26 PROCEDURE — 99284 EMERGENCY DEPT VISIT MOD MDM: CPT

## 2025-05-26 PROCEDURE — 70498 CT ANGIOGRAPHY NECK: CPT | Performed by: EMERGENCY MEDICINE

## 2025-05-26 PROCEDURE — 96360 HYDRATION IV INFUSION INIT: CPT

## 2025-05-26 PROCEDURE — 80048 BASIC METABOLIC PNL TOTAL CA: CPT | Performed by: EMERGENCY MEDICINE

## 2025-05-26 PROCEDURE — 70496 CT ANGIOGRAPHY HEAD: CPT | Performed by: EMERGENCY MEDICINE

## 2025-05-26 PROCEDURE — 85025 COMPLETE CBC W/AUTO DIFF WBC: CPT | Performed by: EMERGENCY MEDICINE

## 2025-05-26 NOTE — ED INITIAL ASSESSMENT (HPI)
Pt to ED for ED for a headache since Thursday, that has increased in pain today. Pt states it is a sharp pain on right side. Pt also had cataract surgery on left eye on Tuesday. Pt denies weakness or numbness in extremities, has symmetrical smile, has clear speech, denies any blurry vision.

## 2025-05-28 ENCOUNTER — OFFICE VISIT (OUTPATIENT)
Dept: FAMILY MEDICINE CLINIC | Facility: CLINIC | Age: 70
End: 2025-05-28
Payer: MEDICARE

## 2025-05-28 VITALS
TEMPERATURE: 97 F | DIASTOLIC BLOOD PRESSURE: 68 MMHG | HEIGHT: 63.5 IN | SYSTOLIC BLOOD PRESSURE: 107 MMHG | OXYGEN SATURATION: 98 % | WEIGHT: 161 LBS | HEART RATE: 81 BPM | RESPIRATION RATE: 20 BRPM | BODY MASS INDEX: 28.17 KG/M2

## 2025-05-28 DIAGNOSIS — M54.2 NECK PAIN: Primary | ICD-10-CM

## 2025-05-28 PROCEDURE — 3074F SYST BP LT 130 MM HG: CPT | Performed by: FAMILY MEDICINE

## 2025-05-28 PROCEDURE — 1126F AMNT PAIN NOTED NONE PRSNT: CPT | Performed by: FAMILY MEDICINE

## 2025-05-28 PROCEDURE — 3078F DIAST BP <80 MM HG: CPT | Performed by: FAMILY MEDICINE

## 2025-05-28 PROCEDURE — 99214 OFFICE O/P EST MOD 30 MIN: CPT | Performed by: FAMILY MEDICINE

## 2025-05-28 PROCEDURE — 1159F MED LIST DOCD IN RCRD: CPT | Performed by: FAMILY MEDICINE

## 2025-05-28 PROCEDURE — 1160F RVW MEDS BY RX/DR IN RCRD: CPT | Performed by: FAMILY MEDICINE

## 2025-05-28 PROCEDURE — 3008F BODY MASS INDEX DOCD: CPT | Performed by: FAMILY MEDICINE

## 2025-05-28 NOTE — PROGRESS NOTES
Subjective:   Patient ID: Lela Cannon is a 69 year old female.    HPI  Here for f/u er   Complaints about sever neck pain   Had cta and was negative   She states that for days she has been having pain over the trapezius nd also has TMJ for months   This severe pain staretd after she had a cataract surgery where she had to lay diwnb on her back for 2 hours non stop  She is better now but it still hurts  History/Other:   Review of Systems   Constitutional: Negative.  Negative for fatigue.   Respiratory:  Negative for cough, chest tightness and shortness of breath.    Cardiovascular: Negative.    Musculoskeletal:  Positive for back pain, neck pain and neck stiffness. Negative for gait problem, joint swelling and myalgias.   Neurological:  Negative for weakness and numbness.         Current Medications[1]  Allergies:Allergies[2]    Objective:   Physical Exam  Constitutional:       Appearance: She is well-developed.   Cardiovascular:      Rate and Rhythm: Normal rate and regular rhythm.      Heart sounds: Normal heart sounds.   Pulmonary:      Effort: Pulmonary effort is normal. No respiratory distress.      Breath sounds: Normal breath sounds. No wheezing or rales.   Abdominal:      General: There is no distension.      Palpations: Abdomen is soft.      Tenderness: There is no abdominal tenderness.   Musculoskeletal:         General: Tenderness present. No deformity.      Lumbar back: Spasms and tenderness present. Decreased range of motion.   Neurological:      Mental Status: She is alert and oriented to person, place, and time.      Motor: No abnormal muscle tone.      Coordination: Coordination normal.      Deep Tendon Reflexes: Reflexes are normal and symmetric. Reflexes normal.         Assessment & Plan:     ICD-10-CM    1. Neck pain  M54.2       Nsaids prn   Pain appars muscular      No orders of the defined types were placed in this encounter.      Meds This Visit:  Requested Prescriptions      No  prescriptions requested or ordered in this encounter       Imaging & Referrals:  None         [1]   Current Outpatient Medications   Medication Sig Dispense Refill    lidocaine-menthol 4-1 % External Patch Place 1 patch onto the skin Q24H PRN (pain). 30 patch 0    amLODIPine 5 MG Oral Tab Take 1 tablet (5 mg total) by mouth daily. 90 tablet 3    vitamin B-12 50 MCG Oral Tab Take 1 tablet (50 mcg total) by mouth daily.      Cholecalciferol (VITAMIN D) 50 MCG (2000 UT) Oral Cap Take by mouth.      vitamin E 100 UNITS Oral Cap Take 1 capsule (100 Units total) by mouth daily.      Vitamin C 500 MG Oral Tab Take 1 tablet (500 mg total) by mouth daily.      omega-3 Oral Liquid Take by mouth daily.      Zinc 30 MG Oral Cap Take by mouth.      Magnesium 200 MG Oral Tab Take by mouth.      Coenzyme Q10 (CO Q-10 OR) Take 1 tablet by mouth daily.      Lactobacillus (PROBIATA OR) Take by mouth.      meclizine 25 MG Oral Tab Take 1 tablet (25 mg total) by mouth 3 (three) times daily as needed. (Patient not taking: Reported on 2/21/2025) 10 tablet 0    levocetirizine 5 MG Oral Tab Take 1 tablet (5 mg total) by mouth every evening. 20 tablet 0    erythromycin 5 MG/GM Ophthalmic Ointment Apply 1 Application to eye nightly. (Patient not taking: Reported on 2/21/2025) 1 each 0    GARLIC OR Take 1 tablet by mouth daily.      Misc Natural Products (GLUCOSAMINE CHONDROITIN ADV OR) Take 1 tablet by mouth daily. (Patient not taking: Reported on 2/25/2025)     [2]   Allergies  Allergen Reactions    Lisinopril ANAPHYLAXIS    Penicillins ANAPHYLAXIS

## 2025-05-29 ENCOUNTER — TELEPHONE (OUTPATIENT)
Dept: CASE MANAGEMENT | Age: 70
End: 2025-05-29

## 2025-05-29 DIAGNOSIS — M54.9 UPPER BACK PAIN: ICD-10-CM

## 2025-05-29 DIAGNOSIS — M25.512 BILATERAL SHOULDER PAIN, UNSPECIFIED CHRONICITY: ICD-10-CM

## 2025-05-29 DIAGNOSIS — M54.2 NECK PAIN: Primary | ICD-10-CM

## 2025-05-29 DIAGNOSIS — M54.41 ACUTE RIGHT-SIDED LOW BACK PAIN WITH RIGHT-SIDED SCIATICA: ICD-10-CM

## 2025-05-29 DIAGNOSIS — M25.511 BILATERAL SHOULDER PAIN, UNSPECIFIED CHRONICITY: ICD-10-CM

## 2025-05-29 DIAGNOSIS — M54.2 CERVICALGIA: ICD-10-CM

## 2025-05-29 NOTE — TELEPHONE ENCOUNTER
Dr. Patterson,     Patient called requesting referral to Dr. Gill for acupuncture.     Pended referral please review diagnosis and sign off if you agree.    Thank you.  Misti Bey  [facility]

## 2025-06-02 NOTE — TELEPHONE ENCOUNTER
Referral has been reviewed and signed.  Please call the patient and let her know so she can make her appointment.

## 2025-06-02 NOTE — ED PROVIDER NOTES
Patient Seen in: [facility] Emergency Department        History  Chief Complaint   Patient presents with    Headache     Stated Complaint: headache    Subjective:   HPI            69 year old female with h/o chronic back pain, htn who presents with HA for the past couple days now increasing today. Pt reports sharp pain to neck and R side of head. She recently had cataract surgery on the left, vision is good, states she thinks HA started bc she was in an awkward position with head leaned back for prolonged time.  Pt denies: fever/chills, sinus congestion, cough, rhinorrhea, vision change, neck stiffness, sore throat, cp, sob, motor weakness, numb/tingling, focal neuro deficit, speech changes, trauma.  Pt states her biggest concern is that she has a tumor.       Objective:     Past Medical History:    Back problem    Chronic back pain    Essential hypertension    High blood pressure    Trigger thumb, right thumb    Tubular adenoma of colon    repeat CLN in 2025    Visual impairment              Past Surgical History:   Procedure Laterality Date    Colonoscopy N/A 11/11/2020    Procedure: COLONOSCOPY;  Surgeon: LUZ ELENA Reyes MD;  Location: [facility] ENDOSCOPY    Colonoscopy N/A 08/15/2024    Procedure: COLONOSCOPY;  Surgeon: Les Red MD;  Location: [facility] MAIN OR    Hysterectomy      Incise finger tendon sheath Right 01/26/2024    trigger release right thumb    Skin surgery  08/13/2024    exc lipoma chest and back                Social History     Socioeconomic History    Marital status:    Tobacco Use    Smoking status: Never    Smokeless tobacco: Never   Vaping Use    Vaping status: Never Used   Substance and Sexual Activity    Alcohol use: Never    Drug use: Never   Other Topics Concern    Caffeine Concern No    Exercise Yes   Social History Narrative    The patient does not use an assistive device..      The patient does live in a home with stairs.     Social Drivers of Health      Transportation Needs: No Transportation Needs (2/3/2025)    Transportation Needs     Lack of Transportation: No    Received from [facility]    Housing Stability                                Physical Exam    ED Triage Vitals   BP 05/26/25 0305 (!) 169/91   Pulse 05/26/25 0305 80   Resp 05/26/25 0303 20   Temp 05/26/25 0303 98.9 °F (37.2 °C)   Temp src 05/26/25 0303 Temporal   SpO2 05/26/25 0305 100 %   O2 Device 05/26/25 0305 None (Room air)       Current Vitals:   No data recorded        Physical Exam  Vitals and nursing note reviewed.   Constitutional:       General: She is not in acute distress.     Appearance: She is well-developed.   HENT:      Head: Normocephalic and atraumatic.     Eyes:      Conjunctiva/sclera: Conjunctivae normal.      Pupils: Pupils are equal, round, and reactive to light.   Neck:      Trachea: Trachea and phonation normal.   Cardiovascular:      Rate and Rhythm: Normal rate and regular rhythm.      Pulses:           Radial pulses are 2+ on the right side and 2+ on the left side.      Heart sounds: Normal heart sounds.   Pulmonary:      Effort: Pulmonary effort is normal. No respiratory distress.      Breath sounds: Normal breath sounds.   Musculoskeletal:         General: Tenderness present. No deformity or signs of injury.      Cervical back: Neck supple. Spasms and tenderness present. No swelling, deformity, erythema, lacerations, rigidity or bony tenderness. Muscular tenderness present. No spinous process tenderness.      Comments: MS 5/5 BUE and BLE   Skin:     General: Skin is warm and dry.      Findings: No erythema or rash.   Neurological:      Mental Status: She is alert and oriented to person, place, and time. She is not disoriented.      GCS: GCS eye subscore is 4. GCS verbal subscore is 5. GCS motor subscore is 6.      Cranial Nerves: No cranial nerve deficit.      Sensory: No sensory deficit.      Motor: No tremor.   Psychiatric:         Behavior:  Behavior normal.                 ED Course  Labs Reviewed   CBC WITH DIFFERENTIAL WITH PLATELET - Abnormal; Notable for the following components:       Result Value    RDW-SD 49.4 (*)     RDW 15.3 (*)     All other components within normal limits   BASIC METABOLIC PANEL (8) - Abnormal; Notable for the following components:    Potassium 3.4 (*)     All other components within normal limits   RAINBOW DRAW BLUE   RAINBOW DRAW GOLD       ED Course as of 06/01/25 1918  ------------------------------------------------------------  Time: 05/26 0440  Comment: Pt states she would like to r/o anything intracranial, does not want IV meds at this time. She will accept lido patch.  ------------------------------------------------------------  Time: 05/26 0627  Value: CTA BRAIN + CTA CAROTIDS (CPT=70496/96223)  Comment: Noncontrast head CT.  CT angiogram of the intracranial and neck vasculature.    Comparison: Brain MRI from 2/1/2025.  CTA head and neck from 2/1/2025.      IMPRESSION:    Noncontrast head CT: No evidence of acute appearing intracranial mass.  No evidence of extra-axial fluid collections.  No evidence of mass effect, midline shift compression, hydrocephalus.    No large evolving acute infarct.  Likely sequelae of small vessel ischemic changes.  If clinical suspicion for small area of acute infarct, MRI can be done for more sensitive characterization.      CT angiogram head:    Bilateral distal ICAs are patent.    Branches of bilateral MCAs as well as ACAs are patent.    Bilateral distal vertebral arteries and the basilar artery appear patent.  Bilateral PCAs are patent.    No hemodynamically significant narrowing.  No vascular occlusion.    No aneurysms.    Major dural venous sinuses are patent.        CTA Neck:    No evidence of aortic aneurysm.  Origins of great vessels are patent.  Both subclavian arteries are patent.    Both common carotid arteries are patent.  Less than 50% stenosis involving the left mid  common carotid artery secondary to noncalcified plaque.    Carotid bifurcations are patent.  Both ICAs are patent.  Both vertebral arteries are patent.    No hemodynamically significant narrowing.  No vascular occlusion or dissection.                             MDM     Pulse Ox: 100%, Normal, RA    Medications   sodium chloride 0.9 % IV bolus 1,000 mL (0 mL Intravenous Stopped 5/26/25 0524)   iopamidol 76% (ISOVUE-370) injection for power injector (50 mL Intravenous Given 5/26/25 0600)       CTA Brain/Neck with no acute findings.   Pt reports feeling better with patch.   Pt will treat as musculoskeletal, will return for any worsening sx otherwise f/u with PCP.        Medical Decision Making      Disposition and Plan     Clinical Impression:  1. Cervicalgia         Disposition:  Discharge  5/26/2025  6:37 am    Follow-up:  Evelio Patterson, [address] [id number] [phone]    Schedule an appointment as soon as possible for a visit  Call for next available appointment    We recommend that you schedule follow up care with a primary care provider within the next three months to obtain basic health screening including reassessment of your blood pressure.      Medications Prescribed:  Discharge Medication List as of 5/26/2025  6:41 AM        START taking these medications    Details   lidocaine-menthol 4-1 % External Patch Place 1 patch onto the skin Q24H PRN (pain)., Normal, Disp-30 patch, R-0                   Supplementary Documentation:

## 2025-06-02 NOTE — TELEPHONE ENCOUNTER
Called the patient to let her know the referral has been placed. She verbalized understanding. She already has an appointment scheduled.

## 2025-06-03 ENCOUNTER — OFFICE VISIT (OUTPATIENT)
Dept: FAMILY MEDICINE CLINIC | Facility: CLINIC | Age: 70
End: 2025-06-03
Payer: MEDICARE

## 2025-06-03 VITALS — HEIGHT: 63.5 IN | RESPIRATION RATE: 18 BRPM | BODY MASS INDEX: 28 KG/M2 | WEIGHT: 160 LBS

## 2025-06-03 DIAGNOSIS — H61.23 BILATERAL IMPACTED CERUMEN: Primary | ICD-10-CM

## 2025-06-03 PROCEDURE — 3008F BODY MASS INDEX DOCD: CPT | Performed by: FAMILY MEDICINE

## 2025-06-03 PROCEDURE — 99213 OFFICE O/P EST LOW 20 MIN: CPT | Performed by: FAMILY MEDICINE

## 2025-06-03 PROCEDURE — 1159F MED LIST DOCD IN RCRD: CPT | Performed by: FAMILY MEDICINE

## 2025-06-04 ENCOUNTER — OFFICE VISIT (OUTPATIENT)
Dept: OTOLARYNGOLOGY | Facility: CLINIC | Age: 70
End: 2025-06-04
Payer: MEDICARE

## 2025-06-04 DIAGNOSIS — H61.23 BILATERAL IMPACTED CERUMEN: ICD-10-CM

## 2025-06-04 DIAGNOSIS — H60.8X3 CHRONIC ECZEMATOUS OTITIS EXTERNA OF BOTH EARS: Primary | ICD-10-CM

## 2025-06-04 DIAGNOSIS — H61.309 STENOSIS OF EXTERNAL EAR CANAL: ICD-10-CM

## 2025-06-04 PROCEDURE — 1159F MED LIST DOCD IN RCRD: CPT | Performed by: STUDENT IN AN ORGANIZED HEALTH CARE EDUCATION/TRAINING PROGRAM

## 2025-06-04 PROCEDURE — 1160F RVW MEDS BY RX/DR IN RCRD: CPT | Performed by: STUDENT IN AN ORGANIZED HEALTH CARE EDUCATION/TRAINING PROGRAM

## 2025-06-04 PROCEDURE — 99213 OFFICE O/P EST LOW 20 MIN: CPT | Performed by: STUDENT IN AN ORGANIZED HEALTH CARE EDUCATION/TRAINING PROGRAM

## 2025-06-04 PROCEDURE — 69210 REMOVE IMPACTED EAR WAX UNI: CPT | Performed by: STUDENT IN AN ORGANIZED HEALTH CARE EDUCATION/TRAINING PROGRAM

## 2025-06-04 NOTE — PROGRESS NOTES
Lela Cannon is a 69 year old female.   Chief Complaint   Patient presents with    Ear Wax     Ear cleaning      HPI:   69-year-old presents with chronic ear itching and clogged ear    Current Medications[1]   Past Medical History[2]   Social History:  Short Social Hx on File[3]   Past Surgical History[4]      EXAM:   There were no vitals taken for this visit.    System Details   Skin Inspection - Normal.   Constitutional Overall appearance - Normal.   Head/Face Symmetric, TMJ tenderness not present    Eyes EOMI, PERRL   Right ear:  Canal with stenotic ear canal, cerumen and irritated ear canal skin, TM intact, no VALERIE   Left ear:  Canal with stenotic ear canal, cerumen irritated ear canal skin, TM intact, no VALERIE   Nose: Septum midline, inferior turbinates not enlarged, nasal valves without collapse    Oral cavity/Oropharynx: No lesions or masses on inspection or palpation, tonsils symmetric    Neck: Soft without LAD, thyroid not enlarged  Voice clear/ no stridor   Other:      SCOPES AND PROCEDURES:     Canals:  Left: Canal with cerumen preventing adequate view of TM, debrided with instrumentation  Right: Canal with cerumen preventing adequate view of TM, debrided with instrumentation    Tympanic Membranes:  Left: Normal tympanic membrane.   Right: Normal tympanic membrane.     TM Visualized Method:   Left TM examined via otomicroscopy.    Right TM examined via otomicroscopy.      PROCEDURE:   Removal of cerumen impaction   The cerumen impaction was completely removed on the left and right sides using microscopy as necessary.   Removal was completed by using a curette and suction.     AUDIOGRAM AND IMAGING:         IMPRESSION:   1. Chronic eczematous otitis externa of both ears    2. Bilateral impacted cerumen    3. Stenosis of external ear canal       Recommendations:  - Somewhat stenotic ear canals also with dry and irritated ear canal skin with evidence of dermatitis in the setting of her itching  -  Canal was cleaned of cerumen with an improvement in her hearing  - She wishes to try a natural ear oil for her ear itching    The patient indicates understanding of these issues and agrees to the plan.      Michael Castanon MD  6/4/2025  3:41 PM       [1]   Current Outpatient Medications   Medication Sig Dispense Refill    amLODIPine 5 MG Oral Tab Take 1 tablet (5 mg total) by mouth daily. 90 tablet 3    vitamin B-12 50 MCG Oral Tab Take 1 tablet (50 mcg total) by mouth daily.      Cholecalciferol (VITAMIN D) 50 MCG (2000 UT) Oral Cap Take by mouth.      vitamin E 100 UNITS Oral Cap Take 1 capsule (100 Units total) by mouth daily.      Vitamin C 500 MG Oral Tab Take 1 tablet (500 mg total) by mouth daily.      omega-3 Oral Liquid Take by mouth daily.      Zinc 30 MG Oral Cap Take by mouth.      Magnesium 200 MG Oral Tab Take by mouth.      Coenzyme Q10 (CO Q-10 OR) Take 1 tablet by mouth daily.      Lactobacillus (PROBIATA OR) Take by mouth.      lidocaine-menthol 4-1 % External Patch Place 1 patch onto the skin Q24H PRN (pain). 30 patch 0   [2]   Past Medical History:   Back problem    Chronic back pain    Essential hypertension    High blood pressure    Trigger thumb, right thumb    Tubular adenoma of colon    repeat CLN in 2025    Visual impairment   [3]   Social History  Socioeconomic History    Marital status:    Tobacco Use    Smoking status: Never    Smokeless tobacco: Never   Vaping Use    Vaping status: Never Used   Substance and Sexual Activity    Alcohol use: Never    Drug use: Never   Other Topics Concern    Caffeine Concern No    Exercise Yes   Social History Narrative    The patient does not use an assistive device..      The patient does live in a home with stairs.     Social Drivers of Health     Transportation Needs: No Transportation Needs (2/3/2025)    Transportation Needs     Lack of Transportation: No    Received from [facility]    Housing Stability   [4]   Past Surgical  History:  Procedure Laterality Date    Colonoscopy N/A 11/11/2020    Procedure: COLONOSCOPY;  Surgeon: LUZ ELENA Reyes MD;  Location: [facility] ENDOSCOPY    Colonoscopy N/A 08/15/2024    Procedure: COLONOSCOPY;  Surgeon: Les Red MD;  Location: [facility] MAIN OR    Hysterectomy      Incise finger tendon sheath Right 01/26/2024    trigger release right thumb    Skin surgery  08/13/2024    exc lipoma chest and back

## 2025-06-24 ENCOUNTER — OFFICE VISIT (OUTPATIENT)
Dept: FAMILY MEDICINE CLINIC | Facility: CLINIC | Age: 70
End: 2025-06-24
Payer: MEDICARE

## 2025-06-24 VITALS
HEART RATE: 85 BPM | SYSTOLIC BLOOD PRESSURE: 120 MMHG | HEIGHT: 63.5 IN | DIASTOLIC BLOOD PRESSURE: 76 MMHG | OXYGEN SATURATION: 100 % | TEMPERATURE: 98 F | BODY MASS INDEX: 28 KG/M2 | RESPIRATION RATE: 18 BRPM | WEIGHT: 160 LBS

## 2025-06-24 DIAGNOSIS — E53.8 VITAMIN B12 DEFICIENCY: ICD-10-CM

## 2025-06-24 DIAGNOSIS — I10 ESSENTIAL HYPERTENSION: ICD-10-CM

## 2025-06-24 DIAGNOSIS — Z28.21 VARICELLA ZOSTER VIRUS (VZV) VACCINATION DECLINED: ICD-10-CM

## 2025-06-24 DIAGNOSIS — L85.3 DRY SKIN DERMATITIS: ICD-10-CM

## 2025-06-24 DIAGNOSIS — Z28.21 PNEUMOCOCCAL VACCINATION DECLINED BY PATIENT: ICD-10-CM

## 2025-06-24 DIAGNOSIS — Z00.00 MEDICARE ANNUAL WELLNESS VISIT, SUBSEQUENT: Primary | ICD-10-CM

## 2025-06-24 LAB
ALBUMIN SERPL-MCNC: 4.5 G/DL (ref 3.2–4.8)
ALBUMIN/GLOB SERPL: 1.6 {RATIO} (ref 1–2)
ALP LIVER SERPL-CCNC: 97 U/L (ref 55–142)
ALT SERPL-CCNC: 19 U/L (ref 10–49)
ANION GAP SERPL CALC-SCNC: 6 MMOL/L (ref 0–18)
AST SERPL-CCNC: 25 U/L (ref ?–34)
BILIRUB SERPL-MCNC: 0.7 MG/DL (ref 0.2–1.1)
BUN BLD-MCNC: 12 MG/DL (ref 9–23)
BUN/CREAT SERPL: 13.6 (ref 10–20)
CALCIUM BLD-MCNC: 9.5 MG/DL (ref 8.7–10.4)
CHLORIDE SERPL-SCNC: 104 MMOL/L (ref 98–112)
CHOLEST SERPL-MCNC: 196 MG/DL (ref ?–200)
CO2 SERPL-SCNC: 30 MMOL/L (ref 21–32)
CREAT BLD-MCNC: 0.88 MG/DL (ref 0.55–1.02)
EGFRCR SERPLBLD CKD-EPI 2021: 70 ML/MIN/1.73M2 (ref 60–?)
FASTING PATIENT LIPID ANSWER: NO
FASTING STATUS PATIENT QL REPORTED: NO
GLOBULIN PLAS-MCNC: 2.8 G/DL (ref 2–3.5)
GLUCOSE BLD-MCNC: 71 MG/DL (ref 70–99)
HDLC SERPL-MCNC: 73 MG/DL (ref 40–59)
LDLC SERPL CALC-MCNC: 108 MG/DL (ref ?–100)
NONHDLC SERPL-MCNC: 123 MG/DL (ref ?–130)
OSMOLALITY SERPL CALC.SUM OF ELEC: 288 MOSM/KG (ref 275–295)
POTASSIUM SERPL-SCNC: 3.9 MMOL/L (ref 3.5–5.1)
PROT SERPL-MCNC: 7.3 G/DL (ref 5.7–8.2)
SODIUM SERPL-SCNC: 140 MMOL/L (ref 136–145)
TRIGL SERPL-MCNC: 83 MG/DL (ref 30–149)
TSI SER-ACNC: 2.14 UIU/ML (ref 0.55–4.78)
VIT B12 SERPL-MCNC: 587 PG/ML (ref 211–911)
VLDLC SERPL CALC-MCNC: 14 MG/DL (ref 0–30)

## 2025-06-24 PROCEDURE — 96160 PT-FOCUSED HLTH RISK ASSMT: CPT | Performed by: FAMILY MEDICINE

## 2025-06-24 PROCEDURE — 1170F FXNL STATUS ASSESSED: CPT | Performed by: FAMILY MEDICINE

## 2025-06-24 PROCEDURE — 3074F SYST BP LT 130 MM HG: CPT | Performed by: FAMILY MEDICINE

## 2025-06-24 PROCEDURE — G0439 PPPS, SUBSEQ VISIT: HCPCS | Performed by: FAMILY MEDICINE

## 2025-06-24 PROCEDURE — 1125F AMNT PAIN NOTED PAIN PRSNT: CPT | Performed by: FAMILY MEDICINE

## 2025-06-24 PROCEDURE — 3008F BODY MASS INDEX DOCD: CPT | Performed by: FAMILY MEDICINE

## 2025-06-24 PROCEDURE — 80053 COMPREHEN METABOLIC PANEL: CPT | Performed by: FAMILY MEDICINE

## 2025-06-24 PROCEDURE — 3078F DIAST BP <80 MM HG: CPT | Performed by: FAMILY MEDICINE

## 2025-06-24 PROCEDURE — 1159F MED LIST DOCD IN RCRD: CPT | Performed by: FAMILY MEDICINE

## 2025-06-24 PROCEDURE — 82607 VITAMIN B-12: CPT | Performed by: FAMILY MEDICINE

## 2025-06-24 PROCEDURE — 84443 ASSAY THYROID STIM HORMONE: CPT | Performed by: FAMILY MEDICINE

## 2025-06-24 PROCEDURE — 80061 LIPID PANEL: CPT | Performed by: FAMILY MEDICINE

## 2025-06-24 NOTE — PROGRESS NOTES
Subjective:     Patient ID: Lela Cannon is a 70 year old female.    70 year old hypertensive AA female here for medicare annual visit which will satisfy all requirement for a complete preventive care physical and for status update on any confirmed chronic medical illnesses and follow up on any previous labs or procedures that were suggestive or in need of further work up. Colonoscopy is current. Bowel and bladder functions are intact.    Patient declines on shingles pneumococcal vaccine.    Patient currently denies headaches, chest pain, dizziness, shortness of breath, acute visual changes, and/or exertional fatigue.    Patient reports dry skin that particular involving bilateral EACs.  Ear canals constantly itching. Patient looking for a solution.            History/Other:   Review of Systems  Current Medications[1]  Allergies:Allergies[2]    Past Medical History[3]   Past Surgical History[4]   Family History[5]   Social History: Short Social Hx on File[6]     Lela Cannon's SCREENING SCHEDULE   Tests on this list are recommended by your physician but may not be covered, or covered at this frequency, by your insurer. Please check with your insurance carrier before scheduling to verify coverage.   PREVENTATIVE SERVICES  INDICATIONS AND SCHEDULE Internal Lab or Procedure External Lab or Procedure   Diabetes Screening      HbgA1C   Annually No results found for: \"A1C\"      No data to display                Fasting Blood Sugar (FSB)Annually Glucose (mg/dL)   Date Value   06/24/2025 71     GLUCOSE (mg/dL)   Date Value   05/30/2023 73         No data to display               Cardiovascular Disease Screening     LDL Annually LDL Cholesterol (mg/dL)   Date Value   06/24/2025 108 (H)     LDL-CHOLESTEROL (mg/dL (calc))   Date Value   05/30/2023 105 (H)        EKG One Time      Colorectal Cancer Screening      Colonoscopy Screen every 10 years Health Maintenance   Topic Date Due    Colorectal Cancer  Screening  08/15/2029    Update Health Maintenance if applicable    Flex Sigmoidoscopy Screen every 5 years No results found for this or any previous visit.      No data to display                 Fecal Occult Blood Annually No results found for: \"FOB\", \"OCCULTSTOOL\"      No data to display                Glaucoma Screening      Ophthalmology Visit Annually       Bone Density Screening      Dexascan Every two years Last Dexa Scan:    XR DEXA BONE DENSITOMETRY (CPT=77080) 01/18/2022        No data to display               Pap and Pelvic      Pap: Every 3 yrs age 21-65 or Pap+HPV every 5 yrs age 30-65, age 65 and older at high risk   No recommendations at this time Update Health Maintenance if applicable    Chlamydia  Annually if high risk No results found for: \"CHLAMYDIA\"      No data to display                Screening Mammogram      Mammogram  Annually Health Maintenance   Topic Date Due    Mammogram  10/28/2025    Update Health Maintenance if applicable   Immunizations      Influenza No orders found for this or any previous visit. Update Immunization Activity if applicable    Pneumococcal No orders found for this or any previous visit. Update Immunization Activity if applicable    Hepatitis B No orders found for this or any previous visit. Update Immunization Activity if applicable    Tetanus No orders found for this or any previous visit. Update Immunization Activity if applicable    Zoster (Not covered by Medicare Part B) No orders found for this or any previous visit. Update Immunization Activity if applicable     SPECIFIC DISEASE MONITORING Internal Lab or Procedure External Lab or Procedure   Annual Monitoring of Persistent     Medications (ACE/ARB, digoxin, diuretics)    Potassium  Annually Potassium (mmol/L)   Date Value   06/24/2025 3.9     POTASSIUM (mmol/L)   Date Value   05/30/2023 3.7         No data to display                Creatinine  Annually CREATININE (mg/dL)   Date Value   05/30/2023 0.79      Creatinine (mg/dL)   Date Value   06/24/2025 0.88         No data to display                Digoxin Serum Conc  Annually No results found for: \"DIGOXIN\"      No data to display                Diabetes      HgbA1C  Annually No results found for: \"A1C\"      No data to display                Creat/alb ratio  Annually      LDL  Annually LDL Cholesterol (mg/dL)   Date Value   06/24/2025 108 (H)     LDL-CHOLESTEROL (mg/dL (calc))   Date Value   05/30/2023 105 (H)         No data to display                 Dilated Eye exam  Annually      No data to display                   No data to display                COPD      Spirometry Testing Annually No results found for this or any previous visit.      No data to display                      General Health     In the past six months, have you lost more than 10 pounds without trying?: (Patient-Rptd) 2 - No    Has your appetite been poor?: (Patient-Rptd) No    Type of Diet: (Patient-Rptd) Vegetarian    How does the patient maintain a good energy level?: (Patient-Rptd) Appropriate Exercise    How would you describe your daily physical activity?: (Patient-Rptd) Moderate    How would you describe your current health state?: (Patient-Rptd) Good    How do you maintain positive mental well-being?: (Patient-Rptd) Social Interaction, Visiting Friends, Visiting Family         Have you had any immunizations at another office such as Influenza, Hepatitis B, Tetanus, or Pneumococcal?: (Patient-Rptd) No     Functional Ability     Bathing or Showering: (Patient-Rptd) Able without help    Toileting: (Patient-Rptd) Able without help    Dressing: (Patient-Rptd) Able without help    Eating: (Patient-Rptd) Able without help    Driving: (Patient-Rptd) Able without help    Preparing your meals: (Patient-Rptd) Able without help    Managing money/bills: (Patient-Rptd) Able without help    Taking medications as prescribed: (Patient-Rptd) Able without help    Are you able to afford your medications?:  (Patient-Rptd) Yes    Hearing Problems?: (Patient-Rptd) No     Functional Status     Hearing Problems?: (Patient-Rptd) No    Vision Problems? : (Patient-Rptd) No    Difficulty walking?: (Patient-Rptd) No    Difficulty dressing or bathing?: (Patient-Rptd) No    Problems with daily activities? : (Patient-Rptd) No    Memory Problems?: (Patient-Rptd) No      Fall/Risk Assessment                                                              Depression Screening (PHQ-2/PHQ-9): Over the LAST 2 WEEKS                      Advance Directives     Do you have a healthcare power of ?: (Patient-Rptd) Yes    Do you have a living will?: (Patient-Rptd) Yes     Hearing Assessment (Required for AWV/SWV)      Hearing Screening    Screening Method: Finger Rub  Finger Rub Result: Pass         Visual Acuity                   Cognitive Assessment     What day of the week is this?: Correct    What month is it?: Correct    What year is it?: Correct    Recall \"Ball\": Correct    Recall \"Flag\": Correct    Recall \"Tree\": Correct          Objective:   Vitals:    06/24/25 1205   BP: 120/76   Pulse:    Resp:    Temp:        Physical Exam  Constitutional:       Appearance: Normal appearance. She is not ill-appearing.   HENT:      Right Ear: Tympanic membrane normal.      Left Ear: Tympanic membrane normal.      Nose: Nose normal.      Mouth/Throat:      Mouth: Mucous membranes are moist.   Neck:      Thyroid: No thyromegaly.   Cardiovascular:      Rate and Rhythm: Normal rate and regular rhythm.      Heart sounds:      No gallop.   Pulmonary:      Effort: Pulmonary effort is normal. No respiratory distress.      Breath sounds: Normal breath sounds.   Abdominal:      General: Bowel sounds are normal.      Palpations: Abdomen is soft. There is no mass.   Neurological:      Mental Status: She is alert and oriented to person, place, and time.   Psychiatric:         Mood and Affect: Mood normal.         Assessment & Plan:   1. Medicare annual  wellness visit, subsequent  Survey completed and the following labs ordered.  - Lipid Panel [E]; Future  - TSH W Reflex To Free T4 [E]; Future  - Urinalysis, Routine [E]; Future  - Comp Metabolic Panel (14) [E]; Future  - Lipid Panel [E]  - TSH W Reflex To Free T4 [E]  - Urinalysis, Routine [E]  - Comp Metabolic Panel (14) [E]    2. Essential hypertension  Blood pressure measures to goal.  Patient is encouraged to continue to monitor intake.  Minimize salt intake.  Safe exercise 150 minutes/week    3. Vitamin B12 deficiency  Vitamin B12 update.  - Vitamin B12 [E]; Future  - Vitamin B12 [E]    4. Dry skin dermatitis  Patient has been advised to use aloe from a plant to applied to inflamed and dry skin and on a daily basis    5. Varicella zoster virus (VZV) vaccination declined  Shingles vaccine declined by the patient.  - Zoster Vac Recomb Adjuvanted 50 MCG/0.5ML Intramuscular Recon Susp; Inject 50 mcg into the muscle once for 1 dose. Please administer vaccine at pharmacy  Dispense: 1 each; Refill: 1    6. Pneumococcal vaccination declined by patient  Pneumococcal vaccine declined by the patient.  - Prevnar 20 (PCV20) [77497]      No orders of the defined types were placed in this encounter.      Meds This Visit:  Requested Prescriptions     Pending Prescriptions Disp Refills    Zoster Vac Recomb Adjuvanted 50 MCG/0.5ML Intramuscular Recon Susp 1 each 1     Sig: Inject 50 mcg into the muscle once for 1 dose. Please administer vaccine at pharmacy       Imaging & Referrals:  PCV20 VACCINE FOR INTRAMUSCULAR USE     Patient Instructions   All adult screening ordered and done appropriate for patient's age and gender and risk factors and complaints.  Medication reviewed and renewed where needed and appropriate.  Comply with medications.  Monitor blood pressures and record at home. Limit salt intake.  Encouraged physical fitness and daily physical activity daily.  Recommend weight loss via daily exercising and consistent  healthy dietary changes.    Return in about 1 year (around 6/24/2026), or if symptoms worsen or fail to improve.         [1]   Current Outpatient Medications   Medication Sig Dispense Refill    lidocaine-menthol 4-1 % External Patch Place 1 patch onto the skin Q24H PRN (pain). 30 patch 0    amLODIPine 5 MG Oral Tab Take 1 tablet (5 mg total) by mouth daily. 90 tablet 3    vitamin B-12 50 MCG Oral Tab Take 1 tablet (50 mcg total) by mouth daily.      Cholecalciferol (VITAMIN D) 50 MCG (2000 UT) Oral Cap Take by mouth.      vitamin E 100 UNITS Oral Cap Take 1 capsule (100 Units total) by mouth daily.      Vitamin C 500 MG Oral Tab Take 1 tablet (500 mg total) by mouth daily.      omega-3 Oral Liquid Take by mouth daily.      Zinc 30 MG Oral Cap Take by mouth.      Magnesium 200 MG Oral Tab Take by mouth.      Coenzyme Q10 (CO Q-10 OR) Take 1 tablet by mouth daily.      Lactobacillus (PROBIATA OR) Take by mouth.     [2]   Allergies  Allergen Reactions    Lisinopril ANAPHYLAXIS    Penicillins ANAPHYLAXIS   [3]   Past Medical History:   Back problem    Chronic back pain    Essential hypertension    High blood pressure    Trigger thumb, right thumb    Tubular adenoma of colon    repeat CLN in 2025    Visual impairment   [4]   Past Surgical History:  Procedure Laterality Date    Colonoscopy N/A 11/11/2020    Procedure: COLONOSCOPY;  Surgeon: LUZ ELENA Reyes MD;  Location: [facility] ENDOSCOPY    Colonoscopy N/A 08/15/2024    Procedure: COLONOSCOPY;  Surgeon: Les Red MD;  Location: [facility] MAIN OR    Hysterectomy      Incise finger tendon sheath Right 01/26/2024    trigger release right thumb    Skin surgery  08/13/2024    exc lipoma chest and back   [5] No family history on file.  [6]   Social History  Socioeconomic History    Marital status:    Tobacco Use    Smoking status: Never    Smokeless tobacco: Never   Vaping Use    Vaping status: Never Used   Substance and Sexual Activity    Alcohol use: Never     Drug use: Never   Other Topics Concern    Caffeine Concern No    Exercise Yes   Social History Narrative    The patient does not use an assistive device..      The patient does live in a home with stairs.     Social Drivers of Health     Transportation Needs: No Transportation Needs (2/3/2025)    Transportation Needs     Lack of Transportation: No    Received from [facility]    Housing Stability

## 2025-06-27 RX ORDER — AMLODIPINE BESYLATE 5 MG/1
5 TABLET ORAL DAILY
Qty: 90 TABLET | Refills: 3 | OUTPATIENT
Start: 2025-06-27

## 2025-07-01 ENCOUNTER — TELEPHONE (OUTPATIENT)
Dept: FAMILY MEDICINE CLINIC | Facility: CLINIC | Age: 70
End: 2025-07-01

## 2025-07-01 NOTE — TELEPHONE ENCOUNTER
The patient called, saying she requested a refill of her amlodipine from [facility] and they informed her she needed to contact her doctor office. She stated she was told by our office there is enough refills for one year. She called [facility] back and they stated again that she does not have enough refills. Informed her I would call [facility] to see.     Called the pharmacy, they said all of her refills were dispensed already so she does not have any left. They stated they send out refills early, her last refill was sent in April which was for 90 tablets but she was dispensed all 360 tablets between September and April.     Called the patient back and asked her to check to see if she placed them somewhere once they got shipped to her house. She did find a bottle of 90 day supply in her cabinet so she does not need a refill at this time.

## 2025-07-09 ENCOUNTER — OFFICE VISIT (OUTPATIENT)
Dept: FAMILY MEDICINE CLINIC | Facility: CLINIC | Age: 70
End: 2025-07-09
Payer: MEDICARE

## 2025-07-09 VITALS
RESPIRATION RATE: 20 BRPM | DIASTOLIC BLOOD PRESSURE: 66 MMHG | TEMPERATURE: 97 F | HEIGHT: 63.5 IN | BODY MASS INDEX: 27.82 KG/M2 | WEIGHT: 159 LBS | OXYGEN SATURATION: 97 % | SYSTOLIC BLOOD PRESSURE: 120 MMHG | HEART RATE: 72 BPM

## 2025-07-09 DIAGNOSIS — M54.59 OTHER LOW BACK PAIN: Primary | ICD-10-CM

## 2025-07-09 PROCEDURE — 3074F SYST BP LT 130 MM HG: CPT | Performed by: FAMILY MEDICINE

## 2025-07-09 PROCEDURE — 97811 ACUP 1/> W/O ESTIM EA ADD 15: CPT | Performed by: FAMILY MEDICINE

## 2025-07-09 PROCEDURE — 1160F RVW MEDS BY RX/DR IN RCRD: CPT | Performed by: FAMILY MEDICINE

## 2025-07-09 PROCEDURE — 97810 ACUP 1/> WO ESTIM 1ST 15 MIN: CPT | Performed by: FAMILY MEDICINE

## 2025-07-09 PROCEDURE — 99213 OFFICE O/P EST LOW 20 MIN: CPT | Performed by: FAMILY MEDICINE

## 2025-07-09 PROCEDURE — 3008F BODY MASS INDEX DOCD: CPT | Performed by: FAMILY MEDICINE

## 2025-07-09 PROCEDURE — 3078F DIAST BP <80 MM HG: CPT | Performed by: FAMILY MEDICINE

## 2025-07-09 PROCEDURE — 1125F AMNT PAIN NOTED PAIN PRSNT: CPT | Performed by: FAMILY MEDICINE

## 2025-07-09 PROCEDURE — 1159F MED LIST DOCD IN RCRD: CPT | Performed by: FAMILY MEDICINE

## 2025-07-09 NOTE — PROGRESS NOTES
Subjective:   Patient ID: Lela Cannon is a 70 year old female.    HPI  Has pain in the mid and lower back   History/Other:   Review of Systems   Constitutional: Negative.  Negative for fatigue.   Respiratory:  Negative for cough, chest tightness and shortness of breath.    Cardiovascular: Negative.    Musculoskeletal:  Positive for back pain. Negative for gait problem, joint swelling and myalgias.   Neurological:  Negative for weakness and numbness.         Current Medications[1]  Allergies:Allergies[2]    Objective:   Physical Exam  Constitutional:       Appearance: She is well-developed.   Cardiovascular:      Rate and Rhythm: Normal rate and regular rhythm.      Heart sounds: Normal heart sounds.   Pulmonary:      Effort: Pulmonary effort is normal. No respiratory distress.      Breath sounds: Normal breath sounds. No wheezing or rales.   Abdominal:      General: There is no distension.      Palpations: Abdomen is soft.      Tenderness: There is no abdominal tenderness.   Musculoskeletal:         General: Tenderness present. No deformity.      Lumbar back: Spasms and tenderness present.   Neurological:      Mental Status: She is alert and oriented to person, place, and time.      Motor: No abnormal muscle tone.      Coordination: Coordination normal.      Deep Tendon Reflexes: Reflexes are normal and symmetric. Reflexes normal.         Assessment & Plan:   1. Other low back pain    2. Other back pain     Orders Placed This Encounter   Procedures    ACUPUNCT W/O STIMUL 15 MIN    ACUPUNCT W/O STIMUL ADDL 15M       Meds This Visit:  Requested Prescriptions      No prescriptions requested or ordered in this encounter       Imaging & Referrals:  None         [1]   Current Outpatient Medications   Medication Sig Dispense Refill    amLODIPine 5 MG Oral Tab Take 1 tablet (5 mg total) by mouth daily. 90 tablet 3    vitamin B-12 50 MCG Oral Tab Take 1 tablet (50 mcg total) by mouth daily.      Cholecalciferol  (VITAMIN D) 50 MCG (2000 UT) Oral Cap Take by mouth.      vitamin E 100 UNITS Oral Cap Take 1 capsule (100 Units total) by mouth daily.      Vitamin C 500 MG Oral Tab Take 1 tablet (500 mg total) by mouth daily.      omega-3 Oral Liquid Take by mouth daily.      Zinc 30 MG Oral Cap Take by mouth.      Magnesium 200 MG Oral Tab Take by mouth.      Coenzyme Q10 (CO Q-10 OR) Take 1 tablet by mouth daily.      Lactobacillus (PROBIATA OR) Take by mouth.     [2]   Allergies  Allergen Reactions    Lisinopril ANAPHYLAXIS    Penicillins ANAPHYLAXIS

## 2025-07-09 NOTE — PROCEDURES
Patient tolerated procedure well in excess of 30 minutes was spent with patient   There was no complications all needles were removed.   Patient was advised to rest today and f/u in 1-2 weeks  Aman   Ear points   Anabela back points

## 2025-07-14 ENCOUNTER — LAB ENCOUNTER (OUTPATIENT)
Dept: LAB | Facility: HOSPITAL | Age: 70
End: 2025-07-14
Attending: FAMILY MEDICINE
Payer: MEDICARE

## 2025-07-14 ENCOUNTER — OFFICE VISIT (OUTPATIENT)
Dept: SURGERY | Facility: CLINIC | Age: 70
End: 2025-07-14
Payer: MEDICARE

## 2025-07-14 VITALS — SYSTOLIC BLOOD PRESSURE: 139 MMHG | HEART RATE: 65 BPM | DIASTOLIC BLOOD PRESSURE: 70 MMHG

## 2025-07-14 DIAGNOSIS — L91.0 KELOID: Primary | ICD-10-CM

## 2025-07-14 LAB
BILIRUB UR QL: NEGATIVE
CLARITY UR: CLEAR
COLOR UR: COLORLESS
GLUCOSE UR-MCNC: NORMAL MG/DL
HGB UR QL STRIP.AUTO: NEGATIVE
KETONES UR-MCNC: NEGATIVE MG/DL
LEUKOCYTE ESTERASE UR QL STRIP.AUTO: NEGATIVE
NITRITE UR QL STRIP.AUTO: NEGATIVE
PH UR: 6.5 [PH] (ref 5–8)
PROT UR-MCNC: NEGATIVE MG/DL
SP GR UR STRIP: 1 (ref 1–1.03)
UROBILINOGEN UR STRIP-ACNC: NORMAL

## 2025-07-14 PROCEDURE — 99213 OFFICE O/P EST LOW 20 MIN: CPT | Performed by: SURGERY

## 2025-07-14 PROCEDURE — 1159F MED LIST DOCD IN RCRD: CPT | Performed by: SURGERY

## 2025-07-14 PROCEDURE — 1160F RVW MEDS BY RX/DR IN RCRD: CPT | Performed by: SURGERY

## 2025-07-14 PROCEDURE — 81003 URINALYSIS AUTO W/O SCOPE: CPT | Performed by: FAMILY MEDICINE

## 2025-07-14 PROCEDURE — 1170F FXNL STATUS ASSESSED: CPT | Performed by: SURGERY

## 2025-07-14 PROCEDURE — 3075F SYST BP GE 130 - 139MM HG: CPT | Performed by: SURGERY

## 2025-07-14 PROCEDURE — 3078F DIAST BP <80 MM HG: CPT | Performed by: SURGERY

## 2025-07-14 NOTE — H&P
History and Physical      HPI       HPI  Lela Cannon is a 70 year old female who presents with keloid of the intermammary cleft.  Patient had prior epidermoid cyst excised.  No symptoms.    Past Medical History[1]  Past Surgical History[2]  Current Medications[3]  ALLERGIES  Allergies[4]    Set as collapsible by default.[5]  Family History[6]    Review of Systems   A comprehensive 10 point review of systems was completed.  Pertinent positives and negatives noted in the the HPI.    PHYSICAL EXAM   /70   Pulse 65  No LMP recorded. Patient has had a hysterectomy.   Constitutional: appears well hydrated alert and responsive no acute distress noted  Head/Face: normocephalic  Nose/Mouth/Throat: nose and throat are clear palate is intact mucous membranes are moist no oral lesions are noted  Neck/Thyroid: neck is supple without adenopathy  Respiratory: normal to inspection lungs are clear to auscultation bilaterally normal respiratory effort    1.5 cm keloid intermammary cleft nontender    Cardiovascular: regular rate and rhythm no murmurs, gallups, or rubs  Abdomen: soft non-tender non-distended no organomegaly noted no masses  Extremities: no edema, cyanosis, or clubbing  Neurological: exam appropriate for age reflexes and motor skills appropriate for age      ASSESSMENT/PLAN   Assessment   Encounter Diagnosis   Name Primary?    Keloid Yes       70 year old female with keloid  We have discussed the surgical risks, benefits, alternatives, and expected recovery. We will plan observation as this is asymptomatic and she does not desire reexcision.. All of the patient's questions have been answered to her satisfaction.       7/14/2025  Rob Vee MD               [1]   Past Medical History:   Back problem    Chronic back pain    Essential hypertension    High blood pressure    Trigger thumb, right thumb    Tubular adenoma of colon    repeat CLN in 2025    Visual impairment   [2]   Past Surgical  History:  Procedure Laterality Date    Colonoscopy N/A 11/11/2020    Procedure: COLONOSCOPY;  Surgeon: LUZ ELENA Reyes MD;  Location: [facility] ENDOSCOPY    Colonoscopy N/A 08/15/2024    Procedure: COLONOSCOPY;  Surgeon: Les Red MD;  Location: [facility] MAIN OR    Hysterectomy      Incise finger tendon sheath Right 01/26/2024    trigger release right thumb    Skin surgery  08/13/2024    exc lipoma chest and back   [3]   Current Outpatient Medications   Medication Sig Dispense Refill    amLODIPine 5 MG Oral Tab Take 1 tablet (5 mg total) by mouth daily. 90 tablet 3    vitamin B-12 50 MCG Oral Tab Take 1 tablet (50 mcg total) by mouth daily.      Cholecalciferol (VITAMIN D) 50 MCG (2000 UT) Oral Cap Take by mouth.      vitamin E 100 UNITS Oral Cap Take 1 capsule (100 Units total) by mouth daily.      Vitamin C 500 MG Oral Tab Take 1 tablet (500 mg total) by mouth daily.      omega-3 Oral Liquid Take by mouth daily.      Zinc 30 MG Oral Cap Take by mouth.      Magnesium 200 MG Oral Tab Take by mouth.      Coenzyme Q10 (CO Q-10 OR) Take 1 tablet by mouth daily.      Lactobacillus (PROBIATA OR) Take by mouth.     [4]   Allergies  Allergen Reactions    Lisinopril ANAPHYLAXIS    Penicillins ANAPHYLAXIS   [5]   Social History  Socioeconomic History    Marital status:    Tobacco Use    Smoking status: Never    Smokeless tobacco: Never   Vaping Use    Vaping status: Never Used   Substance and Sexual Activity    Alcohol use: Never    Drug use: Never   [6] History reviewed. No pertinent family history.

## 2025-07-28 ENCOUNTER — APPOINTMENT (OUTPATIENT)
Dept: GENERAL RADIOLOGY | Facility: HOSPITAL | Age: 70
End: 2025-07-28

## 2025-07-28 ENCOUNTER — HOSPITAL ENCOUNTER (EMERGENCY)
Facility: HOSPITAL | Age: 70
Discharge: HOME | End: 2025-07-29
Attending: EMERGENCY MEDICINE

## 2025-07-28 DIAGNOSIS — S60.222A CONTUSION OF LEFT HAND, INITIAL ENCOUNTER: Primary | ICD-10-CM

## 2025-07-28 PROCEDURE — 73130 X-RAY EXAM OF HAND: CPT

## 2025-07-28 PROCEDURE — 73110 X-RAY EXAM OF WRIST: CPT

## 2025-07-28 PROCEDURE — 99283 EMERGENCY DEPT VISIT LOW MDM: CPT

## 2025-07-28 PROCEDURE — 99284 EMERGENCY DEPT VISIT MOD MDM: CPT

## 2025-07-29 VITALS
HEART RATE: 72 BPM | TEMPERATURE: 98 F | OXYGEN SATURATION: 99 % | SYSTOLIC BLOOD PRESSURE: 132 MMHG | RESPIRATION RATE: 16 BRPM | DIASTOLIC BLOOD PRESSURE: 81 MMHG

## 2025-07-30 ENCOUNTER — OFFICE VISIT (OUTPATIENT)
Dept: FAMILY MEDICINE CLINIC | Facility: CLINIC | Age: 70
End: 2025-07-30

## 2025-07-30 VITALS
BODY MASS INDEX: 27.65 KG/M2 | HEART RATE: 69 BPM | OXYGEN SATURATION: 96 % | DIASTOLIC BLOOD PRESSURE: 66 MMHG | HEIGHT: 63.5 IN | WEIGHT: 158 LBS | TEMPERATURE: 97 F | RESPIRATION RATE: 20 BRPM | SYSTOLIC BLOOD PRESSURE: 157 MMHG

## 2025-07-30 DIAGNOSIS — M54.59 OTHER LOW BACK PAIN: Primary | ICD-10-CM

## 2025-07-30 PROCEDURE — 3077F SYST BP >= 140 MM HG: CPT | Performed by: FAMILY MEDICINE

## 2025-07-30 PROCEDURE — 97814 ACUP 1/> W/ESTIM EA ADDL 15: CPT | Performed by: FAMILY MEDICINE

## 2025-07-30 PROCEDURE — 3078F DIAST BP <80 MM HG: CPT | Performed by: FAMILY MEDICINE

## 2025-07-30 PROCEDURE — 1159F MED LIST DOCD IN RCRD: CPT | Performed by: FAMILY MEDICINE

## 2025-07-30 PROCEDURE — 3008F BODY MASS INDEX DOCD: CPT | Performed by: FAMILY MEDICINE

## 2025-07-30 PROCEDURE — 1160F RVW MEDS BY RX/DR IN RCRD: CPT | Performed by: FAMILY MEDICINE

## 2025-07-30 PROCEDURE — 97813 ACUP 1/> W/ESTIM 1ST 15 MIN: CPT | Performed by: FAMILY MEDICINE

## 2025-08-06 ENCOUNTER — OFFICE VISIT (OUTPATIENT)
Dept: FAMILY MEDICINE CLINIC | Facility: CLINIC | Age: 70
End: 2025-08-06

## 2025-08-06 VITALS
HEART RATE: 87 BPM | OXYGEN SATURATION: 97 % | RESPIRATION RATE: 20 BRPM | TEMPERATURE: 95 F | SYSTOLIC BLOOD PRESSURE: 144 MMHG | BODY MASS INDEX: 28 KG/M2 | WEIGHT: 158 LBS | DIASTOLIC BLOOD PRESSURE: 87 MMHG | HEIGHT: 63 IN

## 2025-08-06 DIAGNOSIS — M54.59 OTHER LOW BACK PAIN: Primary | ICD-10-CM

## 2025-08-06 DIAGNOSIS — M54.6 THORACIC SPINE PAIN: ICD-10-CM

## 2025-08-06 PROCEDURE — 3079F DIAST BP 80-89 MM HG: CPT | Performed by: FAMILY MEDICINE

## 2025-08-06 PROCEDURE — 1159F MED LIST DOCD IN RCRD: CPT | Performed by: FAMILY MEDICINE

## 2025-08-06 PROCEDURE — 1160F RVW MEDS BY RX/DR IN RCRD: CPT | Performed by: FAMILY MEDICINE

## 2025-08-06 PROCEDURE — 97810 ACUP 1/> WO ESTIM 1ST 15 MIN: CPT | Performed by: FAMILY MEDICINE

## 2025-08-06 PROCEDURE — 3077F SYST BP >= 140 MM HG: CPT | Performed by: FAMILY MEDICINE

## 2025-08-06 PROCEDURE — 97811 ACUP 1/> W/O ESTIM EA ADD 15: CPT | Performed by: FAMILY MEDICINE

## 2025-08-06 PROCEDURE — 99212 OFFICE O/P EST SF 10 MIN: CPT | Performed by: FAMILY MEDICINE

## 2025-08-06 PROCEDURE — 3008F BODY MASS INDEX DOCD: CPT | Performed by: FAMILY MEDICINE

## 2025-08-13 ENCOUNTER — OFFICE VISIT (OUTPATIENT)
Dept: FAMILY MEDICINE CLINIC | Facility: CLINIC | Age: 70
End: 2025-08-13

## 2025-08-13 VITALS
SYSTOLIC BLOOD PRESSURE: 138 MMHG | BODY MASS INDEX: 28.46 KG/M2 | DIASTOLIC BLOOD PRESSURE: 67 MMHG | HEART RATE: 88 BPM | OXYGEN SATURATION: 98 % | HEIGHT: 63 IN | WEIGHT: 160.63 LBS

## 2025-08-13 DIAGNOSIS — M79.605 LEG PAIN, BILATERAL: ICD-10-CM

## 2025-08-13 DIAGNOSIS — M79.604 LEG PAIN, BILATERAL: ICD-10-CM

## 2025-08-13 DIAGNOSIS — M54.59 OTHER LOW BACK PAIN: Primary | ICD-10-CM

## 2025-08-13 PROCEDURE — 1125F AMNT PAIN NOTED PAIN PRSNT: CPT | Performed by: FAMILY MEDICINE

## 2025-08-13 PROCEDURE — 99213 OFFICE O/P EST LOW 20 MIN: CPT | Performed by: FAMILY MEDICINE

## 2025-08-13 PROCEDURE — 3078F DIAST BP <80 MM HG: CPT | Performed by: FAMILY MEDICINE

## 2025-08-13 PROCEDURE — 1160F RVW MEDS BY RX/DR IN RCRD: CPT | Performed by: FAMILY MEDICINE

## 2025-08-13 PROCEDURE — 97810 ACUP 1/> WO ESTIM 1ST 15 MIN: CPT | Performed by: FAMILY MEDICINE

## 2025-08-13 PROCEDURE — 1159F MED LIST DOCD IN RCRD: CPT | Performed by: FAMILY MEDICINE

## 2025-08-13 PROCEDURE — 3075F SYST BP GE 130 - 139MM HG: CPT | Performed by: FAMILY MEDICINE

## 2025-08-13 PROCEDURE — 97811 ACUP 1/> W/O ESTIM EA ADD 15: CPT | Performed by: FAMILY MEDICINE

## 2025-08-13 PROCEDURE — 3008F BODY MASS INDEX DOCD: CPT | Performed by: FAMILY MEDICINE

## (undated) DIAGNOSIS — H91.93 HEARING DIFFICULTY OF BOTH EARS: Primary | ICD-10-CM

## (undated) DEVICE — PLASTIC HAND: Brand: MEDLINE INDUSTRIES, INC.

## (undated) DEVICE — 35 ML SYRINGE REGULAR TIP: Brand: MONOJECT

## (undated) DEVICE — SUTURE. ETHLN SZ 4-0 L18IN NONABSORBABLE BLK .

## (undated) DEVICE — MASK SURG W/VISOR ANTIGLARE

## (undated) DEVICE — Device: Brand: CUSTOM PROCEDURE KIT

## (undated) DEVICE — TRAP 4 CPTR CHMBR N EZ INLN

## (undated) DEVICE — LINE MNTR ADLT SET O2 INTMD

## (undated) DEVICE — GAMMEX® PI HYBRID SIZE 7, STERILE POWDER-FREE SURGICAL GLOVE, POLYISOPRENE AND NEOPRENE BLEND: Brand: GAMMEX

## (undated) DEVICE — SOLUTION IRRIG 1000ML 0.9% NACL USP BTL

## (undated) DEVICE — MEDI-VAC NON-CONDUCTIVE SUCTION TUBING 6MM X 1.8M (6FT.) L: Brand: CARDINAL HEALTH

## (undated) DEVICE — GOWN SURG L DISP LEV 3 AERO BLU RAGLAN SL ST

## (undated) DEVICE — DISPOSABLE TOURNIQUET CUFF SINGLE BLADDER, DUAL PORT AND QUICK CONNECT CONNECTOR: Brand: COLOR CUFF

## (undated) DEVICE — SNARE ENDOSCOPIC 10MM ROUND

## (undated) NOTE — LETTER
12/16/2024          To Whom It May Concern:    Lela Cannon is currently under my medical care.     It is medically necessary for her to get custom orthotics to control midfoot motion and decrease pain with ambulation allowing her to function in her activities of daily living (ADL's). It will assist in offloading the ball of her foot and redistributing her weight more evenly.    If you require additional information please contact our office.      Sincerely,    Anselmo Martin DPM

## (undated) NOTE — LETTER
Shaye Dub 35   Date:   9/29/2020     Name:   Jonnathan Remy    YOB: 1955   MRN:   [id number]       WHERE IS YOUR PAIN NOW? Liam the areas on your body where you feel the described sensations.   Use the appropriate symb

## (undated) NOTE — LETTER
AUTHORIZATION FOR SURGICAL OPERATION OR OTHER PROCEDURE    1. I hereby authorize Dr. Cherry Moreno, and [facility] staff assigned to my case to perform the following operation and/or procedure at the [facility] site:    _______________________________________________________________________________________________      _______________________________________________________________________________________________    2.  My physician has explained the nature and purpose of the operation or other procedure, possible alternative methods of treatment, the risks involved, and the possibility of complication to me.  I acknowledge that no guarantee has been made as to the result that may be obtained.  3.  I recognize that, during the course of this operation, or other procedure, unforseen conditions may necessitate additional or different procedure than those listed above.  I, therefore, further authorize and request that the above named physician, his/her physician assistants or designees perform such procedures as are, in his/her professional opinion, necessary and desirable.  4.  Any tissue or organs removed in the operation or other procedure may be disposed of by and at the discretion of the [facility] and [facility].  5.  I understand that in the event of a medical emergency, I will be transported by local paramedics to [facility] or other hospital emergency department.  6.  I certify that I have read and fully understand the above consent to operation and/or other procedure.    7.  I acknowledge that my physician has explained sedation/analgesia administration to me including the risks and benefits.  I consent to the administration of sedation/analgesia as may be necessary or desirable in the judgement of my physician.    Witness signature: ___________________________________________________ Date:  ______/______/_____                    Time:  ________  A.M.  P.M.       Patient Name:  ______________________________________________________  (please print)      Patient signature:  ___________________________________________________             Relationship to Patient:           []  Parent    Responsible person                          []  Spouse  In case of minor or                    [] Other  _____________   Incompetent name:  __________________________________________________                               (please print)      _____________      Responsible person  In case of minor or  Incompetent signature:  _______________________________________________    Statement of Physician  My signature below affirms that prior to the time of the procedure, I have explained to the patient and/or his/her guardian, the risks and benefits involved in the proposed treatment and any reasonable alternative to the proposed treatment.  I have also explained the risks and benefits involved in the refusal of the proposed treatment and have answered the patient's questions.                        Date:  ______/______/_______  Provider                      Signature:  __________________________________________________________       Time:  ___________ A.M    P.M.

## (undated) NOTE — LETTER
September 30, 2020         [address]  [id number] [id number]      Patient: Jasmin Hilario   YOB: 1955   Date of Visit: 9/30/2020       Dear Dr. Carlos Luna,    I saw your patient, Jasmin Hilario, on

## (undated) NOTE — LETTER
[facility], [address] [place] [address], [facility]  [id number]  [facility] [address] [id number] [phone]                11/17/20      Lisa 47        Dear Romina Alexandre,    I reviewed the pathology report from t

## (undated) NOTE — Clinical Note
Initial assessment completed with patient. Offered LUBA/ER f/u appt with you/partners--pt declines. Pt confirms ophthalmology appt tomorrow, as previously scheduled and will keep 6/24/25 appt with PCP. Patient has met goals, no further outreach needed. Sent TE to office staff as FYI/LUBA protocol.  Thank you!  Future Appointments 6/24/2025  11:00 AM   Evelio Patterson, DO    [facility]             [facility]

## (undated) NOTE — LETTER
Stella Clancy (Legal Name)     74year old, 6/10/1955    Gender identity: Female    Legal sex: Female    Sex assigned at birth: Female    Marital status:     Race: Black or     Ethnicity: NON  OR  OR  ET Your physician or the clinic staff will provide you with the phone number you should call to schedule your appointment.      If you are confident that your benefit plan will not require a referral or authorization, such as [place], please feel fr From - To Date  2021-10-04 - 2021-11-03  Quantity  1 Visits   Diagnosis Code 1  E559 - Vitamin D deficiency unspecified  Procedure Code 1 (CPT/HCPCS)  64715  Quantity  1 Units  Procedure From - To Date  2021-10-04 - 2021-11-03  Rendering Provider/Facility